# Patient Record
Sex: MALE | Race: WHITE | Employment: OTHER
[De-identification: names, ages, dates, MRNs, and addresses within clinical notes are randomized per-mention and may not be internally consistent; named-entity substitution may affect disease eponyms.]

---

## 2022-03-19 PROBLEM — K59.01 SLOW TRANSIT CONSTIPATION: Status: ACTIVE | Noted: 2019-12-09

## 2022-03-19 PROBLEM — I10 ESSENTIAL HYPERTENSION: Status: ACTIVE | Noted: 2017-05-19

## 2022-06-10 ENCOUNTER — NURSE ONLY (OUTPATIENT)
Dept: FAMILY MEDICINE CLINIC | Facility: CLINIC | Age: 67
End: 2022-06-10

## 2022-06-10 ENCOUNTER — OFFICE VISIT (OUTPATIENT)
Dept: FAMILY MEDICINE CLINIC | Facility: CLINIC | Age: 67
End: 2022-06-10
Payer: COMMERCIAL

## 2022-06-10 VITALS
SYSTOLIC BLOOD PRESSURE: 134 MMHG | RESPIRATION RATE: 17 BRPM | DIASTOLIC BLOOD PRESSURE: 82 MMHG | BODY MASS INDEX: 24.16 KG/M2 | OXYGEN SATURATION: 97 % | HEART RATE: 74 BPM | TEMPERATURE: 97.1 F | HEIGHT: 77 IN | WEIGHT: 204.6 LBS

## 2022-06-10 DIAGNOSIS — I10 ESSENTIAL HYPERTENSION: ICD-10-CM

## 2022-06-10 DIAGNOSIS — M51.26 LUMBAR DISC HERNIATION: ICD-10-CM

## 2022-06-10 DIAGNOSIS — Z12.5 SCREENING PSA (PROSTATE SPECIFIC ANTIGEN): ICD-10-CM

## 2022-06-10 DIAGNOSIS — Z12.11 COLON CANCER SCREENING: ICD-10-CM

## 2022-06-10 DIAGNOSIS — Z13.220 SCREENING FOR CHOLESTEROL LEVEL: ICD-10-CM

## 2022-06-10 DIAGNOSIS — R29.898 WEAKNESS OF BOTH LOWER EXTREMITIES: Primary | ICD-10-CM

## 2022-06-10 DIAGNOSIS — K59.01 SLOW TRANSIT CONSTIPATION: ICD-10-CM

## 2022-06-10 DIAGNOSIS — E78.2 MIXED HYPERLIPIDEMIA: ICD-10-CM

## 2022-06-10 DIAGNOSIS — K58.2 IRRITABLE BOWEL SYNDROME WITH BOTH CONSTIPATION AND DIARRHEA: ICD-10-CM

## 2022-06-10 DIAGNOSIS — L40.9 PSORIASIS: ICD-10-CM

## 2022-06-10 DIAGNOSIS — J30.2 SEASONAL ALLERGIES: ICD-10-CM

## 2022-06-10 LAB
BASOPHILS # BLD: 0 K/UL (ref 0–0.2)
BASOPHILS NFR BLD: 1 % (ref 0–2)
CHOLEST SERPL-MCNC: 221 MG/DL
DIFFERENTIAL METHOD BLD: NORMAL
EOSINOPHIL # BLD: 0.2 K/UL (ref 0–0.8)
EOSINOPHIL NFR BLD: 3 % (ref 0.5–7.8)
ERYTHROCYTE [DISTWIDTH] IN BLOOD BY AUTOMATED COUNT: 12 % (ref 11.9–14.6)
HCT VFR BLD AUTO: 48.2 % (ref 41.1–50.3)
HDLC SERPL-MCNC: 76 MG/DL (ref 40–60)
HDLC SERPL: 2.9 {RATIO}
HGB BLD-MCNC: 15.8 G/DL (ref 13.6–17.2)
IMM GRANULOCYTES # BLD AUTO: 0 K/UL (ref 0–0.5)
IMM GRANULOCYTES NFR BLD AUTO: 0 % (ref 0–5)
LDLC SERPL CALC-MCNC: 122.2 MG/DL
LYMPHOCYTES # BLD: 1.8 K/UL (ref 0.5–4.6)
LYMPHOCYTES NFR BLD: 26 % (ref 13–44)
MCH RBC QN AUTO: 30.6 PG (ref 26.1–32.9)
MCHC RBC AUTO-ENTMCNC: 32.8 G/DL (ref 31.4–35)
MCV RBC AUTO: 93.2 FL (ref 79.6–97.8)
MONOCYTES # BLD: 0.7 K/UL (ref 0.1–1.3)
MONOCYTES NFR BLD: 9 % (ref 4–12)
NEUTS SEG # BLD: 4.4 K/UL (ref 1.7–8.2)
NEUTS SEG NFR BLD: 61 % (ref 43–78)
NRBC # BLD: 0 K/UL (ref 0–0.2)
PLATELET # BLD AUTO: 186 K/UL (ref 150–450)
PMV BLD AUTO: 10.4 FL (ref 9.4–12.3)
PSA SERPL-MCNC: 0.4 NG/ML
RBC # BLD AUTO: 5.17 M/UL (ref 4.23–5.6)
TRIGL SERPL-MCNC: 114 MG/DL (ref 35–150)
VLDLC SERPL CALC-MCNC: 22.8 MG/DL (ref 6–23)
WBC # BLD AUTO: 7.2 K/UL (ref 4.3–11.1)

## 2022-06-10 PROCEDURE — 99214 OFFICE O/P EST MOD 30 MIN: CPT | Performed by: FAMILY MEDICINE

## 2022-06-10 PROCEDURE — 1123F ACP DISCUSS/DSCN MKR DOCD: CPT | Performed by: FAMILY MEDICINE

## 2022-06-10 RX ORDER — DICYCLOMINE HYDROCHLORIDE 10 MG/1
20 CAPSULE ORAL 3 TIMES DAILY PRN
Qty: 60 CAPSULE | Refills: 5 | Status: SHIPPED | OUTPATIENT
Start: 2022-06-10 | End: 2022-10-04 | Stop reason: SDUPTHER

## 2022-06-10 RX ORDER — AMLODIPINE BESYLATE 10 MG/1
10 TABLET ORAL DAILY
Qty: 90 TABLET | Refills: 3 | Status: SHIPPED | OUTPATIENT
Start: 2022-06-10

## 2022-06-10 RX ORDER — FLUTICASONE PROPIONATE 50 MCG
1 SPRAY, SUSPENSION (ML) NASAL DAILY
Qty: 16 G | Refills: 5 | Status: SHIPPED | OUTPATIENT
Start: 2022-06-10

## 2022-06-10 ASSESSMENT — ENCOUNTER SYMPTOMS
BACK PAIN: 1
ABDOMINAL PAIN: 1
COUGH: 0
CONSTIPATION: 1
NAUSEA: 1
SHORTNESS OF BREATH: 0
DIARRHEA: 1

## 2022-06-10 ASSESSMENT — ANXIETY QUESTIONNAIRES
6. BECOMING EASILY ANNOYED OR IRRITABLE: 0
1. FEELING NERVOUS, ANXIOUS, OR ON EDGE: 0
IF YOU CHECKED OFF ANY PROBLEMS ON THIS QUESTIONNAIRE, HOW DIFFICULT HAVE THESE PROBLEMS MADE IT FOR YOU TO DO YOUR WORK, TAKE CARE OF THINGS AT HOME, OR GET ALONG WITH OTHER PEOPLE: NOT DIFFICULT AT ALL
GAD7 TOTAL SCORE: 0
4. TROUBLE RELAXING: 0
7. FEELING AFRAID AS IF SOMETHING AWFUL MIGHT HAPPEN: 0
3. WORRYING TOO MUCH ABOUT DIFFERENT THINGS: 0
2. NOT BEING ABLE TO STOP OR CONTROL WORRYING: 0
5. BEING SO RESTLESS THAT IT IS HARD TO SIT STILL: 0

## 2022-06-10 ASSESSMENT — PATIENT HEALTH QUESTIONNAIRE - PHQ9
SUM OF ALL RESPONSES TO PHQ QUESTIONS 1-9: 0
2. FEELING DOWN, DEPRESSED OR HOPELESS: 0
DEPRESSION UNABLE TO ASSESS: PT REFUSES
SUM OF ALL RESPONSES TO PHQ9 QUESTIONS 1 & 2: 0
SUM OF ALL RESPONSES TO PHQ QUESTIONS 1-9: 0
SUM OF ALL RESPONSES TO PHQ QUESTIONS 1-9: 0
1. LITTLE INTEREST OR PLEASURE IN DOING THINGS: 0
SUM OF ALL RESPONSES TO PHQ QUESTIONS 1-9: 0

## 2022-06-10 NOTE — PROGRESS NOTES
6/10/2022    Francia Dos Santos (:  1955) is a 73 y.o. male, here for a preventive medicine evaluation. Patient Active Problem List   Diagnosis    Essential hypertension    Slow transit constipation     73-year-old male with underlying hypertension, irritable bowel symptoms is here for yearly checkup. Has not been participating in healthcare for the last year due to loss of insurance. He has several concerns today. 1) lower extremity weakness. He reports bilateral weakness in his legs. Often feels like he cannot get them to move. Interestingly says it is worse when he has significant constipation due to IBS. 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 Not sure what the tumor was but he reports it was benign. No history of cervical or lumbar surgery. He was in [place] again in . Evaluated for lower extremity weakness. MRI without contrast as below. \"Mri Lumbar Spine Without Contrast  Result Date: 2021  Multilevel degenerative disc disease which is most significant at L4-L5 with a posterior superior central disc herniation seen effacing the left lateral recess and possibly encroaching on the descending left L5 nerve root. No acute osseous abnormality\"    He apparently did not follow-up after this. At this time he reports unsteady gait requiring him to use a cane. 2)  Hypertension:  Controlled with amlodipine 10 mg. Denies chest pain, shortness of breath, headaches. No history of CAD. 3) IBS:  Alternates between constipation and diarrhea. Often has to take Dulcolax daily. 4) skin  Several areas of patchy, dry skin. Has not seen dermatology recently. Would like referral.          Review of Systems   Constitutional: Negative for activity change, appetite change, chills, diaphoresis, fatigue and fever. Respiratory: Negative for cough and shortness of breath. Cardiovascular: Negative. Negative for chest pain. Gastrointestinal: Positive for abdominal pain, constipation, diarrhea and nausea. Musculoskeletal: Positive for back pain and gait problem. Skin: Positive for rash. Neurological: Positive for weakness. Prior to Visit Medications    Medication Sig Taking? Authorizing Provider   amLODIPine (NORVASC) 10 MG tablet Take 10 mg by mouth daily Yes Ar Automatic Reconciliation   bisacodyl (DULCOLAX) 5 MG EC tablet Take 5 mg by mouth daily Yes Ar Automatic Reconciliation   dicyclomine (BENTYL) 10 MG capsule Take 20 mg by mouth 3 times daily as needed Yes Ar Automatic Reconciliation   fluticasone (FLONASE) 50 MCG/ACT nasal spray USE 2 SPRAYS EACH NOSTRIL A DAY AS NEEDED Yes Ar Automatic Reconciliation   polyethylene glycol (GLYCOLAX) 17 GM/SCOOP powder Take 34 g by mouth 2 times daily Yes Ar Automatic Reconciliation        No Known Allergies    Past Medical History:   Diagnosis Date    Hypertension        Past Surgical History:   Procedure Laterality Date    NEUROLOGICAL SURGERY      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      ORTHOPEDIC SURGERY      left knee    SKIN GRAFT      right hand and lower arm       Social History     Socioeconomic History    Marital status:      Spouse name: Not on file    Number of children: Not on file    Years of education: Not on file    Highest education level: Not on file   Occupational History    Not on file   Tobacco Use    Smoking status: Current Every Day Smoker     Packs/day: 0.50    Smokeless tobacco: Never Used   Substance and Sexual Activity    Alcohol use:  Yes    Drug use: No    Sexual activity: Not on file   Other Topics Concern    Not on file   Social History Narrative    Not on file     Social Determinants of Health     Financial Resource Strain:     Difficulty of Paying Living Expenses: Not on file   Food Insecurity:     Worried About Running Out of Food in the Last Year: Not on file    Fariba of Food in the Last Year: Not on file   Transportation Needs:     Lack of Transportation (Medical): Not on file    Lack of Transportation (Non-Medical): Not on file   Physical Activity:     Days of Exercise per Week: Not on file    Minutes of Exercise per Session: Not on file   Stress:     Feeling of Stress : Not on file   Social Connections:     Frequency of Communication with Friends and Family: Not on file    Frequency of Social Gatherings with Friends and Family: Not on file    Attends [employer] Services: Not on file    Active Member of [address] [place] [address] [employer] or Organizations: Not on file    Attends Club or Organization Meetings: Not on file    Marital Status: Not on file   Intimate Partner Violence:     Fear of Current or Ex-Partner: Not on file    Emotionally Abused: Not on file    Physically Abused: Not on file    Sexually Abused: Not on file   Housing Stability:     Unable to Pay for Housing in the Last Year: Not on file    Number of [place] in the Last Year: Not on file    Unstable Housing in the Last Year: Not on file        Family History   Problem Relation Age of Onset    Diabetes Sister     Stroke Father     Diabetes Brother     Cancer Neg Hx     Diabetes Mother         borderline    Heart Disease Mother         chf    Heart Disease Sister         MI  over 48       ADVANCE DIRECTIVE: N, <no information>    Vitals:    06/10/22 1024   BP: 134/82  Comment: manual   Site: Left Upper Arm   Pulse: 74   Resp: 17   Temp: 97.1 °F (36.2 °C)   TempSrc: Temporal   SpO2: 97%  Comment: room air sitting   Weight: 204 lb 9.6 oz (92.8 kg)   Height: 6' 5\" (1.956 m)     Estimated body mass index is 24.26 kg/m² as calculated from the following:    Height as of this encounter: 6' 5\" (1.956 m). Weight as of this encounter: 204 lb 9.6 oz (92.8 kg). Physical Exam  Vitals and nursing note reviewed. Constitutional:       General: He is not in acute distress. Appearance: He is not ill-appearing or toxic-appearing.    Cardiovascular:      Rate and Rhythm: Normal rate and regular rhythm. Pulmonary:      Effort: Pulmonary effort is normal.      Breath sounds: Normal breath sounds. Musculoskeletal:      Right lower leg: No edema. Left lower leg: No edema. Skin:     Capillary Refill: Capillary refill takes less than 2 seconds. Findings: Erythema and rash present. Neurological:      Mental Status: He is alert and oriented to person, place, and time. Motor: Weakness present. Coordination: Coordination abnormal.      Gait: Gait abnormal.   Psychiatric:         Mood and Affect: Mood normal.         Behavior: Behavior normal.         Thought Content: Thought content normal.         Judgment: Judgment normal.         No flowsheet data found. Immunization History   Administered Date(s) Administered    COVID-19, Pfizer Purple top, DILUTE for use, 12+ yrs, 30mcg/0.3mL dose 03/27/2021, 04/23/2021       Health Maintenance   Topic Date Due    Pneumococcal 65+ years Vaccine (1 - PCV) Never done    HIV screen  Never done    Hepatitis C screen  Never done    DTaP/Tdap/Td vaccine (1 - Tdap) Never done    Colorectal Cancer Screen  Never done    Shingles vaccine (1 of 2) Never done    Prostate Specific Antigen (PSA) Screening or Monitoring  11/20/2020    COVID-19 Vaccine (3 - Booster for Pfizer series) 09/23/2021    AAA screen  Never done    Flu vaccine (Season Ended) 09/01/2022    Depression Screen  04/12/2023    Lipids  07/04/2026    Hepatitis A vaccine  Aged Out    Hepatitis B vaccine  Aged Out    Hib vaccine  Aged Out    Meningococcal (ACWY) vaccine  Aged Out       Assessment & Plan   Mikael Raphael was seen today for annual exam.    Diagnoses and all orders for this visit:    Weakness of both lower extremities  -     [facility] [employer]    Slow transit constipation  -     [facility]    Irritable bowel syndrome with both constipation and diarrhea  -     bisacodyl (DULCOLAX) 5 MG EC tablet;  Take 1 tablet by mouth daily  -     dicyclomine (BENTYL) 10 MG capsule; Take 2 capsules by mouth 3 times daily as needed (stomach cramping)  -     [facility]    Seasonal allergies  -     fluticasone (FLONASE) 50 MCG/ACT nasal spray; 1 spray by Nasal route daily USE 2 SPRAYS EACH NOSTRIL A DAY AS NEEDED    Essential hypertension  -     amLODIPine (NORVASC) 10 MG tablet; Take 1 tablet by mouth daily  -     Comprehensive Metabolic Panel; Future  -     CBC with Auto Differential; Future    Screening PSA (prostate specific antigen)  -     PSA Screening; Future    Mixed hyperlipidemia  -     Lipid Panel; Future    Screening for cholesterol level    Colon cancer screening  -     [facility]    Lumbar disc herniation  -     [facility]    Psoriasis  -     External Referral to Dermatology    Other orders  -     Handicap Placard MIS; by Does not apply route        PLAN:  1)  We will refer to ortho surgeon for low back disc herniation which may be causing the lower extremity weakness. 2)  Refer to gastroenterology for IBS symptoms and colonoscopy. 3)  Continue blood pressure medication. 4)  Referral to dermatology. 5)  Check labs today. We will call with results. 6) Follow up in 3 months for check up.          --Alana Nicholson MD, MD no

## 2022-06-10 NOTE — PATIENT INSTRUCTIONS
PLAN:  1)  We will refer to ortho surgeon for low back disc herniation which may be causing the lower extremity weakness. 2)  Refer to gastroenterology for IBS symptoms and colonoscopy. 3)  Continue blood pressure medication. 4)  Referral to dermatology. 5)  Check labs today. We will call with results. 6) Follow up in 3 months for check up.

## 2022-06-27 ENCOUNTER — OFFICE VISIT (OUTPATIENT)
Dept: ORTHOPEDIC SURGERY | Age: 67
End: 2022-06-27
Payer: COMMERCIAL

## 2022-06-27 VITALS — HEIGHT: 77 IN | WEIGHT: 204 LBS | BODY MASS INDEX: 24.09 KG/M2

## 2022-06-27 DIAGNOSIS — M54.50 LOW BACK PAIN, UNSPECIFIED BACK PAIN LATERALITY, UNSPECIFIED CHRONICITY, UNSPECIFIED WHETHER SCIATICA PRESENT: Primary | ICD-10-CM

## 2022-06-27 DIAGNOSIS — M16.11 PRIMARY LOCALIZED OSTEOARTHROSIS OF RIGHT HIP: ICD-10-CM

## 2022-06-27 DIAGNOSIS — M54.16 LUMBAR RADICULOPATHY: ICD-10-CM

## 2022-06-27 DIAGNOSIS — M16.12 PRIMARY LOCALIZED OSTEOARTHROSIS OF LEFT HIP: ICD-10-CM

## 2022-06-27 DIAGNOSIS — M51.36 DDD (DEGENERATIVE DISC DISEASE), LUMBAR: ICD-10-CM

## 2022-06-27 DIAGNOSIS — M47.816 FACET ARTHROPATHY, LUMBAR: ICD-10-CM

## 2022-06-27 PROCEDURE — 1123F ACP DISCUSS/DSCN MKR DOCD: CPT | Performed by: PHYSICIAN ASSISTANT

## 2022-06-27 PROCEDURE — 99203 OFFICE O/P NEW LOW 30 MIN: CPT | Performed by: PHYSICIAN ASSISTANT

## 2022-06-27 NOTE — LETTER
Gordo NICHOLS  1955  MRN [id number]                                              ROOM NUMBER______      Radiographic Studies:    Cervical MRI      Thoracic MRI         Lumbar MRI          Pelvis MRI        CONTRAST    CT Myelogram: _______________   NCS/EMG ________________ ( UE  /  LE )     MRI of ___________________          Other: ____________________      Injections:    KNEE    HIP  Depomedrol _____ mg Euflexxa _____    _______________ TFESI/SNRB  _______________ SI Joint  _______________ RADHA    _______________ Facet  _______________Piriformis/ Sciatica      Medications:    Oral Steroids _______________  NSAIDS _______________    Muscle Relaxers _______________  Neurontin/Lyrica _______________    Pain Medicine _______________  Other _______________                       Physical Therapy:    Lumbar     Thoracic      Cervical     Hip       Knee       Shoulder               Traction          Ultrasound          Dry Needling      Referral:    Pain referral:  [facility]   [facility]   Other: ______________________________    Follow-up/ Refer__________________________________________________    Authorization to hold blood thinners:___________________________________

## 2022-06-27 NOTE — PATIENT INSTRUCTIONS
Patient Education        Learning About Degenerative Disc Disease  What is degenerative disc disease? Degenerative disc disease isn't really a disease. It's a term used to describe the normal changes in your spinal discs as you age. Spinal discs are small, spongy discs that separate the bones (vertebrae) that make up the spine. The discs act as shock absorbers for the spine. They let your spine flex, bend, andtwist.  Degenerative disc disease can take place in one or more places along the spine. It most often occurs in the discs in the lower back and the neck. The changes in the discs can cause back and neck pain. They can also lead toosteoarthritis, a herniated disc, or spinal stenosis. What causes it? As we age, our spinal discs break down, or degenerate. This breakdown causesthe symptoms of degenerative disc disease in some people. When the discs break down, they can lose fluid and dry out, and their outer layers can have tiny cracks or tears. This leads to less padding and less space between the bones in the spine. The body reacts to this by making bony growths on the spine called bone spurs. These spurs can press on the spinal nerve rootsor spinal cord. This can cause pain and can affect how well the nerves work. These changes in the discs are more likely to occur if you smoke, do heavy physical work (such as repeated heavy lifting), or are very overweight. Asudden injury may also cause changes to occur. What are the symptoms? Many people with degenerative disc disease have no pain. But others have severe pain or other symptoms that limit their activities. Some of the most commonsymptoms are:   Pain in the back or neck. Where the pain occurs depends on which discs are affected.  Pain that gets worse when you move, such as when you bend over, reach up, or twist.   Pain that may occur in the rear end (buttocks), arm, or leg if a nerve is pinched.  Numbness or tingling in your arm or leg.   The pain may start after a major injury (such as from a car accident), a minor injury (such as a fall from a low height), or a normal motion (such as bending over to pick something up). It may also start gradually for no known reason andget worse over time. How is it diagnosed? A doctor can often diagnose degenerative disc disease while doing a physical exam. If your exam shows no signs of a serious condition, imaging tests (suchas an X-ray) aren't likely to help your doctor find the cause of your symptoms. Sometimes degenerative disc disease is found when an X-ray is taken for another reason, such as an injury or other health problem. But even if the doctor findsdegenerative disc disease, that doesn't always mean that you will have symptoms. How is degenerative disc disease treated? Self-care may be all you need to relieve pain caused by disc changes. This may include using ice or heat and taking over-the-counter medicines. Your doctorcan prescribe stronger medicines if needed. If you develop health problems such as osteoarthritis, a herniated disc, or spinal stenosis, you may need other treatments. These include physical therapy and exercises for strengthening and stretching the back. In some cases, surgery may be recommended. It usually involves removing the damaged disc. In some cases, the bone is then permanently joined (fused) to protect the spinal cord. In rare cases, an artificial disc may be used to replace the disc that isremoved. How can you care for yourself? Here are some things you can do to help manage pain from degenerative discdisease.  Use ice or heat (whichever feels better) on the affected area. ? Put ice or a cold pack on the area for 10 to 20 minutes at a time. Put a thin cloth between the ice and your skin. ? Put a warm water bottle, a heating pad set on low, or a warm cloth on your back. Put a thin cloth between the heating pad and your skin.  Do not go to sleep with a heating pad on your skin.   Ask your doctor if you can take an over-the-counter pain medicine. These include acetaminophen (such as Tylenol) and nonsteroidal anti-inflammatory drugs, such as ibuprofen or naproxen. Your doctor can prescribe stronger medicines if needed. Be safe with medicines. Read and followall instructions on the label.  Get some exercise every day. Exercise is one of the best ways to help your back feel better and stay better. It's best to start each exercise slowly. You may notice a little soreness, and that's okay. But if an exercise makes your pain worse, stop doing it. Here arethings you can try:  ? Walking. It's the simplest and maybe the best activity for your back. It gets your blood moving and helps your muscles stay strong. ? Exercises that gently stretch and strengthen your stomach, back, and leg muscles. The stronger those muscles are, the better they're able to protect your back. Follow-up care is a key part of your treatment and safety. Be sure to make and go to all appointments, and call your doctor if you are having problems. It's also a good idea to know your test results and keep alist of the medicines you take. Where can you learn more? Go to https://[employer]tern[employer].[email or web address]. org and sign in to your damntheradio account. Enter L254 in the [facility] box to learn more about \"Learning About Degenerative Disc Disease. \"     If you do not have an account, please click on the \"Sign Up Now\" link. Current as of: March 9, 2022               Content Version: 13.3  © 2006-2022 Healthwise, Incorporated. Care instructions adapted under license by [facility] ([facility]). If you have questions about a medical condition or this instruction, always ask your healthcare professional. [address] any warranty or liability for your use of this information.

## 2022-06-27 NOTE — PROGRESS NOTES
Name: La Nena Richard  YOB: 1955  Gender: male  MRN: [id number]    CC: Back Pain (Low back pain down into right leg)       HPI: This is a 73y.o. year old male who over 1 year history of pain in his lower back. Pain is progressively worsening it is across his lower back it is worse with standing and walking. He also gets pain that radiates down the posterior aspects of both legs. He reports if he has not had a bowel movement he has feels pressure in his back and feels like he has nerve pinching and the pain is worse. It somewhat alleviates after he has a bowel movement. He also has to use a cane or walker to help with ambulation. He has fallen 3 times because his right leg will give away with him. Pain level can get up to 8 out of 10. Both legs feel weak. History was obtained by patient    This patient  has not had lumbar surgery in the past.        AMB PAIN ASSESSMENT 6/27/2022   Location of Pain Back   Location Modifiers Right   Severity of Pain 8   Quality of Pain Aching; Norma Harada; Other (Comment)   Duration of Pain Persistent   Frequency of Pain Constant   Date Pain First Started 5/19/2021   Aggravating Factors Standing;Walking; Other (Comment)   Limiting Behavior Yes   Relieving Factors Rest   Result of Injury No   Work-Related Injury No   Are there other pain locations you wish to document? No            ROS/Meds/PSH/PMH/FH/SH: I personally reviewed the patient's collected intake data.   Below are the pertinents:    No Known Allergies      Current Outpatient Medications:     amLODIPine (NORVASC) 10 MG tablet, Take 1 tablet by mouth daily, Disp: 90 tablet, Rfl: 3    fluticasone (FLONASE) 50 MCG/ACT nasal spray, 1 spray by Nasal route daily USE 2 SPRAYS EACH NOSTRIL A DAY AS NEEDED, Disp: 16 g, Rfl: 5    bisacodyl (DULCOLAX) 5 MG EC tablet, Take 1 tablet by mouth daily, Disp: 30 tablet, Rfl: 5    dicyclomine (BENTYL) 10 MG capsule, Take 2 capsules by mouth 3 times daily as needed (stomach cramping), Disp: 60 capsule, Rfl: 5    Handicap Placard MISC, by Does not apply route, Disp: 1 each, Rfl: 0    polyethylene glycol (GLYCOLAX) 17 GM/SCOOP powder, Take 34 g by mouth 2 times daily, Disp: , Rfl:     Past Surgical History:   Procedure Laterality Date    NEUROLOGICAL SURGERY      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 He has a 2.50 pack-year smoking history. He has never used smokeless tobacco.  Alcohol:   Social History     Substance and Sexual Activity   Alcohol Use Yes    Alcohol/week: 7.0 standard drinks    Types: 7 Shots of liquor per week        Physical Exam:   @VS1@   GENERAL:  Adult in no acute distress, thin Patient is appropriately conversant  MSK:  Examination of the lumbar spine reveals hypolordosis   There is moderate tenderness to palpation along the spinous processes and paraspinal musculature. The patient ambulates with a forward flexed and assisted with a cane gait. ROM of bilateral hip(s) reveals no irritability. NEURO:  Cranial nerves grossly intact. No motor deficits. Straight leg testing is positive bilateral  Sensory testing reveals intact sensation to light touch and in the distribution of the L3-S1 dermatomes bilaterally  Ankle jerk is negative for clonus    Reflexes   Right Left   Quadriceps (L4) 2 2   Achilles (S1) 2 2     Strength testing in the lower extremity reveals the following based on the 5 point grading scale:     HF (L2) H Ab (L5) KE (L3/4) ADF (L4) EHL (L5) A Ev (S1) APF (S1)   Right 5 5 5 5 5 5 5   Left 5 5 5 5 5 5 5     PSYCH:  Alert and oriented X 3. Appropriate affect. Intact judgment and insight.          Radiographic Studies:     AP, lateral and spot views of the lumbar spine:  6/27/22    AP of the lumbar spine shows hips but I feel that his symptoms at this time are coming from the lumbar spine. He has neurogenic claudication symptoms. Recommendation is MRI scan of the lumbar spine for further evaluation of anatomy and severity of disease.    - A MRI was ordered to delineate anatomy, confirm the diagnosis and assess the severity. No orders of the defined types were placed in this encounter. Orders Placed This Encounter   Procedures    XR LUMBAR SPINE (2-3 VIEWS)        4 This is a chronic illness/condition with exacerbation and progression      Return for MRI results [facility]. Germain Shipley PA-C  06/27/22      Elements of this note were created using speech recognition software. As such, errors of speech recognition may be present.

## 2022-07-12 LAB
ALBUMIN SERPL-MCNC: 3.8 G/DL (ref 3.5–5)
ALBUMIN/GLOB SERPL: 1 {RATIO} (ref 1.1–2.2)
ALP SERPL-CCNC: 57 U/L (ref 50–136)
ALT SERPL-CCNC: 24 U/L (ref 30–65)
ANION GAP SERPL CALC-SCNC: 9 MMOL/L (ref 5–15)
AST SERPL-CCNC: 19 U/L (ref 15–37)
BILIRUB SERPL-MCNC: 0.7 MG/DL
BUN SERPL-MCNC: 20 MG/DL (ref 6–20)
CALCIUM SERPL-MCNC: 9.7 MG/DL (ref 8.5–10.1)
CHLORIDE SERPL-SCNC: 105 MMOL/L (ref 97–108)
CO2 SERPL-SCNC: 26 MMOL/L (ref 21–32)
CREAT SERPL-MCNC: 1.2 MG/DL (ref 0.7–1.3)
GLOBULIN SER CALC-MCNC: 3.8 G/DL (ref 2–4)
GLUCOSE SERPL-MCNC: 101 MG/DL (ref 50–100)
POTASSIUM SERPL-SCNC: 3.9 MMOL/L (ref 3.5–5.1)
PROT SERPL-MCNC: 7.6 G/DL (ref 6.4–8.2)
SODIUM SERPL-SCNC: 140 MMOL/L (ref 136–145)

## 2022-07-14 ENCOUNTER — CLINICAL DOCUMENTATION (OUTPATIENT)
Dept: ORTHOPEDIC SURGERY | Age: 67
End: 2022-07-14

## 2022-07-14 ENCOUNTER — TELEPHONE (OUTPATIENT)
Dept: ORTHOPEDIC SURGERY | Age: 67
End: 2022-07-14
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
## 2022-07-18 ENCOUNTER — TELEPHONE (OUTPATIENT)
Dept: FAMILY MEDICINE CLINIC | Facility: CLINIC | Age: 67
End: 2022-07-18

## 2022-07-18 NOTE — TELEPHONE ENCOUNTER
----- Message from Lonnie sent at 7/15/2022 12:39 PM EDT -----  Subject: Message to Provider    QUESTIONS  Information for Provider? Patient states he was referred for a colonoscopy   by PCP. He states he did receive a call from colon doctor to schedule an   appointment but he missed the call and was not given a call back number. He is requesting PCP call back to let him know who the specialist is and a   phone number.   ---------------------------------------------------------------------------  --------------  Ingrid López YVXT  [phone]; OK to leave message on voicemail  ---------------------------------------------------------------------------  --------------  SCRIPT ANSWERS  undefined

## 2022-08-01 ENCOUNTER — OFFICE VISIT (OUTPATIENT)
Dept: ORTHOPEDIC SURGERY | Age: 67
End: 2022-08-01
Payer: COMMERCIAL

## 2022-08-01 DIAGNOSIS — M48.062 LUMBAR STENOSIS WITH NEUROGENIC CLAUDICATION: ICD-10-CM

## 2022-08-01 DIAGNOSIS — M48.061 FORAMINAL STENOSIS OF LUMBAR REGION: Primary | ICD-10-CM

## 2022-08-01 PROCEDURE — 1123F ACP DISCUSS/DSCN MKR DOCD: CPT | Performed by: PHYSICIAN ASSISTANT

## 2022-08-01 PROCEDURE — 99214 OFFICE O/P EST MOD 30 MIN: CPT | Performed by: PHYSICIAN ASSISTANT

## 2022-08-01 RX ORDER — TRAMADOL HYDROCHLORIDE 50 MG/1
50 TABLET ORAL EVERY 6 HOURS PRN
Qty: 28 TABLET | Refills: 0 | Status: SHIPPED | OUTPATIENT
Start: 2022-08-01 | End: 2022-08-08

## 2022-08-01 NOTE — PROGRESS NOTES
Name: Jace Bueno  YOB: 1955  Gender: male  MRN: [id number]    CC: Back Pain (MRI results)       HPI: This is a 73y.o. year old male who reports over 1 year history of pain in his lower back. Pain is progressively worsening it is across his lower back it is worse with standing and walking. He also gets pain that radiates down the posterior aspects of both legs. He reports if he has not had a bowel movement he has feels pressure in his back and feels like he has nerve pinching and the pain is worse. It somewhat alleviates after he has a bowel movement. He also has to use a cane or walker to help with ambulation. He has fallen 3 times because his right leg will give away with him. Pain level can get up to 8 out of 10. Both legs feel weak. History was obtained by patient    This patient  has not had lumbar surgery in the past.  We were concerned for neurogenic compression. We ordered MRI scan of the lumbar spine to further evaluate. AMB PAIN ASSESSMENT 8/1/2022   Location of Pain -   Location Modifiers -   Severity of Pain 7   Quality of Pain -   Duration of Pain -   Frequency of Pain -   Date Pain First Started -   Aggravating Factors -   Limiting Behavior -   Relieving Factors -   Result of Injury -   Work-Related Injury -   Are there other pain locations you wish to document? -            ROS/Meds/PSH/PMH/FH/SH: I personally reviewed the patient's collected intake data. Below are the pertinents:    No Known Allergies      Current Outpatient Medications:     traMADol (ULTRAM) 50 MG tablet, Take 1 tablet by mouth every 6 hours as needed for Pain for up to 7 days. Intended supply: 7 days.  Take lowest dose possible to manage pain, Disp: 28 tablet, Rfl: 0    amLODIPine (NORVASC) 10 MG tablet, Take 1 tablet by mouth daily, Disp: 90 tablet, Rfl: 3    bisacodyl (DULCOLAX) 5 MG EC tablet, Take 1 tablet by mouth daily, Disp: 30 tablet, Rfl: 5    dicyclomine (BENTYL) 10 MG capsule, Take 2 capsules by mouth 3 times daily as needed (stomach cramping), Disp: 60 capsule, Rfl: 5    fluticasone (FLONASE) 50 MCG/ACT nasal spray, 1 spray by Nasal route daily USE 2 SPRAYS EACH NOSTRIL A DAY AS NEEDED, Disp: 16 g, Rfl: 5    Handicap Placard MISC, by Does not apply route, Disp: 1 each, Rfl: 0    polyethylene glycol (GLYCOLAX) 17 GM/SCOOP powder, Take 34 g by mouth 2 times daily, Disp: , Rfl:     Past Surgical History:   Procedure Laterality Date    NEUROLOGICAL SURGERY      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 He has a 2.50 pack-year smoking history. He has never used smokeless tobacco.  Alcohol:   Social History     Substance and Sexual Activity   Alcohol Use Yes    Alcohol/week: 7.0 standard drinks    Types: 7 Shots of liquor per week        Physical Exam:   @VS1@   GENERAL:  Adult in no acute distress, thin Patient is appropriately conversant  MSK:  Examination of the lumbar spine reveals hypolordosis   There is moderate tenderness to palpation along the spinous processes and paraspinal musculature. The patient ambulates with a forward flexed and assisted with a cane gait. ROM of bilateral hip(s) reveals no irritability. NEURO:  Cranial nerves grossly intact. No motor deficits. Straight leg testing is positive bilateral  Sensory testing reveals intact sensation to light touch and in the distribution of the L3-S1 dermatomes bilaterally  Ankle jerk is negative for clonus    Reflexes   Right Left   Quadriceps (L4) 2 2   Achilles (S1) 2 2     Strength testing in the lower extremity reveals the following based on the 5 point grading scale:     HF (L2) H Ab (L5) KE (L3/4) ADF (L4) EHL (L5) A Ev (S1) APF (S1)   Right 5 5 5 4 5 5 5   Left 5 5 5 4 5 5 5     PSYCH:  Alert and oriented X 3. Appropriate affect. Intact judgment and insight. Radiographic Studies:     AP, lateral and spot views of the lumbar spine:  6/27/22    AP of the lumbar spine shows multilevel degenerative changes especially at L4-5 and L5-S1. Hips also shows DJD which is moderate. There is some mild rotation to the left. Sagittal view the lumbar spine shows advanced degenerative change L4-5 and L5-S1 with facet arthropathy. I do not appreciate an anterior listhesis but discs are certainly degenerated L4-5 and L5-S1. X-ray impression: Advanced degenerative changes lower lumbar spine with bilateral hip arthritis    MRI Result (most recent): MRI LUMBAR SPINE WO CONTRAST 07/22/2022    Narrative  History: Low back pain, unspecified; several months duration, Radiculopathy,  lumbar region; Other intervertebral disc degeneration, lumbar region;  Spondylosis without myelopathy or radiculopathy, lumbar region    Exam: MRI lumbar spine without contrast    Technique: Axial and sagittal T1 and T2-weighted sequences are available for  review. A sagittal STIR sequence is also available for review. No comparison. Findings:    Degenerative disc signal present L2-3, L4-5, and L5-S1. Mixed subarticular  reactive endplate change present at L4-5 and L5-S1. The conus is normal in  configuration and signal density throughout. Axial imaging demonstrates no  abnormal retroperitoneal lesions. L2-3: There is a disc bulge with bilateral facet arthropathy. There is a right  posterior lateral annular fissure. There is mild right neural foraminal  narrowing. L3-L4: There is bilateral facet arthropathy without central or neural foraminal  stenosis. L4-L5: There is a disc bulge with bilateral facet arthropathy and redundancy of  ligamentum flavum. There is moderate central stenosis and severe bilateral  neural foraminal narrowing. L5-S1: There is a large disc bulge with bilateral facet arthropathy.  There is  severe right and moderate left neural foraminal narrowing. There is mild central  stenosis. Impression  Impression: Advanced multilevel lumbar spondylosis, most significant at L4-5 and  L5-S1. I have independently reviewed the MRI of the lumbar spine. I have also reviewed this with Dr. Chhaya Mabry. He has severe degenerative disc most prominent at L4-5 and L5-S1 with loss of disc base height that creates narrowing of bilateral foramen at these levels. There is disc bulging and facet arthropathy, redundant ligamentum that creates moderate stenosis at L4-5 and severe bilateral foraminal narrowing, even more prominent on the right. L5-S1 severe right and moderate left foraminal narrowing. Mild central stenosis. Assessment/Plan:         Diagnosis Orders   1. Foraminal stenosis of lumbar region  traMADol (ULTRAM) 50 MG tablet      2. Lumbar stenosis with neurogenic claudication  traMADol (ULTRAM) 50 MG tablet              This patient's clinical history and physical exam is consistent with neurogenic claudication which is due to lumbar stenosis and foraminal stenosis at L4-5 and L5-S1. It is more prominent on the right and his right leg is the leg that seems to be giving away with him. He also experiences a feeling of dragging his feet. I discussed the natural history of lumbar stenosis in that it is a degenerative condition that is usually slowly progressive resulting in gradual loss of mobility. I reassured the patient that this is not a condition that typically predisposes him to an acute paraplegia; however, the more neurologic deficits acquired and the longer they go untreated, the less likely he is to have neurological improvements after an operation. He understands that conservative treatments typically include physical therapy, oral and/or epidural steroids, pain management, and simple observation.  And, that these treatments do not address the anatomical pinching of the spinal nerves, but rather help patients cope with the resulting symptoms. I also discussed potential surgical intervention if the symptoms fail to improve or there is a progressive neurologic deficit or conservative management has been exhausted. Have discussed with Dr. Clarence Max the severity of his stenosis and surgical intervention that would be required to address this. The surgery indicated would be an L4-S1 lumbar TLIF. Reviewed this with the patient and he would like to take some time to think about his options of trying a epidural steroid injection or proceeding with surgical intervention. He will give us a call back. If he does desire epidural steroid injection, I would recommend L4-5 epidural.      Discussed that he does have DJD of bilateral hips but I feel that his symptoms at this time are coming from the lumbar spine. He has neurogenic claudication symptoms.      - Patient will call for lumbar steroid injection and we will schedule a L4-5 epidural steroid injection  -Future surgery: If the patient fails the conservative options listed above, I believe they may be a candidate for a  L4-S1 TLIF           Orders Placed This Encounter   Medications    traMADol (ULTRAM) 50 MG tablet     Sig: Take 1 tablet by mouth every 6 hours as needed for Pain for up to 7 days. Intended supply: 7 days. Take lowest dose possible to manage pain     Dispense:  28 tablet     Refill:  0     Supervising physician: Dr. Rocky Kilgore          No orders of the defined types were placed in this encounter. 4 This is a chronic illness/condition with exacerbation and progression      Return for call for injection or referral to surgeon. Sourav Montes De Oca PA-C  08/01/22      Elements of this note were created using speech recognition software. As such, errors of speech recognition may be present.

## 2022-08-01 NOTE — LETTER
Steve Mari                                                     JAY NICHOLS  1955  MRN [id number]                                              ROOM NUMBER______      Radiographic Studies:    Cervical MRI      Thoracic MRI         Lumbar MRI          Pelvis MRI        CONTRAST    CT Myelogram: _______________   NCS/EMG ________________ ( UE  /  LE )     MRI of ___________________          Other: ____________________      Injections:    KNEE    HIP  Depomedrol _____ mg Euflexxa _____    _______________ TFESI/SNRB  _______________ SI Joint  _______________ RADHA    _______________ Facet  _______________Piriformis/ Sciatica      Medications:    Oral Steroids _______________  NSAIDS _______________    Muscle Relaxers _______________  Neurontin/Lyrica _______________    Pain Medicine _______________  Other _______________                       Physical Therapy:    Lumbar     Thoracic      Cervical     Hip       Knee       Shoulder               Traction          Ultrasound          Dry Needling      Referral:    Pain referral:  [facility]   [facility]   Other: ______________________________    Follow-up/ Refer__________________________________________________    Authorization to hold blood thinners:___________________________________

## 2022-08-01 NOTE — PATIENT INSTRUCTIONS
Lumbar Stenosis    What is lumbar stenosis? Stenosis is defined as the narrowing of the spinal canal. The term lumbar simply refers to the lowest 5 vertebrae in the spine. Externally this corresponds to the small of the back just above your buttocks. Each lumbar vertebra has 3 tunnels which can be affected by stenosis. The large tunnel in the middle of the vertebra is where the spinal cord and all of the spinal nerves are contained. Also, a much smaller tunnel is on each side of the vertebra. This is where the individual nerves exit the spine. Narrowing of any of these tunnels can result in pressure on the spinal cord or spinal nerves. Spinal stenosis may involve multiple levels of the spine or may be localized to a single level. What causes spinal stenosis? Typically the tunnels in vertebrae are quite spacious and much larger than the spinal cord and nerves that pass through them. However, some patients are genetically programmed to have smaller size tunnels in the spine, predisposing them to develop symptoms from spinal stenosis. There are several other potential causes of spinal stenosis. A single event, such as a disc herniation or a fracture can cause symptoms of stenosis. But more often, it is caused by arthritic changes, such as bone spurs, thickened ligaments, joint laxity, and disc bulges. This results in pinched spinal nerves which gives symptoms of buttock and leg pain and weakness. What are the symptoms of spinal stenosis? Patients will typically have low back pain along with pain in the buttocks and legs. This usually affects patients more when they are standing or walking, and their pain is often relieved with sitting or lying. Many patients report that the decrease in their ability to walk is the most bothersome part of the condition.   And, some have found that leaning forward (such as using a cart while shopping) has enabled them to go further with less pain. Are there other conditions that can cause similar symptoms? The condition which most closely mimics spinal stenosis is poor blood circulation to the legs (vascular claudication). Other conditions such as diabetic neuropathy and hip or knee arthritis also have very similar symptoms to spinal stenosis. What is the prognosis? The natural history of degenerative spinal stenosis is usually a slow progression, gradually reducing the ability to stand or walk for extended periods. What treatments are available? The use of anti-inflammatory medications may give partial relief of symptoms. Physical therapy is often prescribed initially, which may improve lumbar range of motion and strength. Chiropractic care may help ease early symptoms in some patients. A series of steroid injections into the spine may calm the inflammation of the nerves and give temporary relief of the buttock and leg symptoms. Though, these treatments may help the patient cope with the symptoms for several years, they have little effect on the natural history of the disease which is slow progression. When should I consider consulting a spine surgeon? When you are no longer able to tolerate the symptoms or it is interfering with your everyday activities despite the use of conservative treatments, you may be a good candidate for a decompression type of spine surgery. The procedure typically performed is called a laminectomy. Some patients may require a fusion in addition to the laminectomy if there is too much joint laxity from the arthritic changes in the spine. A decompression operation for spinal stenosis is about 80% effective in reducing your buttock and leg symptoms, including your ability to stand and walk. Though there may be some reduction in low back pain, a laminectomy is much less predictable for the treatment of isolated back pain without symptoms in the buttocks or legs.       [facility]    MD Kushal Mercado MD Amy Evaristo Calk, PA-C Tonnie Falco, NP    Main Office  [address] [facility],[address], [address] [facility], [facility] [address]        For Appointments at either location, please call [phone]     Lumbar Interbody Fusion    Why is this procedure done? Like other methods of lumbar fusion, the interbody fusion technique is intended to stabilize two or more segments of the spine that have too much motion. This excessive motion can cause pinching of the spinal nerves, which causes the patient to have a combination of back pain and leg pain or weakness. By stiffening the loose spinal segments, the surgery can prevent further pinching of the spinal nerves and inflammation of the arthritic joints. The interbody fusion can also keep the spinal canals that the nerves exit open and not be narrowed causing pinching of the nerves. The interbody technique is usually done in very unstable spines to supplement a lateral fusion that uses screws and rods to provide the most stable fusion possible. How is this procedure done? A lumbar interbody fusion is done through an incision in the midline of the low back. Once the back muscles are lifted away from the spine, the roof (laminae) covering the spinal cord is removed and the pressure on the spinal cord and nerves is relieved. This is called a laminectomy. Next the cushion (disc) between the vertebrae is removed and bone surfaces are roughened to provide a surface for the bone graft to heal into. Then either a titanium cage or bone graft from a cadaver is inserted into the space where the disc was removed. Often further stability is added in the form of screws and rods that are anchored in the vertebrae. And, additional bone graft is placed on the sides of the vertebrae. What should I expect after the surgery?   Immediately after the operation, your pain will fluid (1-3%). Fortunately, this does not typically change the overall outcome of the surgery, but you may be asked to remain on bed rest for 1 days after the operation to allow the leak to seal. Other less common risks include infection, bleeding, nerve injury, reaction to anesthesia, persistent back pain or hip pain, failure of the rods or screws, and very rarely, bowel or bladder incontinence or paralysis. Other complications such as heart attack, blood clots, stroke, and even death can occur with any surgery including lumbar fusion. What restrictions will I have? For the first 3 weeks (or until you are seen back in the office) you should:  Avoid heavy lifting (>15 lbs)   Avoid excessive bending or twisting at the waist. Bend at your knees. Avoid prolonged periods of sitting  (There are no limitations for sitting in a recliner)  Avoid driving    Will I need therapy? After the operation you will be seen by a physical therapist daily to help you learn to get in and out of bed, walk and climb stairs safely. You will begin physical therapy the same day or day after surgery. Fusion patients are encouraged to walk as much as possible for exercise. Some patients may not require a formal physical therapy or rehabilitation program after discharge form the hospital.      Starting a daily aerobic exercise routine (i.e. walking, swimming, or cycling) today can help minimize complications and lead to a quicker recovery and return of function after the operation. How do I care for my wound? After the operation you will have a dressing over your low back and usually a drain tube. 1-2 days after the operation the drain and dressings will be removed. Underneath, there will be small strips of tape or glue directly on the skin over your incision that should not be removed. They will either fall off in the shower or may be removed at two weeks.   Showering is allowed three days after the operation if the wound has been dry for the previous 24 hours. Avoid submersion of the wound in water for at least 3-4 weeks. Between showers keep the wound covered with clean light dressing. You surgeon may recommend you wear a brace at all times for the first 3-4 weeks except for sleeping and bathing. Make sure you wear a T-shirt under the brace to help avoid skin irritation. Call our office:  With any signs of infection, including redness and/or swelling at the incision site, or excessive drainage. [facility]    MD Alaina Patel MD Amy Shira Jes, PA-C Brain Rung, NP    Main Office  [address] [facility],[address], [address] [facility], [address]       For Appointments at either location, please call [phone]     Epidural Steroid Injection / Selective Nerve Root Block  What is it? An epidural steroid injection (RADHA) is an injection of an anti-inflammatory medication directly on the sac that covers the spinal cord and nerves. A Selective Nerve Root Block (SNRB) is an injection that is directed around a specific nerve. Your physician usually orders an injection  when you have symptoms of an irritated spinal nerve. These symptoms can include back, buttock or leg pain, weakness, or numbness. Irritated spinal nerves are often caused by disc herniations or a tight spinal canal (stenosis). The goal of the steroid injection is to reduce the inflammation around the spinal cord and nerves, which should reduce the amount of back and leg pain you are experiencing. Epidural injections are often done in series. It would not be unusual to have two or three injections in a row 10 to 14 days apart. The reason for multiple injections is that the relief from the injection may wear off over time. And sometimes it takes multiple doses of steroid injection to obtain the most anti-inflammatory effect around the nerves.      How is it performed? You will be asked to lie on your side or stomach on the x-ray table. This will allow the physician to position you in the best way possible to access your back. Next, the skin will be cleaned and numbed with a local anesthetic. An X-ray machine will then be used to guide a small gauge needle into the space over your spinal sac. A small amount of dye will then be injected to insure the needle is in the proper position. Finally, a mixture of numbing medicine and anti-inflammatory (steroid/cortisone) will be injected. How long does it take? All together it should take about 1 hour. The back and legs may feel weak or numb for a couple of hours after the injection. Plan the have someone drive you home. Are there any restrictions after the RADHA? Try to spend the remainder of the evening resting as much as possible. You may return to your normal activities the day after the procedure, including returning to work. What are the risks? The most common risk is a spinal headache. This happens when the needle passes through the spinal sac and can result in leakage of spinal fluid. This is usually treated with lying in a flat position and high fluid intake. Rarely, spinal headaches lasting more than a few days can be treated with a small procedure called a blood patch. Another risk, though very small, is the injection of the medication into one of the tiny blood vessels in the spinal canal.  This can result in serious complications such as seizures, cardiac arrest and even death. Fortunately, these complications are quite rare. Other rare complications include infection, bleeding into the spinal canal (epidural hematoma), loss of bladder control, and injury to a nerve with the needle. Who should not have an RADHA? The injection of a steroid anywhere in the body can cause a significant increase in the blood sugar level.   Diabetics are very sensitive to steroids and should have them administered with caution. Diabetic patients can have injections but need to watch their blood sugar after the injection and discuss with their Primary Care Physician a plan to manage elevations in blood sugar. Steroids also decrease the body's ability to fight infection. Thus, any person with an active infection should not take a steroid medication until the infection has been cleared completely. If you are taking an antibiotic for an active infection, please notify our office. Additionally, some patients may have anatomic abnormalities or have had previous back surgeries which may not allow the needle to pass into the spinal canal.     Medications that result in thinning of the blood such as coumadin, aspirin, Plavix, Eliquis, Xarelto and many anti-inflammatory medications need to be discontinued 5-7 days prior to the injection. Check with your doctor regarding specific drugs you are taking. Med    [facility]    MD Jocelyne Dixon Si, MD Marcelle Sotelo, CLAY Duenas NP    Main Office  [address] [facility],[address], [facility]       For Appointments at either location, please call [phone] that result in thinning of blood such as Coumadin, Aspirin, Plavix, and many anti-inflammatories, need to Pre procedure teaching sheet: Epidural spinal injection, selective nerve root block injection, sacroiliac injection and facet block injections. You have been scheduled for spinal injection. This injection is to help decrease her back and or leg pain. A local anesthetic will be used to numb the area. Then, a spinal needle will be placed in the appropriate place according to the type of injection ordered by your physician. A steroid with or without local anesthetic will be injected. A small amount of contrast dye will be used to better visualize the injection site.   You will be lying on your stomach. A series of 3 injections may be performed as these are ordered 2 to 3 weeks apart. Be aware, steroids can take 2 to 3 days to begin working, but can take 7 to 10 days for full effectiveness. Therefore, you may not have relief immediately. Please be patient and give the injection time to work. You should not resume any type of strenuous workout routine for at least 3 days following the procedure. Walking is fine. Prior to your procedure    - Please call your primary care physician if you are on blood thinners such as Coumadin, warfarin, heparin, Plavix, Lovenox, Effient, Eliquis, Xarelto, Brilinta, or aspirin or other blood thinner as these will need to be stopped for 3 to 7 days before the injections. We will need verbal approval to stop the thinners and proceed with the injection from the physician treating you with the blood thinners prior to the appointment date. If your physician tells you it is not safe to stop the blood thinner, you must call our office and discuss this with us. We may need to cancel the procedure. - Please do not take any nonsteroidal anti-inflammatory medications (NSAIDs) such as Advil, ibuprofen, Celebrex, meloxicam or Aleve for 3 days before your injection.    - We cannot give you an injection if you are presently taking an antibiotic. - Please continue your other medications as prescribed. -You must bring someone to drive you home. - You may eat prior to your procedure, but we asked that you do not eat a heavy meal within 2 to 4 hours of your procedure. You may drink liquids up to 1 to 2 hours before your appointment time. - If you are diabetic, please adjust your medications as appropriate. If steroid is used be aware that they may increase your blood sugar. Closely monitor your blood sugars after the procedure.   - If you are sick, running a fever, have a virus or are put on antibiotics during the week before your procedure, please call to acid   Robaxisal  Aleve     Ecotrin    Meloxicam    Rufen  [place]    Empirin   Menadol    Saleto  Anacin     Equagesic   Methocarbamol   Salsalate  Anaprox    Etodolac   Midol     Sine-aid  Ansaid     Excedrin   Mobic     Sine-off  Arthritis Pain formula   Feldene   Motrin     Sominex  Ascriptin    Fenoprofen   Nabumetone    [place]  Aspergum    Feverfew   Nalfon     Sulindac  Aspirin     Florinal   Naprosyn    Talwin Compound  Syed     Flurbiprofen   Naproxen    Ticlid  BC Powders    Genpril    Norgesic    iclopidine  Bufferin    Goody's   Nuprin     Tolectin  Cataflam    Haltrin    Nyquil     Tolmetin  Clinoril     IBU    Orudis     Toradol  Clopidogrel    Ibuprofen   Oruvail     Trental  Coricidin    Indocin    Oxaprozin    Triclosan  Coumadin    Indomethacin   Pamprin    Vanquish  Darvon Compound   Ketoprofen   Pepto Bismol    Vitamin - E  Daypro     Ketorolac   Percodan    Voltaren  Diflunisal Kaopectate   Lovenox   Piroxicam    Warfarin    Diclofenac Lodine       Phenaphen    Xarelto  Eliquis       Enoxaparin

## 2022-08-08 ENCOUNTER — TELEPHONE (OUTPATIENT)
Dept: ORTHOPEDIC SURGERY | Age: 67
End: 2022-08-08

## 2022-08-08 DIAGNOSIS — M48.061 FORAMINAL STENOSIS OF LUMBAR REGION: Primary | ICD-10-CM

## 2022-08-08 DIAGNOSIS — M51.36 DDD (DEGENERATIVE DISC DISEASE), LUMBAR: ICD-10-CM

## 2022-08-08 DIAGNOSIS — M47.816 FACET ARTHROPATHY, LUMBAR: ICD-10-CM

## 2022-08-08 DIAGNOSIS — M54.16 LUMBAR RADICULOPATHY: ICD-10-CM

## 2022-08-08 DIAGNOSIS — M48.062 LUMBAR STENOSIS WITH NEUROGENIC CLAUDICATION: ICD-10-CM

## 2022-08-16 ENCOUNTER — OFFICE VISIT (OUTPATIENT)
Dept: ORTHOPEDIC SURGERY | Age: 67
End: 2022-08-16
Payer: COMMERCIAL

## 2022-08-16 DIAGNOSIS — M48.062 SPINAL STENOSIS OF LUMBAR REGION WITH NEUROGENIC CLAUDICATION: Primary | ICD-10-CM

## 2022-08-16 DIAGNOSIS — M43.16 SPONDYLOLISTHESIS OF LUMBAR REGION: ICD-10-CM

## 2022-08-16 PROCEDURE — 1123F ACP DISCUSS/DSCN MKR DOCD: CPT | Performed by: ORTHOPAEDIC SURGERY

## 2022-08-16 PROCEDURE — 99214 OFFICE O/P EST MOD 30 MIN: CPT | Performed by: ORTHOPAEDIC SURGERY

## 2022-08-16 NOTE — PROGRESS NOTES
Name: Alexia Almaguer  YOB: 1955  Gender: male  MRN: [id number]    DATE: 8/16/2022    CC: Referral - General (Discuss surgery)       HPI this is a recheck on patient Alexia Almaguer has been followed by our physician assistant. He complains of low back and right greater than left leg pain which is brought on by standing walking is improved by sitting and he has a positive grocery cart sign. He has fairly classic claudication symptoms. He has had pain for over a year but is notably worse over the last 3 months and he said his leg is feeling weak and is difficult to lift it up. He is also getting more numbness in his right leg and having to use a cane. He has fallen several times. Of note is the fact that he is nondiabetic and has no heart or lung issues. He is not on blood thinners and has no known drug allergies. RADIOGRAPHS: Lumbar MRI scan from 7/22/2022 shows severe degenerative disc changes at L4-5 and L5-S1 with severe foraminal stenosis worse on the right than the left. His foraminal stenosis is worse than his central stenosis. All the remaining levels above look normal.    AP lateral lumbar x-rays from 1/27/2022 show severe degenerative disc changes L4-S1 with a grade 1 spondylolisthesis L4-5. PE: He is normal in appearance and thin and in good physical shape. He has normal motor strength except some mild weakness with knee extension on the right. Straight leg raising test and hip range of motion is normal bilaterally. His pupils are equal round reactive to light and his neck is without adenopathy or bruit. His lungs are to auscultation bilaterally and his heart is regular rate and rhythm without murmur. His abdomen soft nontender.     CURRENT MEDS:     Current Outpatient Medications:     amLODIPine (NORVASC) 10 MG tablet, Take 1 tablet by mouth daily, Disp: 90 tablet, Rfl: 3    bisacodyl (DULCOLAX) 5 MG EC tablet, Take 1 tablet by mouth daily, Disp: 30 tablet, Rfl: 5 dicyclomine (BENTYL) 10 MG capsule, Take 2 capsules by mouth 3 times daily as needed (stomach cramping), Disp: 60 capsule, Rfl: 5    fluticasone (FLONASE) 50 MCG/ACT nasal spray, 1 spray by Nasal route daily USE 2 SPRAYS EACH NOSTRIL A DAY AS NEEDED, Disp: 16 g, Rfl: 5    Handicap Placard MISC, by Does not apply route, Disp: 1 each, Rfl: 0    polyethylene glycol (GLYCOLAX) 17 GM/SCOOP powder, Take 34 g by mouth 2 times daily, Disp: , Rfl:    No Known Allergies    ASSESSMENT/PLAN: The patient wants to be fixed and we will reviewed his scans and I showed him that he is going to need a big decompression to open up the foramen. Certainly concerned about instability with the amount of resection that will be required and he has spondylolisthesis at L4-5. Normally we would expect to get some assistance by placing an interbody cage but his disc are so degenerated I cannot guarantee that we could place the interbody cages and he understands. Talked about the hospital stay and the brace that we have him wear for 6 weeks in the physical therapy in the hospital and outpatient therapy. Went through all the risk of surgery which which include death, nerve injury, bleeding, infection, dural tear, heart attack or stroke, clot leg or lung, development of new back problems and the possibility that he may not get satisfactory pain relief. He understands he wants to proceed. We will schedule him for an L4-S1 laminectomy and fusion with possible TLIF. Wu Gamez was seen today for referral - general.    Diagnoses and all orders for this visit:    Spinal stenosis of lumbar region with neurogenic claudication    Spondylolisthesis of lumbar region             No follow-up provider specified. Electronically signed by Nalini Weston MD        Elements of this note were created using speech recognition software. As such, errors of speech recognition may be present.

## 2022-08-16 NOTE — LETTER
Dilcia Hernández  1955  73 y.o.  [id number]    Diagnosis Code:                              RT                  LT                  BILAT    DDDZ   LBP   L/S NEURITIS   L SPRAIN   SPONDY   L STENOSIS   L DISC   POST PAGE   CRONIC PAIN    SCOLI   LIMB PAIN   TH PAIN   SI JOINT   C DDDZ   C STENOSIS   C DISC   BRACH NEUR   NECK PAIN    CTS   COCCYX   OSTEO   COMP FX   HIP BURS   POST FUS   POST NON   SH PAIN   ARM PAIN    OTHER____________________________________________________________      Radiographic Studies:    CERV/ THORACIC/ LUMBAR MRI       WITH/  W/O  Contrast              _____________________Discogram    CT /Myelogram of _______________                  NCS/EMG  Upper / Lower Extremity________________    MRI of _______________________                     Other______________________    Injections:     ________________________ESI/ SNRB/ FACET                     _______________________Rhizotomies     Authorization to stop blood thinners: _____________________________________________________      Medications:    __________________Sterpred DS                                    ___________________Gabapentin QD/ BID/ TID    __________________Norco/Tramadol   QD / BID / TID    ____________________NSAIDS  QD / BID / TID    __________________Flexeril QD/ BID/ TID                           ____________________Other      Visit Charges:    Recheck 2           Recheck 3               Recheck 4              Recheck 5      INJ ______________________    New PT 2            New PT 3                New PT 4               New PT 5          Pre-op / Post-op      Physical Therapy:    Lumbar  /  Cervical                     ___________________________ (Traction / Ultrasound, Dry Needling)       Referral: Maudry [place] /  [facility]   /OTHER: __________________________________________      Follow-up with SEL______________________________________________________________      Work Note: _____________________________________________________________________

## 2022-08-22 DIAGNOSIS — M48.062 SPINAL STENOSIS OF LUMBAR REGION WITH NEUROGENIC CLAUDICATION: Primary | ICD-10-CM

## 2022-08-22 DIAGNOSIS — M43.16 SPONDYLOLISTHESIS OF LUMBAR REGION: ICD-10-CM

## 2022-08-22 PROBLEM — M43.06 SPONDYLOLYSIS OF LUMBAR REGION: Status: ACTIVE | Noted: 2022-08-22

## 2022-09-12 ENCOUNTER — TELEPHONE (OUTPATIENT)
Dept: ORTHOPEDIC SURGERY | Age: 67
End: 2022-09-12

## 2022-09-12 ENCOUNTER — OFFICE VISIT (OUTPATIENT)
Dept: ORTHOPEDIC SURGERY | Age: 67
End: 2022-09-12
Payer: MEDICARE

## 2022-09-12 DIAGNOSIS — M43.16 SPONDYLOLISTHESIS OF LUMBAR REGION: Primary | ICD-10-CM

## 2022-09-12 DIAGNOSIS — M48.062 SPINAL STENOSIS OF LUMBAR REGION WITH NEUROGENIC CLAUDICATION: ICD-10-CM

## 2022-09-12 PROCEDURE — L0637 LSO SC R ANT/POS PNL PRE CST: HCPCS | Performed by: ORTHOPAEDIC SURGERY

## 2022-09-12 NOTE — PROGRESS NOTES
An EXOS  Back Brace has been prescribed /ordered to reduce pain by restricting mobility of the trunk to facilitate healing following a surgical procedure on the spine or related soft tissue. Patient is currently scheduled for Spinal Fusion Surgery and will require back support immediately following surgery. I instructed and demonstrated to the patient how to use the brace and explained how the brace would facilitate lower lumbar support. The patient was given written instructions and communicated that they understood all information provided. They are to call the office should they have any questions regarding use and proper fit Patient read and signed documenting they understand and agree to [facility] current DME return policy.

## 2022-09-13 ENCOUNTER — OFFICE VISIT (OUTPATIENT)
Dept: FAMILY MEDICINE CLINIC | Facility: CLINIC | Age: 67
End: 2022-09-13
Payer: COMMERCIAL

## 2022-09-13 VITALS
BODY MASS INDEX: 22.69 KG/M2 | RESPIRATION RATE: 16 BRPM | HEIGHT: 77 IN | OXYGEN SATURATION: 99 % | SYSTOLIC BLOOD PRESSURE: 148 MMHG | TEMPERATURE: 98 F | DIASTOLIC BLOOD PRESSURE: 86 MMHG | HEART RATE: 76 BPM | WEIGHT: 192.2 LBS

## 2022-09-13 DIAGNOSIS — E78.2 MIXED HYPERLIPIDEMIA: ICD-10-CM

## 2022-09-13 DIAGNOSIS — Z12.11 COLON CANCER SCREENING: ICD-10-CM

## 2022-09-13 DIAGNOSIS — J32.1 CHRONIC FRONTAL SINUSITIS: ICD-10-CM

## 2022-09-13 DIAGNOSIS — H61.21 IMPACTED CERUMEN OF RIGHT EAR: ICD-10-CM

## 2022-09-13 DIAGNOSIS — I10 ESSENTIAL HYPERTENSION: Primary | ICD-10-CM

## 2022-09-13 PROCEDURE — 1123F ACP DISCUSS/DSCN MKR DOCD: CPT | Performed by: FAMILY MEDICINE

## 2022-09-13 PROCEDURE — 99214 OFFICE O/P EST MOD 30 MIN: CPT | Performed by: FAMILY MEDICINE

## 2022-09-13 SDOH — ECONOMIC STABILITY: FOOD INSECURITY: WITHIN THE PAST 12 MONTHS, THE FOOD YOU BOUGHT JUST DIDN'T LAST AND YOU DIDN'T HAVE MONEY TO GET MORE.: NEVER TRUE

## 2022-09-13 SDOH — ECONOMIC STABILITY: FOOD INSECURITY: WITHIN THE PAST 12 MONTHS, YOU WORRIED THAT YOUR FOOD WOULD RUN OUT BEFORE YOU GOT MONEY TO BUY MORE.: NEVER TRUE

## 2022-09-13 ASSESSMENT — PATIENT HEALTH QUESTIONNAIRE - PHQ9
SUM OF ALL RESPONSES TO PHQ9 QUESTIONS 1 & 2: 0
1. LITTLE INTEREST OR PLEASURE IN DOING THINGS: 0
SUM OF ALL RESPONSES TO PHQ QUESTIONS 1-9: 0
SUM OF ALL RESPONSES TO PHQ QUESTIONS 1-9: 0
2. FEELING DOWN, DEPRESSED OR HOPELESS: 0
SUM OF ALL RESPONSES TO PHQ QUESTIONS 1-9: 0
SUM OF ALL RESPONSES TO PHQ QUESTIONS 1-9: 0

## 2022-09-13 ASSESSMENT — ENCOUNTER SYMPTOMS
SINUS PRESSURE: 1
GASTROINTESTINAL NEGATIVE: 1
BACK PAIN: 1

## 2022-09-13 ASSESSMENT — SOCIAL DETERMINANTS OF HEALTH (SDOH): HOW HARD IS IT FOR YOU TO PAY FOR THE VERY BASICS LIKE FOOD, HOUSING, MEDICAL CARE, AND HEATING?: NOT HARD AT ALL

## 2022-09-13 NOTE — PROGRESS NOTES
Steve Mari (:  1955) is a 73 y.o. male,Established patient, here for evaluation of the following chief complaint(s):  3 Month Follow-Up (Patient having back surgery Monday )         ASSESSMENT/PLAN:  1. Essential hypertension  -     Comprehensive Metabolic Panel; Future  -     CBC with Auto Differential; Future  2. Colon cancer screening  3. Chronic frontal sinusitis  -     [facility] - Jeison Tian MD, Otolaryngology, Lori  4. Impacted cerumen of right ear  5. Mixed hyperlipidemia  -     Lipid Panel; Future    Cerumen cleared in office. He has back surgery next week. We will still refer to ENT for chronic rhinitis/ sinusitis. Return in about 6 months (around 3/13/2023) for AWV, Labs one week before. Subjective   SUBJECTIVE/OBJECTIVE:  HPI  74 yo male with underlying: HTN, constipation, low back pain,      PRIOR NOTE:   \"1) lower extremity weakness. He reports bilateral weakness in his legs. Often feels like he cannot get them to move. Interestingly says it is worse when he has significant constipation due to IBS. 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 Not sure what the tumor was but he reports it was benign. No history of cervical or lumbar surgery. He was in [place] again in . Evaluated for lower extremity weakness. MRI without contrast as below. \"Mri Lumbar Spine Without Contrast  Result Date: 2021  Multilevel degenerative disc disease which is most significant at L4-L5 with a posterior superior central disc herniation seen effacing the left lateral recess and possibly encroaching on the descending left L5 nerve root. No acute osseous abnormality\"     He apparently did not follow-up after this. At this time he reports unsteady gait requiring him to use a cane. \"     He has surgery scheduled for next week with Dr. Jeremi Ac. 2)  Hypertension:  Controlled with amlodipine 10 mg.   Denies chest pain, shortness of breath, headaches. No history of CAD. 3) IBS:  Alternates between constipation and diarrhea. Often has to take Dulcolax daily. 4) skin  Several areas of patchy, dry skin. Has seen dermatology; waiting on biopsy. 5)  Chronic sinusitis/ muffled hearing in right ear. Found to have a large amount of cerumen in right ear. Review of Systems   Constitutional:  Negative for activity change, appetite change, fatigue and fever. HENT:  Positive for congestion, ear discharge, hearing loss and sinus pressure. Cardiovascular: Negative. Gastrointestinal: Negative. Musculoskeletal:  Positive for back pain. Neurological:  Negative for headaches. Objective   Physical Exam  Vitals and nursing note reviewed. Constitutional:       General: He is not in acute distress. Appearance: He is not ill-appearing or toxic-appearing. HENT:      Right Ear: Tympanic membrane and ear canal normal.      Left Ear: Tympanic membrane and ear canal normal.   Cardiovascular:      Rate and Rhythm: Normal rate and regular rhythm. Pulmonary:      Effort: Pulmonary effort is normal.      Breath sounds: Normal breath sounds. Neurological:      General: No focal deficit present. Mental Status: He is alert and oriented to person, place, and time. Psychiatric:         Mood and Affect: Mood normal.         Behavior: Behavior normal.         Thought Content: Thought content normal.         Judgment: Judgment normal.        Vitals:    09/13/22 1007   BP: (!) 148/86   Pulse: 76   Resp: 16   Temp: 98 °F (36.7 °C)   SpO2: 99%       On this date 9/13/2022 I have spent 30 minutes reviewing previous notes, test results and face to face with the patient discussing the diagnosis and importance of compliance with the treatment plan as well as documenting on the day of the visit. An electronic signature was used to authenticate this note.     --Edgar Ravi MD, MD

## 2022-09-14 ENCOUNTER — TELEPHONE (OUTPATIENT)
Dept: FAMILY MEDICINE CLINIC | Facility: CLINIC | Age: 67
End: 2022-09-14

## 2022-09-15 ENCOUNTER — TELEPHONE (OUTPATIENT)
Dept: ORTHOPEDIC SURGERY | Age: 67
End: 2022-09-15

## 2022-09-16 ENCOUNTER — HOSPITAL ENCOUNTER (OUTPATIENT)
Dept: PREADMISSION TESTING | Age: 67
Discharge: HOME | End: 2022-09-19
Payer: MEDICARE

## 2022-09-16 VITALS
HEIGHT: 77 IN | WEIGHT: 198.9 LBS | TEMPERATURE: 98 F | OXYGEN SATURATION: 98 % | HEART RATE: 62 BPM | DIASTOLIC BLOOD PRESSURE: 83 MMHG | BODY MASS INDEX: 23.49 KG/M2 | SYSTOLIC BLOOD PRESSURE: 140 MMHG

## 2022-09-16 LAB
ANION GAP SERPL CALC-SCNC: 6 MMOL/L (ref 4–13)
APPEARANCE UR: CLEAR
BACTERIA SPEC CULT: NORMAL
BACTERIA URNS QL MICRO: NEGATIVE /HPF
BASOPHILS # BLD: 0.1 K/UL (ref 0–0.2)
BASOPHILS NFR BLD: 1 % (ref 0–2)
BILIRUB UR QL: NEGATIVE
BUN SERPL-MCNC: 17 MG/DL (ref 8–23)
CALCIUM SERPL-MCNC: 9.1 MG/DL (ref 8.3–10.4)
CASTS URNS QL MICRO: ABNORMAL /LPF
CHLORIDE SERPL-SCNC: 105 MMOL/L (ref 101–110)
CO2 SERPL-SCNC: 27 MMOL/L (ref 21–32)
COLOR UR: ABNORMAL
CREAT SERPL-MCNC: 0.89 MG/DL (ref 0.8–1.5)
DIFFERENTIAL METHOD BLD: ABNORMAL
EKG ATRIAL RATE: 62 BPM
EKG DIAGNOSIS: NORMAL
EKG P AXIS: 53 DEGREES
EKG P-R INTERVAL: 166 MS
EKG Q-T INTERVAL: 418 MS
EKG QRS DURATION: 110 MS
EKG QTC CALCULATION (BAZETT): 424 MS
EKG R AXIS: -64 DEGREES
EKG T AXIS: 64 DEGREES
EKG VENTRICULAR RATE: 62 BPM
EOSINOPHIL # BLD: 0.2 K/UL (ref 0–0.8)
EOSINOPHIL NFR BLD: 4 % (ref 0.5–7.8)
EPI CELLS #/AREA URNS HPF: ABNORMAL /HPF
ERYTHROCYTE [DISTWIDTH] IN BLOOD BY AUTOMATED COUNT: 12.5 % (ref 11.9–14.6)
GLUCOSE SERPL-MCNC: 109 MG/DL (ref 65–100)
GLUCOSE UR STRIP.AUTO-MCNC: NEGATIVE MG/DL
HCT VFR BLD AUTO: 42 % (ref 41.1–50.3)
HGB BLD-MCNC: 14.5 G/DL (ref 13.6–17.2)
HGB UR QL STRIP: NEGATIVE
IMM GRANULOCYTES # BLD AUTO: 0 K/UL (ref 0–0.5)
IMM GRANULOCYTES NFR BLD AUTO: 0 % (ref 0–5)
KETONES UR QL STRIP.AUTO: 15 MG/DL
LEUKOCYTE ESTERASE UR QL STRIP.AUTO: NEGATIVE
LYMPHOCYTES # BLD: 1.5 K/UL (ref 0.5–4.6)
LYMPHOCYTES NFR BLD: 27 % (ref 13–44)
MCH RBC QN AUTO: 31.1 PG (ref 26.1–32.9)
MCHC RBC AUTO-ENTMCNC: 34.5 G/DL (ref 31.4–35)
MCV RBC AUTO: 90.1 FL (ref 79.6–97.8)
MONOCYTES # BLD: 0.7 K/UL (ref 0.1–1.3)
MONOCYTES NFR BLD: 13 % (ref 4–12)
NEUTS SEG # BLD: 3 K/UL (ref 1.7–8.2)
NEUTS SEG NFR BLD: 55 % (ref 43–78)
NITRITE UR QL STRIP.AUTO: NEGATIVE
NRBC # BLD: 0 K/UL (ref 0–0.2)
PH UR STRIP: 7.5 [PH] (ref 5–9)
PLATELET # BLD AUTO: 165 K/UL (ref 150–450)
PMV BLD AUTO: 9.5 FL (ref 9.4–12.3)
POTASSIUM SERPL-SCNC: 3.6 MMOL/L (ref 3.5–5.1)
PROT UR STRIP-MCNC: 100 MG/DL
RBC # BLD AUTO: 4.66 M/UL (ref 4.23–5.6)
RBC #/AREA URNS HPF: ABNORMAL /HPF
SERVICE CMNT-IMP: NORMAL
SODIUM SERPL-SCNC: 138 MMOL/L (ref 136–145)
SP GR UR REFRACTOMETRY: 1.02 (ref 1–1.02)
UROBILINOGEN UR QL STRIP.AUTO: 1 EU/DL (ref 0.2–1)
WBC # BLD AUTO: 5.5 K/UL (ref 4.3–11.1)
WBC URNS QL MICRO: ABNORMAL /HPF

## 2022-09-16 PROCEDURE — 80048 BASIC METABOLIC PNL TOTAL CA: CPT

## 2022-09-16 PROCEDURE — 85025 COMPLETE CBC W/AUTO DIFF WBC: CPT

## 2022-09-16 PROCEDURE — 81001 URINALYSIS AUTO W/SCOPE: CPT

## 2022-09-16 PROCEDURE — 80307 DRUG TEST PRSMV CHEM ANLYZR: CPT

## 2022-09-16 PROCEDURE — 87641 MR-STAPH DNA AMP PROBE: CPT

## 2022-09-16 ASSESSMENT — PAIN DESCRIPTION - LOCATION: LOCATION: BACK

## 2022-09-16 ASSESSMENT — PAIN SCALES - GENERAL: PAINLEVEL_OUTOF10: 8

## 2022-09-16 NOTE — PERIOP NOTE
Patient verified name, , and surgery as listed in [facility]. Patient provided medical/health information and PTA medications to the best of their ability. TYPE  CASE: III  Orders per surgeon: were received  Labs per surgeon: cbc, bmp, ua, mrsa urine nicotine  Labs per anesthesia protocol: T&S dos  EKG:  done today and acceptable per protocol     MRSA/MSSA swab collected; pharmacy to review and dose antibiotic as appropriate. Patient provided with and instructed on education handouts including Guide to Surgery, blood transfusions, pain management, and hand hygiene for the family and community, and [facility] brochure. Road to Recovery Spine surgery patient guide given. Instructed on incentive spirometry with return demonstration. Patient viewed spine prehab video. Hibiclens and instructions given per hospital policy. Original medication prescription bottles were visualized during patient appointment. Patient teach back successful and patient demonstrates knowledge of instruction.

## 2022-09-16 NOTE — PERIOP NOTE
Directly informed patient and or family member of pre op arrival time 36 on 9/19. All questions answered. Pre op instructions reviewed. Left contact information for any additional questions or needs.

## 2022-09-16 NOTE — PERIOP NOTE
PLEASE CONTINUE TAKING ALL PRESCRIPTION MEDICATIONS UP TO THE DAY OF SURGERY UNLESS OTHERWISE DIRECTED BELOW. DISCONTINUE all vitamins and supplements 7 days prior to surgery. DISCONTINUE Non-Steriodal Anti-Inflammatory (NSAIDS) such as Advil and Aleve 5 days prior to surgery. Home Medications to take  the day of surgery    Amlodipine (Norvasc)     Fluticasone propionate (Flonase)          Home Medications   to Hold           Comments       On the day before surgery please take Acetaminophen 1000mg in the morning and then again before bed. You may substitute for Tylenol 650 mg. Please do not bring home medications with you on the day of surgery unless otherwise directed by your nurse. If you are instructed to bring home medications, please give them to your nurse as they will be administered by the nursing staff. If you have any questions, please call [address] [place] [address] [phone] or [address] [place] [address] [phone]. A copy of this note was provided to the patient for reference. How to Use Your Incentive Spirometer       About Your Incentive Spirometer  An incentive spirometer is a device that will expand your lungs by helping you to breathe more deeply and fully. The parts of your incentive spirometer are labeled in Figure 1. Using your incentive spirometer  When you're using your incentive spirometer, make sure to breathe through your mouth. If you breathe through your nose, the incentive spirometer won't work properly. You can hold your nose if you have trouble. DO NOT BLOW INTO THE DEVICE. If you feel dizzy at any time, stop and rest. Try again at a later time. Sit upright in a chair or in bed. Hold the incentive spirometer at eye level. Put the mouthpiece in your mouth and close your lips tightly around it. Slowly breathe out (exhale) completely. Breathe in (inhale) slowly through your mouth as deeply as you can.  As you take the breath, you will see the piston rise inside the large column. While the piston rises, the indicator on the right should move upwards. It should stay in between the 2 arrows (see Figure 1). Try to get the piston as high as you can, while keeping the indicator between the arrows. If the indicator doesn't stay between the arrows, you're breathing either too fast or too slow. When you get it as high as you can, hold your breath for 10 seconds, or as long as possible. While you're holding your breath, the piston will slowly fall to the base of the spirometer. Once the piston reaches the bottom of the spirometer, breathe out slowly through your mouth. Rest for a few seconds. Repeat 10 times. Try to get the piston to the same level with each breath. After each set of 10 breaths, try to cough as coughing will help loosen or clear any mucus in your lungs. Put the marker at the level the piston reached on your incentive spirometer. This will be your goal next time. Repeat these steps every hour that you're awake.   Cover the mouthpiece of the incentive spirometer when you aren't using it

## 2022-09-17 LAB
COMMENT:: ABNORMAL
COTININE UR QL SCN: POSITIVE NG/ML

## 2022-09-18 ENCOUNTER — ANESTHESIA EVENT (OUTPATIENT)
Dept: SURGERY | Age: 67
DRG: 460 | End: 2022-09-18
Payer: MEDICARE

## 2022-09-19 ENCOUNTER — APPOINTMENT (OUTPATIENT)
Dept: GENERAL RADIOLOGY | Age: 67
DRG: 460 | End: 2022-09-19
Attending: ORTHOPAEDIC SURGERY
Payer: MEDICARE

## 2022-09-19 ENCOUNTER — ANESTHESIA (OUTPATIENT)
Dept: SURGERY | Age: 67
DRG: 460 | End: 2022-09-19
Payer: MEDICARE

## 2022-09-19 ENCOUNTER — HOSPITAL ENCOUNTER (INPATIENT)
Age: 67
LOS: 6 days | Discharge: HOME | DRG: 460 | End: 2022-09-27
Attending: ORTHOPAEDIC SURGERY | Admitting: ORTHOPAEDIC SURGERY
Payer: MEDICARE

## 2022-09-19 DIAGNOSIS — M43.06 SPONDYLOLYSIS OF LUMBAR REGION: ICD-10-CM

## 2022-09-19 DIAGNOSIS — Z98.1 S/P LAMINECTOMY WITH SPINAL FUSION: ICD-10-CM

## 2022-09-19 DIAGNOSIS — Z98.1 S/P LUMBAR FUSION: Primary | ICD-10-CM

## 2022-09-19 DIAGNOSIS — M48.062 SPINAL STENOSIS OF LUMBAR REGION WITH NEUROGENIC CLAUDICATION: ICD-10-CM

## 2022-09-19 LAB
ABO + RH BLD: NORMAL
BLOOD GROUP ANTIBODIES SERPL: NORMAL
SPECIMEN EXP DATE BLD: NORMAL

## 2022-09-19 PROCEDURE — 2500000003 HC RX 250 WO HCPCS: Performed by: ORTHOPAEDIC SURGERY

## 2022-09-19 PROCEDURE — 3700000000 HC ANESTHESIA ATTENDED CARE: Performed by: ORTHOPAEDIC SURGERY

## 2022-09-19 PROCEDURE — 2720000010 HC SURG SUPPLY STERILE: Performed by: ORTHOPAEDIC SURGERY

## 2022-09-19 PROCEDURE — 2580000003 HC RX 258

## 2022-09-19 PROCEDURE — 6370000000 HC RX 637 (ALT 250 FOR IP): Performed by: ANESTHESIOLOGY

## 2022-09-19 PROCEDURE — 6360000002 HC RX W HCPCS: Performed by: ANESTHESIOLOGY

## 2022-09-19 PROCEDURE — 7100000001 HC PACU RECOVERY - ADDTL 15 MIN: Performed by: ORTHOPAEDIC SURGERY

## 2022-09-19 PROCEDURE — 03HY32Z INSERTION OF MONITORING DEVICE INTO UPPER ARTERY, PERCUTANEOUS APPROACH: ICD-10-PCS | Performed by: ORTHOPAEDIC SURGERY

## 2022-09-19 PROCEDURE — 63047 LAM FACETEC & FORAMOT LUMBAR: CPT | Performed by: ORTHOPAEDIC SURGERY

## 2022-09-19 PROCEDURE — 22612 ARTHRD PST TQ 1NTRSPC LUMBAR: CPT | Performed by: ORTHOPAEDIC SURGERY

## 2022-09-19 PROCEDURE — A4217 STERILE WATER/SALINE, 500 ML: HCPCS | Performed by: ORTHOPAEDIC SURGERY

## 2022-09-19 PROCEDURE — 0SG0071 FUSION OF LUMBAR VERTEBRAL JOINT WITH AUTOLOGOUS TISSUE SUBSTITUTE, POSTERIOR APPROACH, POSTERIOR COLUMN, OPEN APPROACH: ICD-10-PCS | Performed by: ORTHOPAEDIC SURGERY

## 2022-09-19 PROCEDURE — 2500000003 HC RX 250 WO HCPCS

## 2022-09-19 PROCEDURE — 6360000002 HC RX W HCPCS: Performed by: ORTHOPAEDIC SURGERY

## 2022-09-19 PROCEDURE — G0378 HOSPITAL OBSERVATION PER HR: HCPCS

## 2022-09-19 PROCEDURE — 2709999900 HC NON-CHARGEABLE SUPPLY: Performed by: ORTHOPAEDIC SURGERY

## 2022-09-19 PROCEDURE — 22842 INSERT SPINE FIXATION DEVICE: CPT | Performed by: ORTHOPAEDIC SURGERY

## 2022-09-19 PROCEDURE — 3600000004 HC SURGERY LEVEL 4 BASE: Performed by: ORTHOPAEDIC SURGERY

## 2022-09-19 PROCEDURE — 6370000000 HC RX 637 (ALT 250 FOR IP): Performed by: ORTHOPAEDIC SURGERY

## 2022-09-19 PROCEDURE — 63048 LAM FACETEC &FORAMOT EA ADDL: CPT | Performed by: ORTHOPAEDIC SURGERY

## 2022-09-19 PROCEDURE — 01NR0ZZ RELEASE SACRAL NERVE, OPEN APPROACH: ICD-10-PCS | Performed by: ORTHOPAEDIC SURGERY

## 2022-09-19 PROCEDURE — 86901 BLOOD TYPING SEROLOGIC RH(D): CPT

## 2022-09-19 PROCEDURE — 72100 X-RAY EXAM L-S SPINE 2/3 VWS: CPT

## 2022-09-19 PROCEDURE — 3600000014 HC SURGERY LEVEL 4 ADDTL 15MIN: Performed by: ORTHOPAEDIC SURGERY

## 2022-09-19 PROCEDURE — 7100000000 HC PACU RECOVERY - FIRST 15 MIN: Performed by: ORTHOPAEDIC SURGERY

## 2022-09-19 PROCEDURE — 22614 ARTHRD PST TQ 1NTRSPC EA ADD: CPT | Performed by: ORTHOPAEDIC SURGERY

## 2022-09-19 PROCEDURE — 0SG3071 FUSION OF LUMBOSACRAL JOINT WITH AUTOLOGOUS TISSUE SUBSTITUTE, POSTERIOR APPROACH, POSTERIOR COLUMN, OPEN APPROACH: ICD-10-PCS | Performed by: ORTHOPAEDIC SURGERY

## 2022-09-19 PROCEDURE — 01NB0ZZ RELEASE LUMBAR NERVE, OPEN APPROACH: ICD-10-PCS | Performed by: ORTHOPAEDIC SURGERY

## 2022-09-19 PROCEDURE — 2580000003 HC RX 258: Performed by: ORTHOPAEDIC SURGERY

## 2022-09-19 PROCEDURE — 2580000003 HC RX 258: Performed by: ANESTHESIOLOGY

## 2022-09-19 PROCEDURE — 6360000002 HC RX W HCPCS

## 2022-09-19 PROCEDURE — 3700000001 HC ADD 15 MINUTES (ANESTHESIA): Performed by: ORTHOPAEDIC SURGERY

## 2022-09-19 PROCEDURE — C1713 ANCHOR/SCREW BN/BN,TIS/BN: HCPCS | Performed by: ORTHOPAEDIC SURGERY

## 2022-09-19 DEVICE — BLOCKER
Type: IMPLANTABLE DEVICE | Site: BACK | Status: FUNCTIONAL
Brand: XIA 4.5 SYSTEM -  XIA CT

## 2022-09-19 DEVICE — GRAFT BNE LG: Type: IMPLANTABLE DEVICE | Site: VERTEBRAE | Status: FUNCTIONAL

## 2022-09-19 DEVICE — GRAFT BNE FIBER 15 CC OSTEOAMP SEL: Type: IMPLANTABLE DEVICE | Site: VERTEBRAE | Status: FUNCTIONAL

## 2022-09-19 DEVICE — BIO DBM PLUS PUTTY (WITH CANCELLOUS)
Type: IMPLANTABLE DEVICE | Site: VERTEBRAE | Status: FUNCTIONAL
Brand: BIO DBM

## 2022-09-19 DEVICE — VITALLIUM PREBENT AND PRECUT ROD WITH HEX
Type: IMPLANTABLE DEVICE | Site: BACK | Status: FUNCTIONAL
Brand: XIA 4 5

## 2022-09-19 DEVICE — POLYAXIAL CORTICAL SCREW
Type: IMPLANTABLE DEVICE | Site: BACK | Status: FUNCTIONAL
Brand: XIA 4.5 SYSTEM -  XIA CT

## 2022-09-19 RX ORDER — AMLODIPINE BESYLATE 5 MG/1
10 TABLET ORAL DAILY
Status: DISCONTINUED | OUTPATIENT
Start: 2022-09-20 | End: 2022-09-27 | Stop reason: HOSPADM

## 2022-09-19 RX ORDER — HYDROCODONE BITARTRATE AND ACETAMINOPHEN 10; 325 MG/1; MG/1
1 TABLET ORAL EVERY 4 HOURS PRN
Status: DISCONTINUED | OUTPATIENT
Start: 2022-09-19 | End: 2022-09-23

## 2022-09-19 RX ORDER — SODIUM CHLORIDE, SODIUM LACTATE, POTASSIUM CHLORIDE, CALCIUM CHLORIDE 600; 310; 30; 20 MG/100ML; MG/100ML; MG/100ML; MG/100ML
INJECTION, SOLUTION INTRAVENOUS CONTINUOUS
Status: DISCONTINUED | OUTPATIENT
Start: 2022-09-19 | End: 2022-09-19 | Stop reason: HOSPADM

## 2022-09-19 RX ORDER — LIDOCAINE HYDROCHLORIDE 20 MG/ML
INJECTION, SOLUTION EPIDURAL; INFILTRATION; INTRACAUDAL; PERINEURAL PRN
Status: DISCONTINUED | OUTPATIENT
Start: 2022-09-19 | End: 2022-09-19 | Stop reason: SDUPTHER

## 2022-09-19 RX ORDER — ACETAMINOPHEN 325 MG/1
650 TABLET ORAL EVERY 6 HOURS
Status: DISCONTINUED | OUTPATIENT
Start: 2022-09-19 | End: 2022-09-27 | Stop reason: HOSPADM

## 2022-09-19 RX ORDER — MIDAZOLAM HYDROCHLORIDE 1 MG/ML
INJECTION INTRAMUSCULAR; INTRAVENOUS PRN
Status: DISCONTINUED | OUTPATIENT
Start: 2022-09-19 | End: 2022-09-19 | Stop reason: SDUPTHER

## 2022-09-19 RX ORDER — SODIUM CHLORIDE 9 MG/ML
INJECTION, SOLUTION INTRAVENOUS PRN
Status: DISCONTINUED | OUTPATIENT
Start: 2022-09-19 | End: 2022-09-19 | Stop reason: HOSPADM

## 2022-09-19 RX ORDER — DIPHENHYDRAMINE HYDROCHLORIDE 50 MG/ML
12.5 INJECTION INTRAMUSCULAR; INTRAVENOUS
Status: DISCONTINUED | OUTPATIENT
Start: 2022-09-19 | End: 2022-09-19 | Stop reason: HOSPADM

## 2022-09-19 RX ORDER — OXYCODONE HYDROCHLORIDE 5 MG/1
5 TABLET ORAL PRN
Status: DISCONTINUED | OUTPATIENT
Start: 2022-09-19 | End: 2022-09-19 | Stop reason: HOSPADM

## 2022-09-19 RX ORDER — DIPHENHYDRAMINE HYDROCHLORIDE 50 MG/ML
25 INJECTION INTRAMUSCULAR; INTRAVENOUS EVERY 6 HOURS PRN
Status: DISCONTINUED | OUTPATIENT
Start: 2022-09-19 | End: 2022-09-27 | Stop reason: HOSPADM

## 2022-09-19 RX ORDER — SODIUM CHLORIDE 0.9 % (FLUSH) 0.9 %
5-40 SYRINGE (ML) INJECTION EVERY 12 HOURS SCHEDULED
Status: CANCELLED | OUTPATIENT
Start: 2022-09-19

## 2022-09-19 RX ORDER — ONDANSETRON 2 MG/ML
4 INJECTION INTRAMUSCULAR; INTRAVENOUS
Status: DISCONTINUED | OUTPATIENT
Start: 2022-09-19 | End: 2022-09-19

## 2022-09-19 RX ORDER — PHENYLEPHRINE HYDROCHLORIDE 10 MG/ML
INJECTION INTRAVENOUS PRN
Status: DISCONTINUED | OUTPATIENT
Start: 2022-09-19 | End: 2022-09-19 | Stop reason: SDUPTHER

## 2022-09-19 RX ORDER — SODIUM CHLORIDE, SODIUM LACTATE, POTASSIUM CHLORIDE, CALCIUM CHLORIDE 600; 310; 30; 20 MG/100ML; MG/100ML; MG/100ML; MG/100ML
INJECTION, SOLUTION INTRAVENOUS CONTINUOUS PRN
Status: DISCONTINUED | OUTPATIENT
Start: 2022-09-19 | End: 2022-09-19

## 2022-09-19 RX ORDER — ROCURONIUM BROMIDE 10 MG/ML
INJECTION, SOLUTION INTRAVENOUS PRN
Status: DISCONTINUED | OUTPATIENT
Start: 2022-09-19 | End: 2022-09-19 | Stop reason: SDUPTHER

## 2022-09-19 RX ORDER — HYDROMORPHONE HYDROCHLORIDE 2 MG/ML
0.5 INJECTION, SOLUTION INTRAMUSCULAR; INTRAVENOUS; SUBCUTANEOUS
Status: DISCONTINUED | OUTPATIENT
Start: 2022-09-19 | End: 2022-09-23

## 2022-09-19 RX ORDER — FENTANYL CITRATE 50 UG/ML
INJECTION, SOLUTION INTRAMUSCULAR; INTRAVENOUS PRN
Status: DISCONTINUED | OUTPATIENT
Start: 2022-09-19 | End: 2022-09-19 | Stop reason: SDUPTHER

## 2022-09-19 RX ORDER — SODIUM CHLORIDE, SODIUM LACTATE, POTASSIUM CHLORIDE, CALCIUM CHLORIDE 600; 310; 30; 20 MG/100ML; MG/100ML; MG/100ML; MG/100ML
INJECTION, SOLUTION INTRAVENOUS CONTINUOUS
Status: DISCONTINUED | OUTPATIENT
Start: 2022-09-19 | End: 2022-09-19

## 2022-09-19 RX ORDER — PROPOFOL 10 MG/ML
INJECTION, EMULSION INTRAVENOUS PRN
Status: DISCONTINUED | OUTPATIENT
Start: 2022-09-19 | End: 2022-09-19 | Stop reason: SDUPTHER

## 2022-09-19 RX ORDER — LIDOCAINE HYDROCHLORIDE ANHYDROUS AND DEXTROSE MONOHYDRATE .4; 5 G/100ML; G/100ML
INJECTION, SOLUTION INTRAVENOUS CONTINUOUS PRN
Status: DISCONTINUED | OUTPATIENT
Start: 2022-09-19 | End: 2022-09-19

## 2022-09-19 RX ORDER — KETOROLAC TROMETHAMINE 30 MG/ML
15 INJECTION, SOLUTION INTRAMUSCULAR; INTRAVENOUS EVERY 6 HOURS
Status: DISCONTINUED | OUTPATIENT
Start: 2022-09-19 | End: 2022-09-22

## 2022-09-19 RX ORDER — HYDROMORPHONE HYDROCHLORIDE 2 MG/ML
0.5 INJECTION, SOLUTION INTRAMUSCULAR; INTRAVENOUS; SUBCUTANEOUS EVERY 5 MIN PRN
Status: DISCONTINUED | OUTPATIENT
Start: 2022-09-19 | End: 2022-09-19

## 2022-09-19 RX ORDER — ONDANSETRON 4 MG/1
4 TABLET, ORALLY DISINTEGRATING ORAL EVERY 8 HOURS PRN
Status: DISCONTINUED | OUTPATIENT
Start: 2022-09-19 | End: 2022-09-27 | Stop reason: HOSPADM

## 2022-09-19 RX ORDER — POLYETHYLENE GLYCOL 3350 17 G/17G
34 POWDER, FOR SOLUTION ORAL 2 TIMES DAILY
Status: DISCONTINUED | OUTPATIENT
Start: 2022-09-19 | End: 2022-09-27 | Stop reason: HOSPADM

## 2022-09-19 RX ORDER — KETAMINE HYDROCHLORIDE 100 MG/ML
INJECTION, SOLUTION INTRAMUSCULAR; INTRAVENOUS PRN
Status: DISCONTINUED | OUTPATIENT
Start: 2022-09-19 | End: 2022-09-19 | Stop reason: SDUPTHER

## 2022-09-19 RX ORDER — FLUTICASONE PROPIONATE 50 MCG
1 SPRAY, SUSPENSION (ML) NASAL DAILY
Status: DISCONTINUED | OUTPATIENT
Start: 2022-09-20 | End: 2022-09-27 | Stop reason: HOSPADM

## 2022-09-19 RX ORDER — CYCLOBENZAPRINE HCL 10 MG
10 TABLET ORAL 3 TIMES DAILY PRN
Status: DISCONTINUED | OUTPATIENT
Start: 2022-09-19 | End: 2022-09-27 | Stop reason: HOSPADM

## 2022-09-19 RX ORDER — SODIUM CHLORIDE 0.9 % (FLUSH) 0.9 %
5-40 SYRINGE (ML) INJECTION PRN
Status: DISCONTINUED | OUTPATIENT
Start: 2022-09-19 | End: 2022-09-19 | Stop reason: HOSPADM

## 2022-09-19 RX ORDER — DIPHENHYDRAMINE HCL 25 MG
25 CAPSULE ORAL EVERY 6 HOURS PRN
Status: DISCONTINUED | OUTPATIENT
Start: 2022-09-19 | End: 2022-09-27 | Stop reason: HOSPADM

## 2022-09-19 RX ORDER — SODIUM CHLORIDE, SODIUM LACTATE, POTASSIUM CHLORIDE, CALCIUM CHLORIDE 600; 310; 30; 20 MG/100ML; MG/100ML; MG/100ML; MG/100ML
INJECTION, SOLUTION INTRAVENOUS CONTINUOUS PRN
Status: DISCONTINUED | OUTPATIENT
Start: 2022-09-19 | End: 2022-09-19 | Stop reason: SDUPTHER

## 2022-09-19 RX ORDER — FAMOTIDINE 20 MG/1
20 TABLET, FILM COATED ORAL 2 TIMES DAILY
Status: DISCONTINUED | OUTPATIENT
Start: 2022-09-19 | End: 2022-09-27 | Stop reason: HOSPADM

## 2022-09-19 RX ORDER — VANCOMYCIN HYDROCHLORIDE 1 G/20ML
INJECTION, POWDER, LYOPHILIZED, FOR SOLUTION INTRAVENOUS PRN
Status: DISCONTINUED | OUTPATIENT
Start: 2022-09-19 | End: 2022-09-19 | Stop reason: ALTCHOICE

## 2022-09-19 RX ORDER — DEXTROSE AND SODIUM CHLORIDE 5; .45 G/100ML; G/100ML
INJECTION, SOLUTION INTRAVENOUS CONTINUOUS
Status: DISCONTINUED | OUTPATIENT
Start: 2022-09-19 | End: 2022-09-22

## 2022-09-19 RX ORDER — SODIUM CHLORIDE 0.9 % (FLUSH) 0.9 %
5-40 SYRINGE (ML) INJECTION PRN
Status: CANCELLED | OUTPATIENT
Start: 2022-09-19

## 2022-09-19 RX ORDER — SODIUM CHLORIDE 0.9 % (FLUSH) 0.9 %
5-40 SYRINGE (ML) INJECTION EVERY 12 HOURS SCHEDULED
Status: DISCONTINUED | OUTPATIENT
Start: 2022-09-19 | End: 2022-09-19 | Stop reason: HOSPADM

## 2022-09-19 RX ORDER — ACETAMINOPHEN 500 MG
1000 TABLET ORAL ONCE
Status: COMPLETED | OUTPATIENT
Start: 2022-09-19 | End: 2022-09-19

## 2022-09-19 RX ORDER — SODIUM CHLORIDE 9 MG/ML
INJECTION, SOLUTION INTRAVENOUS PRN
Status: CANCELLED | OUTPATIENT
Start: 2022-09-19

## 2022-09-19 RX ORDER — MIDAZOLAM HYDROCHLORIDE 2 MG/2ML
2 INJECTION, SOLUTION INTRAMUSCULAR; INTRAVENOUS
Status: DISCONTINUED | OUTPATIENT
Start: 2022-09-19 | End: 2022-09-19 | Stop reason: HOSPADM

## 2022-09-19 RX ORDER — ONDANSETRON 2 MG/ML
INJECTION INTRAMUSCULAR; INTRAVENOUS PRN
Status: DISCONTINUED | OUTPATIENT
Start: 2022-09-19 | End: 2022-09-19 | Stop reason: SDUPTHER

## 2022-09-19 RX ORDER — OXYCODONE HYDROCHLORIDE 5 MG/1
5 TABLET ORAL PRN
Status: DISCONTINUED | OUTPATIENT
Start: 2022-09-19 | End: 2022-09-19

## 2022-09-19 RX ORDER — DEXAMETHASONE SODIUM PHOSPHATE 10 MG/ML
INJECTION INTRAMUSCULAR; INTRAVENOUS PRN
Status: DISCONTINUED | OUTPATIENT
Start: 2022-09-19 | End: 2022-09-19 | Stop reason: SDUPTHER

## 2022-09-19 RX ORDER — LIDOCAINE HYDROCHLORIDE ANHYDROUS AND DEXTROSE MONOHYDRATE .4; 5 G/100ML; G/100ML
INJECTION, SOLUTION INTRAVENOUS CONTINUOUS PRN
Status: DISCONTINUED | OUTPATIENT
Start: 2022-09-19 | End: 2022-09-19 | Stop reason: SDUPTHER

## 2022-09-19 RX ORDER — PROCHLORPERAZINE EDISYLATE 5 MG/ML
5 INJECTION INTRAMUSCULAR; INTRAVENOUS
Status: DISCONTINUED | OUTPATIENT
Start: 2022-09-19 | End: 2022-09-19

## 2022-09-19 RX ORDER — DICYCLOMINE HYDROCHLORIDE 10 MG/1
20 CAPSULE ORAL 3 TIMES DAILY PRN
Status: DISCONTINUED | OUTPATIENT
Start: 2022-09-19 | End: 2022-09-27 | Stop reason: HOSPADM

## 2022-09-19 RX ORDER — LIDOCAINE HYDROCHLORIDE 10 MG/ML
1 INJECTION, SOLUTION INFILTRATION; PERINEURAL
Status: DISCONTINUED | OUTPATIENT
Start: 2022-09-19 | End: 2022-09-19 | Stop reason: HOSPADM

## 2022-09-19 RX ORDER — ONDANSETRON 2 MG/ML
4 INJECTION INTRAMUSCULAR; INTRAVENOUS EVERY 6 HOURS PRN
Status: DISCONTINUED | OUTPATIENT
Start: 2022-09-19 | End: 2022-09-27 | Stop reason: HOSPADM

## 2022-09-19 RX ORDER — EPHEDRINE SULFATE/0.9% NACL/PF 50 MG/5 ML
SYRINGE (ML) INTRAVENOUS PRN
Status: DISCONTINUED | OUTPATIENT
Start: 2022-09-19 | End: 2022-09-19 | Stop reason: SDUPTHER

## 2022-09-19 RX ADMIN — ROCURONIUM BROMIDE 50 MG: 50 INJECTION, SOLUTION INTRAVENOUS at 08:03

## 2022-09-19 RX ADMIN — SODIUM CHLORIDE, SODIUM LACTATE, POTASSIUM CHLORIDE, AND CALCIUM CHLORIDE: 600; 310; 30; 20 INJECTION, SOLUTION INTRAVENOUS at 10:00

## 2022-09-19 RX ADMIN — KETAMINE HYDROCHLORIDE 40 MG: 100 INJECTION INTRAMUSCULAR; INTRAVENOUS at 08:03

## 2022-09-19 RX ADMIN — Medication 3 AMPULE: at 06:55

## 2022-09-19 RX ADMIN — PHENYLEPHRINE HYDROCHLORIDE 100 MCG: 10 INJECTION INTRAVENOUS at 08:35

## 2022-09-19 RX ADMIN — PROPOFOL 200 MG: 10 INJECTION, EMULSION INTRAVENOUS at 08:03

## 2022-09-19 RX ADMIN — KETOROLAC TROMETHAMINE 15 MG: 30 INJECTION, SOLUTION INTRAMUSCULAR at 20:20

## 2022-09-19 RX ADMIN — PHENYLEPHRINE HYDROCHLORIDE 100 MCG: 10 INJECTION INTRAVENOUS at 11:30

## 2022-09-19 RX ADMIN — DEXAMETHASONE SODIUM PHOSPHATE 10 MG: 10 INJECTION INTRAMUSCULAR; INTRAVENOUS at 09:20

## 2022-09-19 RX ADMIN — HYDROMORPHONE HYDROCHLORIDE 0.5 MG: 2 INJECTION, SOLUTION INTRAMUSCULAR; INTRAVENOUS; SUBCUTANEOUS at 12:28

## 2022-09-19 RX ADMIN — HYDROMORPHONE HYDROCHLORIDE 0.5 MG: 2 INJECTION, SOLUTION INTRAMUSCULAR; INTRAVENOUS; SUBCUTANEOUS at 12:45

## 2022-09-19 RX ADMIN — FAMOTIDINE 20 MG: 20 TABLET, FILM COATED ORAL at 20:20

## 2022-09-19 RX ADMIN — LIDOCAINE HYDROCHLORIDE 100 MG: 20 INJECTION, SOLUTION EPIDURAL; INFILTRATION; INTRACAUDAL; PERINEURAL at 08:03

## 2022-09-19 RX ADMIN — FENTANYL CITRATE 100 MCG: 50 INJECTION, SOLUTION INTRAMUSCULAR; INTRAVENOUS at 08:03

## 2022-09-19 RX ADMIN — Medication 10 MG: at 09:52

## 2022-09-19 RX ADMIN — ACETAMINOPHEN 1000 MG: 500 TABLET, FILM COATED ORAL at 06:55

## 2022-09-19 RX ADMIN — ACETAMINOPHEN 650 MG: 325 TABLET ORAL at 20:20

## 2022-09-19 RX ADMIN — PHENYLEPHRINE HYDROCHLORIDE 100 MCG: 10 INJECTION INTRAVENOUS at 09:53

## 2022-09-19 RX ADMIN — MIDAZOLAM 2 MG: 1 INJECTION INTRAMUSCULAR; INTRAVENOUS at 07:56

## 2022-09-19 RX ADMIN — POLYETHYLENE GLYCOL 3350 34 G: 17 POWDER, FOR SOLUTION ORAL at 20:21

## 2022-09-19 RX ADMIN — Medication 2000 MG: at 08:51

## 2022-09-19 RX ADMIN — PROPOFOL 50 MG: 10 INJECTION, EMULSION INTRAVENOUS at 08:45

## 2022-09-19 RX ADMIN — SODIUM CHLORIDE, SODIUM LACTATE, POTASSIUM CHLORIDE, AND CALCIUM CHLORIDE: 600; 310; 30; 20 INJECTION, SOLUTION INTRAVENOUS at 07:56

## 2022-09-19 RX ADMIN — Medication 10 MG: at 08:30

## 2022-09-19 RX ADMIN — Medication 10 MG: at 08:39

## 2022-09-19 RX ADMIN — HYDROMORPHONE HYDROCHLORIDE 0.5 MG: 2 INJECTION, SOLUTION INTRAMUSCULAR; INTRAVENOUS; SUBCUTANEOUS at 13:06

## 2022-09-19 RX ADMIN — DEXTROSE AND SODIUM CHLORIDE: 5; 450 INJECTION, SOLUTION INTRAVENOUS at 20:23

## 2022-09-19 RX ADMIN — ONDANSETRON 4 MG: 2 INJECTION INTRAMUSCULAR; INTRAVENOUS at 09:20

## 2022-09-19 RX ADMIN — SODIUM CHLORIDE, SODIUM LACTATE, POTASSIUM CHLORIDE, AND CALCIUM CHLORIDE: 600; 310; 30; 20 INJECTION, SOLUTION INTRAVENOUS at 08:21

## 2022-09-19 RX ADMIN — CEFAZOLIN 2000 MG: 10 INJECTION, POWDER, FOR SOLUTION INTRAVENOUS at 20:37

## 2022-09-19 RX ADMIN — PROPOFOL 30 MG: 10 INJECTION, EMULSION INTRAVENOUS at 11:56

## 2022-09-19 RX ADMIN — LIDOCAINE HYDROCHLORIDE 1 MG/KG/HR: 4 INJECTION, SOLUTION INTRAVENOUS at 08:45

## 2022-09-19 RX ADMIN — Medication 5 MG: at 09:21

## 2022-09-19 RX ADMIN — PHENYLEPHRINE HYDROCHLORIDE 100 MCG: 10 INJECTION INTRAVENOUS at 08:27

## 2022-09-19 RX ADMIN — KETAMINE HYDROCHLORIDE 20 MG: 100 INJECTION INTRAMUSCULAR; INTRAVENOUS at 10:58

## 2022-09-19 RX ADMIN — KETAMINE HYDROCHLORIDE 20 MG: 100 INJECTION INTRAMUSCULAR; INTRAVENOUS at 09:50

## 2022-09-19 ASSESSMENT — PAIN DESCRIPTION - DESCRIPTORS
DESCRIPTORS: DISCOMFORT;SORE
DESCRIPTORS: DISCOMFORT;SORE

## 2022-09-19 ASSESSMENT — PAIN SCALES - GENERAL
PAINLEVEL_OUTOF10: 7
PAINLEVEL_OUTOF10: 8
PAINLEVEL_OUTOF10: 7

## 2022-09-19 ASSESSMENT — PAIN DESCRIPTION - LOCATION
LOCATION: LEG
LOCATION: BACK
LOCATION: BACK

## 2022-09-19 ASSESSMENT — PAIN - FUNCTIONAL ASSESSMENT
PAIN_FUNCTIONAL_ASSESSMENT: 0-10
PAIN_FUNCTIONAL_ASSESSMENT: 0-10

## 2022-09-19 ASSESSMENT — PAIN DESCRIPTION - ORIENTATION: ORIENTATION: RIGHT;LEFT

## 2022-09-19 ASSESSMENT — LIFESTYLE VARIABLES: SMOKING_STATUS: 1

## 2022-09-19 NOTE — BRIEF OP NOTE
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 Item Serial No.  Lot No. LRB No. Used Action   GRAFT BNE LG - [id number]  GRAFT BNE LG [id number] [employer]OLOG[employer]  N/A 1 Implanted   SERVICE FEE 10ML BIO DBM PTTY W CHIP - [id number]  SERVICE FEE 10ML BIO DBM PTTY W CHIP  [employer] [facility] [id number] N/A 1 Implanted   GRAFT BNE FIBER 15 CC OSTEOAMP MANOJ - [id number]  GRAFT BNE FIBER 15 CC OSTEOAMP MANOJ [id number] [employer] [facility]  N/A 1 Implanted   BLOCKER SPNL CT CLAUDIA 45 - [id number]  BLOCKER SPNL CT CLAUDIA 45  LAURA SPINE HOW- [id number] N/A 6 Implanted   SCREW SPNL L20MM OD55MM POLYAX JEAN CT CLAUDIA - [id number]  SCREW SPNL L20MM OD55MM POLYAX JEAN CT [facility] SPINE HOW- [id number] N/A 6 Implanted   JESSEE SPINE PREBENT W HEX VITALIUM [id number] Diannah Both [id number]  JESSEE SPINE PREBENT W HEX VITALIUM Lenton Donte SPINE HOW- [id number] N/A 2 Implanted         Drains:   Closed/Suction Drain Midline Back Bulb (Active)       Urinary Catheter 09/19/22 2 Way; Bean (Active)       Findings: foraminal stenosis    Electronically signed by Saturnino De Luna MD on 9/19/2022 at 11:51 AM

## 2022-09-19 NOTE — ANESTHESIA PRE PROCEDURE
Department of Anesthesiology  Preprocedure Note       Name:  Blair Matta   Age:  73 y.o.  :  1955                                          MRN:  [id number]         Date:  2022      Surgeon: Abby Choe):  Home Lyons MD    Procedure: Procedure(s):  LUMBAR 4- LUMBAR 5 LAMINECTOMY FUSION WITH  TRANSFORAMINAL LUMBAR INTERBODY FUSION POSTERIOR ONE LEVEL    Medications prior to admission:   Prior to Admission medications    Medication Sig Start Date End Date Taking?  Authorizing Provider   amLODIPine (NORVASC) 10 MG tablet Take 1 tablet by mouth daily 6/10/22   Felton Gonzalez MD   bisacodyl (DULCOLAX) 5 MG EC tablet Take 1 tablet by mouth daily 6/10/22   Felton Gonzalez MD   dicyclomine (BENTYL) 10 MG capsule Take 2 capsules by mouth 3 times daily as needed (stomach cramping)  Patient not taking: No sig reported 6/10/22   Felton Gonzalez MD   fluticasone (FLONASE) 50 MCG/ACT nasal spray 1 spray by Nasal route daily USE 2 SPRAYS EACH NOSTRIL A DAY AS NEEDED 6/10/22   Felton Gonzalez MD   Handicap Placard MISC by Does not apply route 6/10/22   Felton Gonzalez MD   polyethylene glycol [facility]) 17 GM/SCOOP powder Take 34 g by mouth 2 times daily 22   Ar Automatic Reconciliation       Current medications:    Current Facility-Administered Medications   Medication Dose Route Frequency Provider Last Rate Last Admin    lidocaine 1 % injection 1 mL  1 mL IntraDERmal Once PRN Yen Vazquez MD        acetaminophen (TYLENOL) tablet 1,000 mg  1,000 mg Oral Once Yen Vazquez MD        lactated ringers infusion   IntraVENous Continuous Yen Vazquez MD        sodium chloride flush 0.9 % injection 5-40 mL  5-40 mL IntraVENous 2 times per day Yen Vazquez MD        sodium chloride flush 0.9 % injection 5-40 mL  5-40 mL IntraVENous PRN Yen Vaqzuez MD        0.9 % sodium chloride infusion   IntraVENous PRN Yen Vazquez MD        midazolam PF (VERSED) injection 2 mg  2 mg IntraVENous Once PRN Huey Mejia MD        ceFAZolin (ANCEF) 2000 mg in sterile water 20 mL IV syringe  2,000 mg IntraVENous On Call to [address] [place] [address]. Vidal Martin MD        alcohol 62% (NOZIN) nasal  3 ampule  3 ampule Topical Once S. Magui Burrell III, MD           Allergies:  No Known Allergies    Problem List:    Patient Active Problem List   Diagnosis Code    Essential hypertension I10    Slow transit constipation K59.01    Spinal stenosis of lumbar region with neurogenic claudication M48.062    Spondylolysis of lumbar region M43.06    Impacted cerumen of right ear H61.21    Chronic frontal sinusitis J32.1    Colon cancer screening Z12.11       Past Medical History:        Diagnosis Date    Cerebral artery occlusion with cerebral infarction (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      ORTHOPEDIC SURGERY      left knee    SKIN GRAFT      right hand and lower arm       Social History:    Social History     Tobacco Use    Smoking status: Every Day     Packs/day: 0.25     Years: 10.00     Pack years: 2.50     Types: Cigarettes    Smokeless tobacco: Never   Substance Use Topics    Alcohol use:  Yes     Alcohol/week: 7.0 standard drinks     Types: 7 Shots of liquor per week                                Ready to quit: Not Answered  Counseling given: Not Answered      Vital Signs (Current):   Vitals:    09/19/22 0622   BP: (!) 177/86   Pulse: 71   Resp: 16   Temp: 97.5 °F (36.4 °C)   TempSrc: Oral   SpO2: 98%   Weight: 203 lb 3.2 oz (92.2 kg)   Height: 6' 5\" (1.956 m)                                              BP Readings from Last 3 Encounters:   09/19/22 (!) 177/86   09/16/22 (!) 140/83   09/13/22 (!) 148/86       NPO Status: BMI:   Wt Readings from Last 3 Encounters:   09/19/22 203 lb 3.2 oz (92.2 kg)   09/16/22 198 lb 14.4 oz (90.2 kg)   09/13/22 192 lb 3.2 oz (87.2 kg)     Body mass index is 24.1 kg/m². CBC:   Lab Results   Component Value Date/Time    WBC 5.5 09/16/2022 10:46 AM    RBC 4.66 09/16/2022 10:46 AM    HGB 14.5 09/16/2022 10:46 AM    HCT 42.0 09/16/2022 10:46 AM    MCV 90.1 09/16/2022 10:46 AM    RDW 12.5 09/16/2022 10:46 AM     09/16/2022 10:46 AM       CMP:   Lab Results   Component Value Date/Time     09/16/2022 10:46 AM    K 3.6 09/16/2022 10:46 AM     09/16/2022 10:46 AM    CO2 27 09/16/2022 10:46 AM    BUN 17 09/16/2022 10:46 AM    CREATININE 0.89 09/16/2022 10:46 AM    GFRAA >60 09/16/2022 10:46 AM    AGRATIO 1.7 11/20/2019 09:07 AM    LABGLOM >60 09/16/2022 10:46 AM    GLUCOSE 109 09/16/2022 10:46 AM    PROT 7.6 06/10/2022 11:19 AM    CALCIUM 9.1 09/16/2022 10:46 AM    BILITOT 0.7 06/10/2022 11:19 AM    ALKPHOS 57 06/10/2022 11:19 AM    ALKPHOS 51 11/20/2019 09:07 AM    AST 19 06/10/2022 11:19 AM    ALT 24 06/10/2022 11:19 AM       POC Tests: No results for input(s): POCGLU, POCNA, POCK, POCCL, POCBUN, POCHEMO, POCHCT in the last 72 hours.     Coags: No results found for: PROTIME, INR, APTT    HCG (If Applicable): No results found for: PREGTESTUR, PREGSERUM, HCG, HCGQUANT     ABGs: No results found for: PHART, PO2ART, MKN5LFH, TLO2OWF, BEART, H1GDGALV     Type & Screen (If Applicable):  No results found for: LABABO, LABRH    Drug/Infectious Status (If Applicable):  No results found for: HIV, HEPCAB    COVID-19 Screening (If Applicable): No results found for: COVID19        Anesthesia Evaluation  Patient summary reviewed and Nursing notes reviewed no history of anesthetic complications:   Airway: Mallampati: II  TM distance: >3 FB   Neck ROM: full  Mouth opening: > = 3 FB   Dental: normal exam         Pulmonary:normal exam    (+) current smoker          Patient did not smoke on day of surgery. Cardiovascular:  Exercise tolerance: good (>4 METS),   (+) hypertension: moderate,         Rhythm: regular  Rate: normal                    Neuro/Psych:      (-) CVA (Patient denies CVA)           GI/Hepatic/Renal: Neg GI/Hepatic/Renal ROS            Endo/Other: Negative Endo/Other ROS                    Abdominal:             Vascular: negative vascular ROS. Other Findings:           Anesthesia Plan      general     ASA 2       Induction: intravenous. arterial line    Anesthetic plan and risks discussed with patient. Use of blood products discussed with patient whom consented to blood products.                      Mei Kang MD   9/19/2022

## 2022-09-19 NOTE — ANESTHESIA PROCEDURE NOTES
Airway  Date/Time: 9/19/2022 8:08 AM  Urgency: elective    Airway not difficult    General Information and Staff    Patient location during procedure: OR  Resident/CRNA: Peri Boast, APRN - CRNA  Performed: resident/CRNA     Indications and Patient Condition  Indications for airway management: anesthesia  Spontaneous Ventilation: absent  Sedation level: deep  Preoxygenated: yes  Patient position: sniffing  MILS maintained throughout  Mask difficulty assessment: vent by bag mask    Final Airway Details  Final airway type: endotracheal airway      Successful airway: ETT     Successful intubation technique: video laryngoscopy  Endotracheal tube insertion site: oral  Blade size: #4  ETT size (mm): 7.0  Cormack-Lehane Classification: grade III - view of epiglottis only (Easyy Glidescope only view of epiglottis via DLL)  Placement verified by: chest auscultation and capnometry   Measured from: lips  ETT to lips (cm): 23  Number of attempts at approach: 2  Ventilation between attempts: bag mask  Number of other approaches attempted: 1    Other Attempts  Unsuccessful attempted endotracheal techniques: direct laryngoscopy      Non-anticipated difficult airway: yes

## 2022-09-19 NOTE — PROGRESS NOTES
Hi Villavicencio MD   Physician   Specialty:  Orthopedic Surgery   Progress Notes       Signed   Encounter Date:  9/19/2022                 Signed                                                                                                                                                                          Name: Diamond Castellano  YOB: 1955  Gender: male  MRN: [id number]     DATE:09/19/2022     CC: Referral - General (Discuss surgery)        HPI this is a recheck on patient Diamond Castellano has been followed by our physician assistant. He complains of low back and right greater than left leg pain which is brought on by standing walking is improved by sitting and he has a positive grocery cart sign. He has fairly classic claudication symptoms. He has had pain for over a year but is notably worse over the last 3 months and he said his leg is feeling weak and is difficult to lift it up. He is also getting more numbness in his right leg and having to use a cane. He has fallen several times. Of note is the fact that he is nondiabetic and has no heart or lung issues. He is not on blood thinners and has no known drug allergies. RADIOGRAPHS: Lumbar MRI scan from 7/22/2022 shows severe degenerative disc changes at L4-5 and L5-S1 with severe foraminal stenosis worse on the right than the left. His foraminal stenosis is worse than his central stenosis. All the remaining levels above look normal.     AP lateral lumbar x-rays from 1/27/2022 show severe degenerative disc changes L4-S1 with a grade 1 spondylolisthesis L4-5. PE: He is normal in appearance and thin and in good physical shape. He has normal motor strength except some mild weakness with knee extension on the right. Straight leg raising test and hip range of motion is normal bilaterally. His pupils are equal round reactive to light and his neck is without adenopathy or bruit.   His lungs are to auscultation bilaterally and his heart is regular rate and rhythm without murmur. His abdomen soft nontender. CURRENT MEDS:      Current Outpatient Medications:     amLODIPine (NORVASC) 10 MG tablet, Take 1 tablet by mouth daily, Disp: 90 tablet, Rfl: 3    bisacodyl (DULCOLAX) 5 MG EC tablet, Take 1 tablet by mouth daily, Disp: 30 tablet, Rfl: 5    dicyclomine (BENTYL) 10 MG capsule, Take 2 capsules by mouth 3 times daily as needed (stomach cramping), Disp: 60 capsule, Rfl: 5    fluticasone (FLONASE) 50 MCG/ACT nasal spray, 1 spray by Nasal route daily USE 2 SPRAYS EACH NOSTRIL A DAY AS NEEDED, Disp: 16 g, Rfl: 5    Handicap Placard MISC, by Does not apply route, Disp: 1 each, Rfl: 0    polyethylene glycol (GLYCOLAX) 17 GM/SCOOP powder, Take 34 g by mouth 2 times daily, Disp: , Rfl:    No Known Allergies     ASSESSMENT/PLAN: The patient wants to be fixed and we will reviewed his scans and I showed him that he is going to need a big decompression to open up the foramen. Certainly concerned about instability with the amount of resection that will be required and he has spondylolisthesis at L4-5. Normally we would expect to get some assistance by placing an interbody cage but his disc are so degenerated I cannot guarantee that we could place the interbody cages and he understands. Talked about the hospital stay and the brace that we have him wear for 6 weeks in the physical therapy in the hospital and outpatient therapy. Went through all the risk of surgery which which include death, nerve injury, bleeding, infection, dural tear, heart attack or stroke, clot leg or lung, development of new back problems and the possibility that he may not get satisfactory pain relief. He understands he wants to proceed. We will schedule him for an L4-S1 laminectomy and fusion with possible TLIF.      Daniela Guillen was seen today for referral - general.     Diagnoses and all orders for this visit:     Spinal stenosis of lumbar region with neurogenic claudication     Spondylolisthesis of lumbar region                 No follow-up provider specified. Electronically signed by Christophe Hargrove MD           Elements of this note were created using speech recognition software. As such, errors of speech recognition may be present.                  Office Visit on 8/16/2022    Office Visit on 8/16/2022      Detailed Report    Note shared with patient  Additional Documentation    Encounter Info:  Billing Info,  History,  Allergies,  Detailed Report       Progress Notes Info    Author Note Status Last Update User Last Update Date/Time   GILLIAN Jones MD Signed Lorna Capone III, MD 9/19/2022  @ 2836

## 2022-09-19 NOTE — PERIOP NOTE
TRANSFER - OUT REPORT:    Verbal report given to [facility] RN on Alejandra Morgan  being transferred to [facility] [id number] for routine post-op       Report consisted of patients Situation, Background, Assessment and   Recommendations(SBAR). Information from the following report(s) Nurse Handoff Report, Adult Overview, Surgery Report, Intake/Output, and MAR was reviewed with the receiving nurse. Lines:   Peripheral IV 09/19/22 Left Antecubital (Active)   Site Assessment Clean, dry & intact 09/19/22 1300   Line Status Infusing 09/19/22 1300   Line Care Connections checked and tightened 09/19/22 1300   Phlebitis Assessment No symptoms 09/19/22 1300   Infiltration Assessment 0 09/19/22 1300   Alcohol Cap Used No 09/19/22 0651   Dressing Status Clean, dry & intact 09/19/22 1300   Dressing Type Transparent 09/19/22 1300        Opportunity for questions and clarification was provided. Patient transported with:   Tech    VTE prophylaxis orders have been written for Alejandra Morgan.    Patient and family given floor number and nurses name. Family updated re: pt status after security code verified.

## 2022-09-19 NOTE — OP NOTE
[facility]  OPERATIVE REPORT    Name:  Darshana Sims  MR#:  [id number]  :  1955  ACCOUNT #:  [de-identified]  DATE OF SERVICE:  2022    PREOPERATIVE DIAGNOSES:  L4-L5 and L5-S1 with spondylolisthesis and spinal stenosis and severe degenerative disk disease. POSTOPERATIVE DIAGNOSES:  L4-L5 and L5-S1 with spondylolisthesis and spinal stenosis and severe degenerative disk disease with notable foraminal stenosis bilaterally from L4 through S1.    PROCEDURE PERFORMED:  1.  Bilateral L4, L5, and S1 laminectomy with partial facetectomy and extensive foraminotomy bilaterally from L4 through S1, CPT code 98383, 93597 x2.  2.  Posterolateral spinal fusion of L4-L5 and L5-S1, CPT code [address] [address]. 3.  Placement of segmental spinal instrumentation from L4 to S1 using the Union cortical screw and edith system, CPT code 47306. SURGEON:  Vivia Paget, MD    ASSISTANT:  Staff. ANESTHESIA:  GETA. COMPLICATIONS:  None. SPECIMENS REMOVED:  None. IMPLANTS:  All Union. ESTIMATED BLOOD LOSS:  400 mL. FLUIDS:  1500 mL crystalloid. DRAINS:  One Hemovac. INDICATIONS:  The patient is a 29-year-old gentleman who has been developing progressively worsening low back and leg pain with numbness and tingling and limited ability to walk. He reports weakness in the right leg. He was found to have significant degeneration from the L4 through S1 level with a rather severe foraminal stenosis as well. He failed to get long-term improvement with epidural steroid injections, medications, and physical therapy, so he is here for surgical treatment. PROCEDURE:  The patient was brought to the operating room and after administration of anesthesia, IV antibiotics and placement of monitoring lines, he was positioned prone on the Chadwick frame. His back was then prepped with Betadine and sterilely draped.   Surgeon then called a time-out and confirmed the procedure to be performed, his allergy history and the fact that he had received preoperative IV antibiotics. C-arm was brought in and we got some AP view pictures and marked the L4-L5 and L5-S1 level on the skin. I made a midline incision over this with a scalpel and dissected down through the subcutaneous tissue with electrocautery to the deep fascia. Deep fascia was incised on either side of the spinous processes. We dissected down along the lamina out the facet joint. We placed an angled curette in interlaminar space and we ended up taking several pictures. Because of the severe degeneration, we really could not get the curette truly into the epidural space and eventually we placed a curette on top of the L5 transverse process and pedicle area and confirmed our location. We then retracted the soft tissues and exposed the lateral gutter from the L4 transverse process down to L5 as well as the sacral area as per the sacral ala and cleared the soft tissue off this bilaterally. We ended up burring down the facet joint and L5-S1 and L4-L5 in this process as well. We did use the bone graft suction device to suck up any burred down bone that we shaved and reused in our fusion. We then removed the spinous processes of L4, L5, and S1 with a rongeur and saved this for local bone graft. We then used a high-speed bur to bur down the lamina of L4, L5, and S1 bilaterally until it was very thinned out and then we used a Kerrison to complete the laminectomies. We undercut facet joints and performed extensive foraminotomies. There was a pars lysis at L5-S1. We ended up taking off the lysed part of the facet joint bilaterally. The foramen were very tight and we had been careful with the Kerrison to remove all the ligamentum flavum and soft tissue and performed extensive foraminotomies and in the process we identified the nerve roots and made sure they were free.   After we completed this, the thecal sac was free and all of the L4, L5, and S1 nerve roots bilaterally were free. Testing with the extra large dental instrument. We elected not to do any interbody device placements as we felt the disc were extremely collapsed and this would be difficult. However, by the end of the case and after the decompression of the spine it settled into a near normal appearance with much more obvious disc spaces; although, they were still very degenerated and narrow. In addition, mild scoliosis he had corrected as well. We placed segmental cortical screw instrumentation from L4 to S1 using the [place] cortical implants. We identified the pedicle on palpation and made a starting hole and then we drilled the hole and tested with nerve stimulation and there was no stimulation below 15 milliamps. Once we drilled it, we used a 5.5 diameter tap down to 35 mm in depth at all six locations and then retested and no stimulation below 12 milliamps. We smoothed off the bony surfaces with a high-speed bur and inserted the 5.5 diameter 35 mm length cortical screws from L4 through S1 bilaterally and tested the screws and there was no stimulation below 15 milliamps. We then irrigated the wound and suctioned it dry and we took AP and lateral C-arm pictures that showed excellent placement of the cortical screws at each level bilaterally and essentially gravity had pulled the spine back into its normal alignment and we could visualize small but present disc space at every level. We had also notably improved his lordosis. Once that was done, we decorticated the lateral gutters with a high-speed bur and placed our bone graft out into the lateral gutters from L4 transverse process down the sacral ala bilaterally. We then placed rods into the  screw heads and placed the locking nuts and then we used a torque wrench to torque tight the locking nuts.   At this point, we had essentially completed the surgery so we suctioned out the canal.  We placed FloSeal over that for hemostasis and also to prevent any bone graft from falling into the canal.  We placed some demineralized bone matrix putty on top of the bone graft bilaterally to keep it in place. We placed a Hemovac drain deep in the wound and brought out through a separate proximal stab incision. We then closed the deep fascia with interrupted figure-of-eight stitches of #1 Vicryl. We placed some vancomycin powder on top of the deep fascia and closed the subcutaneous tissue over it with interrupted stitches of 2-0 Vicryl. Skin was closed with a running subcuticular stitch of 3-0 Vicryl. Dermabond Prineo was applied to the incision followed by dry sterile dressing. The patient was then rolled supine onto the recovery room bed having tolerated the procedure well.       Katherine De La Rosa MD      [id number]  D:  09/19/2022 12:24  T:  09/19/2022 14:26  JOB #:  [id number]

## 2022-09-19 NOTE — ANESTHESIA PROCEDURE NOTES
Arterial Line:    An arterial line was placed using surface landmarks, in the OR for the following indication(s): continuous blood pressure monitoring and blood sampling needed. A 20 gauge (size) (length), Arrow (type) catheter was placed, Seldinger technique used, into the right radial artery, secured by Tegaderm and tape. Anesthesia type: General    Events:  patient tolerated procedure well with no complications. Additional notes:  Placed on first stick, zeroed and blood return noted. Site clean, dry and intact. Secured well.  9/19/2022 8:24 AM9/19/2022 8:26 AM  Resident/CRNA: SHIRLEY Thurman - CRNA  Performed: Resident/CRNA   Preanesthetic Checklist  Completed: patient identified, IV checked, site marked, risks and benefits discussed, surgical/procedural consents, equipment checked, pre-op evaluation, timeout performed, anesthesia consent given, oxygen available and monitors applied/VS acknowledged

## 2022-09-19 NOTE — ANESTHESIA POSTPROCEDURE EVALUATION
Department of Anesthesiology  Postprocedure Note    Patient: An Schwarz  MRN: [id number]  YOB: 1955  Date of evaluation: 9/19/2022      Procedure Summary     Date: 09/19/22 Room / Location: [facility] MAIN OR  / [facility] MAIN OR    Anesthesia Start: 6219 Anesthesia Stop: 2492    Procedure: LUMBAR 4- SACRAL 1  LAMINECTOMY FUSION WITH  TRANSFORAMINAL LUMBAR INTERBODY FUSION POSTERIOR ONE LEVEL (Back) Diagnosis:       Spinal stenosis of lumbar region with neurogenic claudication      Spondylolysis of lumbar region      (Spinal stenosis of lumbar region with neurogenic claudication [M48.062])      (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     Vijay Phase I: Vijay Score: 9    Vijay Phase II:        Anesthesia Post Evaluation    Patient location during evaluation: PACU  Patient participation: complete - patient participated  Level of consciousness: awake and alert  Airway patency: patent  Nausea & Vomiting: no nausea and no vomiting  Complications: no  Cardiovascular status: hemodynamically stable  Respiratory status: acceptable, nonlabored ventilation and spontaneous ventilation  Hydration status: euvolemic  Comments: BP (!) 155/87   Pulse (!) 105   Temp 98 °F (36.7 °C) (Temporal)   Resp 16   Ht 6' 5\" (1.956 m)   Wt 203 lb 3.2 oz (92.2 kg)   SpO2 97%   BMI 24.10 kg/m²     Multimodal analgesia pain management approach

## 2022-09-20 LAB
HCT VFR BLD AUTO: 37.1 % (ref 41.1–50.3)
HGB BLD-MCNC: 12.4 G/DL (ref 13.6–17.2)

## 2022-09-20 PROCEDURE — G0378 HOSPITAL OBSERVATION PER HR: HCPCS

## 2022-09-20 PROCEDURE — A4216 STERILE WATER/SALINE, 10 ML: HCPCS | Performed by: ORTHOPAEDIC SURGERY

## 2022-09-20 PROCEDURE — 6360000002 HC RX W HCPCS: Performed by: ORTHOPAEDIC SURGERY

## 2022-09-20 PROCEDURE — 2580000003 HC RX 258: Performed by: ORTHOPAEDIC SURGERY

## 2022-09-20 PROCEDURE — 85014 HEMATOCRIT: CPT

## 2022-09-20 PROCEDURE — 2500000003 HC RX 250 WO HCPCS: Performed by: ORTHOPAEDIC SURGERY

## 2022-09-20 PROCEDURE — 36415 COLL VENOUS BLD VENIPUNCTURE: CPT

## 2022-09-20 PROCEDURE — 97530 THERAPEUTIC ACTIVITIES: CPT

## 2022-09-20 PROCEDURE — 6370000000 HC RX 637 (ALT 250 FOR IP): Performed by: ORTHOPAEDIC SURGERY

## 2022-09-20 PROCEDURE — 97162 PT EVAL MOD COMPLEX 30 MIN: CPT

## 2022-09-20 PROCEDURE — 96374 THER/PROPH/DIAG INJ IV PUSH: CPT

## 2022-09-20 RX ADMIN — KETOROLAC TROMETHAMINE 15 MG: 30 INJECTION, SOLUTION INTRAMUSCULAR at 03:16

## 2022-09-20 RX ADMIN — KETOROLAC TROMETHAMINE 15 MG: 30 INJECTION, SOLUTION INTRAMUSCULAR at 19:45

## 2022-09-20 RX ADMIN — ACETAMINOPHEN 650 MG: 325 TABLET ORAL at 08:00

## 2022-09-20 RX ADMIN — CEFAZOLIN 2000 MG: 10 INJECTION, POWDER, FOR SOLUTION INTRAVENOUS at 03:17

## 2022-09-20 RX ADMIN — HYDROCODONE BITARTRATE AND ACETAMINOPHEN 1 TABLET: 10; 325 TABLET ORAL at 09:11

## 2022-09-20 RX ADMIN — ACETAMINOPHEN 650 MG: 325 TABLET ORAL at 14:16

## 2022-09-20 RX ADMIN — BISACODYL 5 MG: 5 TABLET, COATED ORAL at 08:02

## 2022-09-20 RX ADMIN — KETOROLAC TROMETHAMINE 15 MG: 30 INJECTION, SOLUTION INTRAMUSCULAR at 08:01

## 2022-09-20 RX ADMIN — DEXTROSE AND SODIUM CHLORIDE: 5; 450 INJECTION, SOLUTION INTRAVENOUS at 22:34

## 2022-09-20 RX ADMIN — POLYETHYLENE GLYCOL 3350 34 G: 17 POWDER, FOR SOLUTION ORAL at 20:42

## 2022-09-20 RX ADMIN — ACETAMINOPHEN 650 MG: 325 TABLET ORAL at 03:16

## 2022-09-20 RX ADMIN — ACETAMINOPHEN 650 MG: 325 TABLET ORAL at 19:45

## 2022-09-20 RX ADMIN — FAMOTIDINE 20 MG: 20 TABLET, FILM COATED ORAL at 20:42

## 2022-09-20 RX ADMIN — FLUTICASONE PROPIONATE 1 SPRAY: 50 SPRAY, METERED NASAL at 09:11

## 2022-09-20 RX ADMIN — AMLODIPINE BESYLATE 10 MG: 5 TABLET ORAL at 08:03

## 2022-09-20 RX ADMIN — DEXTROSE AND SODIUM CHLORIDE: 5; 450 INJECTION, SOLUTION INTRAVENOUS at 11:34

## 2022-09-20 RX ADMIN — KETOROLAC TROMETHAMINE 15 MG: 30 INJECTION, SOLUTION INTRAMUSCULAR at 14:16

## 2022-09-20 RX ADMIN — HYDROMORPHONE HYDROCHLORIDE 0.5 MG: 2 INJECTION, SOLUTION INTRAMUSCULAR; INTRAVENOUS; SUBCUTANEOUS at 05:08

## 2022-09-20 RX ADMIN — POLYETHYLENE GLYCOL 3350 34 G: 17 POWDER, FOR SOLUTION ORAL at 08:03

## 2022-09-20 RX ADMIN — HYDROCODONE BITARTRATE AND ACETAMINOPHEN 1 TABLET: 10; 325 TABLET ORAL at 19:45

## 2022-09-20 RX ADMIN — HYDROMORPHONE HYDROCHLORIDE 0.5 MG: 2 INJECTION, SOLUTION INTRAMUSCULAR; INTRAVENOUS; SUBCUTANEOUS at 00:58

## 2022-09-20 RX ADMIN — FAMOTIDINE 20 MG: 10 INJECTION, SOLUTION INTRAVENOUS at 08:03

## 2022-09-20 ASSESSMENT — PAIN DESCRIPTION - ORIENTATION
ORIENTATION: RIGHT
ORIENTATION: RIGHT
ORIENTATION: LOWER;RIGHT
ORIENTATION: LOWER;MID
ORIENTATION: POSTERIOR;LOWER
ORIENTATION: RIGHT

## 2022-09-20 ASSESSMENT — PAIN DESCRIPTION - DESCRIPTORS
DESCRIPTORS: SORE;STABBING;THROBBING
DESCRIPTORS: ACHING
DESCRIPTORS: SORE;THROBBING

## 2022-09-20 ASSESSMENT — PAIN - FUNCTIONAL ASSESSMENT
PAIN_FUNCTIONAL_ASSESSMENT: PREVENTS OR INTERFERES SOME ACTIVE ACTIVITIES AND ADLS
PAIN_FUNCTIONAL_ASSESSMENT: PREVENTS OR INTERFERES SOME ACTIVE ACTIVITIES AND ADLS

## 2022-09-20 ASSESSMENT — PAIN SCALES - GENERAL
PAINLEVEL_OUTOF10: 6
PAINLEVEL_OUTOF10: 7
PAINLEVEL_OUTOF10: 8

## 2022-09-20 ASSESSMENT — PAIN DESCRIPTION - LOCATION
LOCATION: BACK;LEG
LOCATION: LEG
LOCATION: LEG
LOCATION: BACK
LOCATION: LEG
LOCATION: BACK

## 2022-09-20 NOTE — PROGRESS NOTES
ORTHO PROGRESS NOTE    2022    Admit Date: 2022  Post Op day: 1 Day Post-Op      Subjective:     Miguelangel Pham is a patient who is now 1 Day Post-Op  and has complaints of numbness in the right leg . Objective:     PT/OT:    Progressing    Vital Signs:    Patient Vitals for the past 8 hrs:   BP Temp Temp src Pulse Resp SpO2   22 1514 139/85 98.4 °F (36.9 °C) Oral 79 20 97 %   22 1215 (!) 142/83 98.2 °F (36.8 °C) Oral 83 20 97 %   22 0941 -- -- -- -- 20 --     Temp (24hrs), Av.2 °F (36.8 °C), Min:97.5 °F (36.4 °C), Max:98.6 °F (37 °C)      LAB:    Recent Labs     22  0511   HGB 12.4*       Physical Exam:    Awake and in no acute distress. Mood and affect appropriate. Respirations unlabored and no evidence cyanosis. Calves nontender. Abdomen soft and nontender. Dressing clean/dry  No new neurologic deficit. Assessment:      1 Day Post-Op STATUS POST Procedure(s):  LUMBAR 4- SACRAL 1  LAMINECTOMY FUSION WITH  TRANSFORAMINAL LUMBAR INTERBODY FUSION POSTERIOR ONE LEVEL      Plan:     Continue course.   Anticipate discharge to: HOME Thursday      Signed By: Ailyn Perez MD

## 2022-09-20 NOTE — PROGRESS NOTES
END OF SHIFT SUMMARY:    Significant vitals this shift:  vss  Significant labs this shift:  none  Tests performed this shift:  none  Orders to be followed up on:  none  Blood products given this shift:  none  Additional events this shift:   Vss pt complain of pain X's 1  gave medication as ordered in [facility] pt up in chair during shift back in bed voiding well no BM during shift back in bed call light in reach     I/Os:  +/- this shift: yes  09/20 0701 - 09/20 1900  In: 2051 [P.O.:760;  I.V.:1291]  Out: 200 [Urine:1000; Drains:40]  Occurrences this Shift:  Urine yes, BM no, Emesis no    Roosevelt Paget, RN

## 2022-09-20 NOTE — PROGRESS NOTES
PHYSICAL THERAPY Daily Note and PM  (Link to Caseload Tracking: PT Visit Days : 1  Acknowledge Orders  Time In/Out  PT Charge Capture  Rehab Caseload Tracker    Carissa Espinal is a 73 y.o. 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 Jennifer Desai is 6'5\", he was using a cane with several falls PTA. This morning he is complaining of a lot of pain in his right leg and numbness in his right leg. With weakness as well. This PT perfect served the MD just to make them aware. He is difficult to instruct as he moves without waiting on instruction. He had to use a walker, he was able to don his brace and he walked to the bathroom and back. Quite unsteady needing min assist and dragging his right leg. Mr. Jennifer Desai is functioning below baseline and is therefore appropriate for skilled PT to maximize his rehab potential.  Mr. Jennifer Desai will require a rolling walker at discharge along with HHPT. PM-Mr. Jennifer Desai is feeling a lot better this afternoon. He is more steady on his feet, still needs the walker though. Able to increase his distance to 200 ft. Educated him again on precautions. Using brace, and log roll.        [address] Mobility Inpatient Short Form  [facility] Mobility Inpatient   How much difficulty turning over in bed?: A Little  How much difficulty sitting down on / standing up from a chair with arms?: A Little  How much difficulty moving from lying on back to sitting on side of bed?: A Little  How much help from another person moving to and from a bed to a chair?: A Little  How much help from another person needed to walk in hospital room?: A Little  How much help from another person for climbing 3-5 steps with a railing?: A Little  [facility] Inpatient Mobility Raw Score : 18  AM-PAC Inpatient T-Scale Score : 43.63  Mobility Inpatient CMS 0-100% Score: 46.58  Mobility Inpatient CMS G-Code Modifier : CK    SUBJECTIVE:   Mr. Bonnie Fishman states, \"My leg feels a lot better this afternoon. \"          Social/Functional Lives With: Spouse  Type of Home: House  Home Layout: One level  Bathroom Toilet: Standard  Bathroom Accessibility: Accessible  Receives Help From: Family  ADL Assistance: Independent  Homemaking Assistance: Independent  Ambulation Assistance: Independent  Transfer Assistance: Independent  Active : Yes  Mode of Transportation: Car  Occupation: Retired    OBJECTIVE:     PAIN: VITALS / O2: PRECAUTION / [place] [address]:   Pre Treatment:    2      Post Treatment: 2 Vitals        Oxygen      None    RESTRICTIONS/PRECAUTIONS:                    GROSS EVALUATION: Intact Impaired (Comments):   AROM []  Weak right leg   PROM []    Strength []  Weak right leg   Balance []     Posture [] N/A   Sensation []  Numbness right leg   Coordination []      Tone []     Edema []    Activity Tolerance []      []      COGNITION/  PERCEPTION: Intact Impaired (Comments):   Orientation [x]     Vision [x]     Hearing [x]     Cognition  [x]       MOBILITY: I Mod I S SBA CGA Min Mod Max Total  NT x2 Comments:   Bed Mobility    Rolling [] [] [] [] [] [] [] [] [] [x] []    Supine to Sit [] [] [] [] [] [] [] [] [] [x] []    Scooting [] [] [] [x] [] [] [] [] [] [] [] Sit to Supine [] [] [] [] [x] [] [] [] [] [] []    Transfers    Sit to Stand [] [] [] [x] [] [] [] [] [] [] []    Bed to Chair [] [] [] [x] [] [] [] [] [] [] []    Stand to Sit [] [] [] [x] [] [] [] [] [] [] []     [] [] [] [] [] [] [] [] [] [] []    I=Independent, Mod I=Modified Independent, S=Supervision, SBA=Standby Assistance, CGA=Contact Guard Assistance,   Min=Minimal Assistance, Mod=Moderate Assistance, Max=Maximal Assistance, Total=Total Assistance, NT=Not Tested    GAIT: I Mod I S SBA CGA Min Mod Max Total  NT x2 Comments:   Level of Assistance [] [] [] [] [] [x] [] [] [] [] []    Distance 200 feet x 2    DME Rolling Walker    Gait Quality Decreased darwin , Decreased step clearance, Decreased step length, and Narrow base of support    Weightbearing Status      Stairs      I=Independent, Mod I=Modified Independent, S=Supervision, SBA=Standby Assistance, CGA=Contact Guard Assistance,   Min=Minimal Assistance, Mod=Moderate Assistance, Max=Maximal Assistance, Total=Total Assistance, NT=Not Tested    PLAN:   ACUTE PHYSICAL THERAPY GOALS:   (Developed with and agreed upon by patient and/or caregiver.)   Mr. Silvana Garcia will perform supine to sit and sit to supine independently in 7 days. Mr. Silvana Garcia will perform sit to stand and bed to chair with rolling walker independently in 7 days. Mr. Silvana Garcia will perform gait 500 ft independently with rolling walker in 7 days.      FREQUENCY AND DURATION: BID for duration of hospital stay or until stated goals are met, whichever comes first.    THERAPY PROGNOSIS: Good    PROBLEM LIST:   (Skilled intervention is medically necessary to address:)  Decreased Activity Tolerance  Decreased AROM/PROM  Decreased Gait Ability  Decreased Safety Awareness  Decreased Transfer Abilities INTERVENTIONS PLANNED:   (Benefits and precautions of physical therapy have been discussed with the patient.)  Therapeutic Activity  Therapeutic Exercise/HEP  Neuromuscular

## 2022-09-20 NOTE — PROGRESS NOTES
PHYSICAL THERAPY Initial Assessment, Daily Note, and AM  (Link to Caseload Tracking: PT Visit Days : 1  Acknowledge Orders  Time In/Out  PT Charge Capture  Rehab Caseload Tracker    Alejandra Morgan is a 73 y.o. 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 Rosario Lira is 6'5\", he was using a cane with several falls PTA. This morning he is complaining of a lot of pain in his right leg and numbness in his right leg. With weakness as well. This PT perfect served the MD just to make them aware. He is difficult to instruct as he moves without waiting on instruction. He had to use a walker, he was able to don his brace and he walked to the bathroom and back. Quite unsteady needing min assist and dragging his right leg. Mr. Rosario Lira is functioning below baseline and is therefore appropriate for skilled PT to maximize his rehab potential.  Mr. Rosario Lira will require a rolling walker at discharge along with HHPT.      [facility] AM-PAC 6 Clicks Basic Mobility Inpatient Short Form  AM-PAC Mobility Inpatient   How much difficulty turning over in bed?: A Little  How much difficulty sitting down on / standing up from a chair with arms?: A Little  How much difficulty moving from lying on back to sitting on side of bed?: A Little  How much help from another person moving to and from a bed to a chair?: A Little  How much help from another person needed to walk in hospital room?: A Little  How much help from another person for climbing 3-5 steps with a railing?: A Little  AM-PAC Inpatient Mobility Raw Score : 18  AM-PAC Inpatient T-Scale Score : 43.63  Mobility Inpatient CMS 0-100% Score: 46.58  Mobility Inpatient CMS G-Code Modifier : CK    SUBJECTIVE:   Mr. Gordo Ulrich states, \"its a lot worse than it was before surgery\"   referring to his right leg    Social/Functional Lives With: Spouse  Type of Home: House  Home Layout: One level  Bathroom Toilet: Standard  Bathroom Accessibility: Accessible  Receives Help From: Family  ADL Assistance: Independent  Homemaking Assistance: Independent  Ambulation Assistance: Independent  Transfer Assistance: Independent  Active : Yes  Mode of Transportation: Car  Occupation: Retired    OBJECTIVE:     PAIN: VITALS / O2: [place] / [place] / [place]:   Pre Treatment:    7      Post Treatment: 7 Vitals        Oxygen      None    RESTRICTIONS/PRECAUTIONS:                    GROSS EVALUATION: Intact Impaired (Comments):   AROM []  Weak right leg   PROM []    Strength []  Weak right leg   Balance []     Posture [] N/A   Sensation []  Numbness right leg   Coordination []      Tone []     Edema []    Activity Tolerance []      []      COGNITION/  PERCEPTION: Intact Impaired (Comments):   Orientation [x]     Vision [x]     Hearing [x]     Cognition  [x]       MOBILITY: I Mod I S SBA CGA Min Mod Max Total  NT x2 Comments:   Bed Mobility    Rolling [] [] [] [] [] [] [] [] [] [x] []    Supine to Sit [] [] [] [] [] [] [] [] [] [x] []    Scooting [] [] [] [] [] [] [] [] [] [x] []    Sit to Supine [] [] [] [] [] [] [] [] [] [x] []    Transfers    Sit to Stand [] [] [] [] [] [x] [] [] [] [] []    Bed to Chair [] [] [] [] [] [x] [] [] [] [] []    Stand to Sit [] [] [] [] [] [x] [] [] [] [] []     [] [] [] [] [] [] [] [] [] [] []    I=Independent, Mod I=Modified Independent, S=Supervision, SBA=Standby Assistance, CGA=Contact Guard Assistance,   Min=Minimal Assistance, Mod=Moderate Assistance, Max=Maximal Assistance, Total=Total Assistance, NT=Not Tested    GAIT: I Mod I S SBA CGA Min Mod Max Total  NT x2 Comments:   Level of Assistance [] [] [] [] [] [x] [] [] [] [] []    Distance 10 feet x 2    DME Rolling Walker    Gait Quality Decreased darwin , Decreased step clearance, Decreased step length, and Narrow base of support    Weightbearing Status      Stairs      I=Independent, Mod I=Modified Independent, S=Supervision, SBA=Standby Assistance, CGA=Contact Guard Assistance,   Min=Minimal Assistance, Mod=Moderate Assistance, Max=Maximal Assistance, Total=Total Assistance, NT=Not Tested    PLAN:   ACUTE PHYSICAL THERAPY GOALS:   (Developed with and agreed upon by patient and/or caregiver.)   Mr. James Noland will perform supine to sit and sit to supine independently in 7 days. Mr. James Noland will perform sit to stand and bed to chair with rolling walker independently in 7 days. Mr. James Noland will perform gait 500 ft independently with rolling walker in 7 days. FREQUENCY AND DURATION: BID for duration of hospital stay or until stated goals are met, whichever comes first.    THERAPY PROGNOSIS: Good    PROBLEM LIST:   (Skilled intervention is medically necessary to address:)  Decreased Activity Tolerance  Decreased AROM/PROM  Decreased Gait Ability  Decreased Safety Awareness  Decreased Transfer Abilities INTERVENTIONS PLANNED:   (Benefits and precautions of physical therapy have been discussed with the patient.)  Therapeutic Activity  Therapeutic Exercise/HEP  Neuromuscular Re-education  Gait Training  Education       TREATMENT:   EVALUATION: MODERATE COMPLEXITY: (Untimed Charge)    TREATMENT:   Therapeutic Activity (20 Minutes):  Therapeutic activity included Transfer Training, Ambulation on level ground, Sitting balance , and Standing balance to improve functional Balance, Mobility, and Strength.     TREATMENT GRID:  N/A    AFTER TREATMENT PRECAUTIONS: Call light within reach, Chair, Needs within reach, RN notified, and Visitors at bedside    INTERDISCIPLINARY COLLABORATION:  RN/ PCT and PT/ PTA    EDUCATION: Education Given To: Patient  Education Provided: Role of Therapy  Education Method: Verbal  Barriers to Learning: None  Education Outcome: Verbalized understanding    TIME IN/OUT:  Time In: 1135  Time Out: [address]  Minutes: [address], [address] [facility]

## 2022-09-20 NOTE — CARE COORDINATION
Chart screened by CM for d/c planning. 74 y/o male pt who resides with his spouse Magali Mckeon [phone]. Pt has insurance with pharmacy benefits and a PCP. Pt admitted under OBS status for the following procedure: \"1. Bilateral L4, L5, and S1 laminectomy with partial facetectomy and extensive foraminotomy bilaterally from L4 through S1, 2. Posterolateral spinal fusion of L4-L5 and L5-S1, 3. Placement of segmental spinal instrumentation from L4 to S1 using the Jimy cortical screw and edith system. \"  Pre-Op Dx: L4-L5 and L5-S1 with spondylolisthesis and spinal stenosis and severe degenerative disk disease. Post-Op Dx:  L4-L5 and L5-S1 with spondylolisthesis and spinal stenosis and severe degenerative disk disease with notable foraminal stenosis bilaterally from L4 through S1.  CM is awaiting PT recommendations. Anticipated d/c plan is for pt to return home when medically stable. CM will continue to follow and remain available if any needs arise. 09/20/22 1008   Service Assessment   Patient Orientation Alert and Oriented   Cognition Alert   History Provided By Medical Record   Primary Caregiver Self   Support Systems Spouse/Significant Other;Family Members   Patient's Healthcare Decision Maker is: Legal Next of [address] 70   PCP Verified by CM Yes  Vidhi Tao MD)   Last Visit to PCP Within last 3 months   Prior Functional Level Independent in ADLs/IADLs   Current Functional Level Independent in ADLs/IADLs   Can patient return to prior living arrangement Yes   Ability to make needs known: Good   Family able to assist with home care needs: Yes   Would you like for me to discuss the discharge plan with any other family members/significant others, and if so, who?  No   Financial Resources Other (Comment)  ([employer] Medicare)   Social/Functional History   Lives With Spouse   Type of [address] One level   Bathroom Toilet Standard   Bathroom Accessibility Accessible   Receives Help From Family ADL Assistance Independent   Homemaking Assistance Independent   Ambulation Assistance Independent   Transfer Assistance Independent   Active  Yes   Mode of Transportation Car   Occupation Retired   Discharge Planning   Type of [address] Prior To Admission None   Potential Assistance Needed N/A   DME Ordered? No   Potential Assistance Purchasing Medications No   Type of Home Care Services None   Patient expects to be discharged to: House   One/Two Story Residence One story   History of falls? 0   Services At/After Discharge   Transition of Care Consult (CM Consult) Discharge [facility] Discharge None    Resource Information Provided? No   Mode of Transport at Discharge Other (see comment)  (Family)   Confirm Follow Up Transport Family   Condition of Participation: Discharge Planning   The Plan for Transition of Care is related to the following treatment goals: Pt to obtain care to become medically stable and to return with a safe transition. The Patient and/or Patient Representative was provided with a Choice of Provider? Patient   Freedom of Choice list was provided with basic dialogue that supports the patient's individualized plan of care/goals, treatment preferences, and shares the quality data associated with the providers?   Yes

## 2022-09-21 PROBLEM — Z98.1 S/P LUMBAR FUSION: Status: ACTIVE | Noted: 2022-09-21

## 2022-09-21 PROCEDURE — 96376 TX/PRO/DX INJ SAME DRUG ADON: CPT

## 2022-09-21 PROCEDURE — 6360000002 HC RX W HCPCS: Performed by: ORTHOPAEDIC SURGERY

## 2022-09-21 PROCEDURE — G0378 HOSPITAL OBSERVATION PER HR: HCPCS

## 2022-09-21 PROCEDURE — 6370000000 HC RX 637 (ALT 250 FOR IP): Performed by: ORTHOPAEDIC SURGERY

## 2022-09-21 PROCEDURE — 2580000003 HC RX 258: Performed by: ORTHOPAEDIC SURGERY

## 2022-09-21 PROCEDURE — 1100000000 HC RM PRIVATE

## 2022-09-21 PROCEDURE — 97530 THERAPEUTIC ACTIVITIES: CPT

## 2022-09-21 PROCEDURE — 2500000003 HC RX 250 WO HCPCS: Performed by: ORTHOPAEDIC SURGERY

## 2022-09-21 RX ORDER — GABAPENTIN 100 MG/1
100 CAPSULE ORAL 3 TIMES DAILY
Status: DISCONTINUED | OUTPATIENT
Start: 2022-09-21 | End: 2022-09-23

## 2022-09-21 RX ADMIN — TUBERCULIN PURIFIED PROTEIN DERIVATIVE 5 UNITS: 5 INJECTION, SOLUTION INTRADERMAL at 12:30

## 2022-09-21 RX ADMIN — FAMOTIDINE 20 MG: 20 TABLET, FILM COATED ORAL at 08:50

## 2022-09-21 RX ADMIN — FLUTICASONE PROPIONATE 1 SPRAY: 50 SPRAY, METERED NASAL at 08:56

## 2022-09-21 RX ADMIN — BISACODYL 5 MG: 5 TABLET, COATED ORAL at 08:51

## 2022-09-21 RX ADMIN — FAMOTIDINE 20 MG: 20 TABLET, FILM COATED ORAL at 20:55

## 2022-09-21 RX ADMIN — GABAPENTIN 100 MG: 100 CAPSULE ORAL at 13:49

## 2022-09-21 RX ADMIN — GABAPENTIN 100 MG: 100 CAPSULE ORAL at 20:55

## 2022-09-21 RX ADMIN — ACETAMINOPHEN 650 MG: 325 TABLET ORAL at 20:53

## 2022-09-21 RX ADMIN — KETOROLAC TROMETHAMINE 15 MG: 30 INJECTION, SOLUTION INTRAMUSCULAR at 13:50

## 2022-09-21 RX ADMIN — HYDROCODONE BITARTRATE AND ACETAMINOPHEN 1 TABLET: 10; 325 TABLET ORAL at 15:46

## 2022-09-21 RX ADMIN — AMLODIPINE BESYLATE 10 MG: 5 TABLET ORAL at 08:50

## 2022-09-21 RX ADMIN — KETOROLAC TROMETHAMINE 15 MG: 30 INJECTION, SOLUTION INTRAMUSCULAR at 20:53

## 2022-09-21 RX ADMIN — KETOROLAC TROMETHAMINE 15 MG: 30 INJECTION, SOLUTION INTRAMUSCULAR at 03:06

## 2022-09-21 RX ADMIN — GABAPENTIN 100 MG: 100 CAPSULE ORAL at 10:34

## 2022-09-21 RX ADMIN — ACETAMINOPHEN 650 MG: 325 TABLET ORAL at 08:49

## 2022-09-21 RX ADMIN — DEXTROSE AND SODIUM CHLORIDE: 5; 450 INJECTION, SOLUTION INTRAVENOUS at 18:01

## 2022-09-21 RX ADMIN — POLYETHYLENE GLYCOL 3350 34 G: 17 POWDER, FOR SOLUTION ORAL at 08:51

## 2022-09-21 RX ADMIN — KETOROLAC TROMETHAMINE 15 MG: 30 INJECTION, SOLUTION INTRAMUSCULAR at 08:49

## 2022-09-21 RX ADMIN — DEXTROSE AND SODIUM CHLORIDE: 5; 450 INJECTION, SOLUTION INTRAVENOUS at 07:29

## 2022-09-21 RX ADMIN — ACETAMINOPHEN 650 MG: 325 TABLET ORAL at 13:49

## 2022-09-21 RX ADMIN — ACETAMINOPHEN 650 MG: 325 TABLET ORAL at 03:05

## 2022-09-21 ASSESSMENT — PAIN DESCRIPTION - ORIENTATION
ORIENTATION: POSTERIOR
ORIENTATION: RIGHT
ORIENTATION: RIGHT;LEFT;LOWER

## 2022-09-21 ASSESSMENT — PAIN DESCRIPTION - PAIN TYPE: TYPE: ACUTE PAIN

## 2022-09-21 ASSESSMENT — PAIN DESCRIPTION - LOCATION
LOCATION: BACK;LEG
LOCATION: BACK
LOCATION: HIP;KNEE
LOCATION: LEG;BACK

## 2022-09-21 ASSESSMENT — PAIN - FUNCTIONAL ASSESSMENT: PAIN_FUNCTIONAL_ASSESSMENT: PREVENTS OR INTERFERES SOME ACTIVE ACTIVITIES AND ADLS

## 2022-09-21 ASSESSMENT — PAIN DESCRIPTION - DESCRIPTORS: DESCRIPTORS: ACHING;STABBING

## 2022-09-21 ASSESSMENT — PAIN DESCRIPTION - ONSET: ONSET: GRADUAL

## 2022-09-21 ASSESSMENT — PAIN SCALES - GENERAL
PAINLEVEL_OUTOF10: 6
PAINLEVEL_OUTOF10: 6
PAINLEVEL_OUTOF10: 5

## 2022-09-21 ASSESSMENT — PAIN DESCRIPTION - FREQUENCY: FREQUENCY: INTERMITTENT

## 2022-09-21 NOTE — PROGRESS NOTES
END OF SHIFT SUMMARY:    Significant vitals this shift:  vss  Significant labs this shift:  none  Tests performed this shift:  none  Orders to be followed up on:  none  Blood products given this shift:  none  Additional events this shift:   Vss pt A&O X's 4 complain of pain X's 2 gave medication as ordered in mar no BM during shift voiding well resting in bed lowest position call light in reach     I/Os:  +/- this shift: yes  09/21 0701 - 09/21 1900  In: 600 [P.O.:600]  Out: 6194 [Urine:2600; Drains:20]  Occurrences this Shift:  Urine yes, BM no, Emesis 0    Rox Kaplan RN

## 2022-09-21 NOTE — PROGRESS NOTES
ACUTE PHYSICAL THERAPY GOALS:   (Developed with and agreed upon by patient and/or caregiver.)   Mr. Leonor Irvin will perform supine to sit and sit to supine independently in 7 days. Mr. Leonor Irvin will perform sit to stand and bed to chair with rolling walker independently in 7 days. Mr. Leonor Irvin will perform gait 500 ft independently with rolling walker in 7 days. PHYSICAL THERAPY: Daily Note PM   (Link to Caseload Tracking: PT Visit Days : 2  Time In/Out PT Charge Capture  Rehab Caseload Tracker  Orders    Alejandra Bowers is a 73 y.o. 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 Leonor Irvin attempted to roll first multiple times to his left then multiple times to the right. He was unable to crying out in pain and lying back flat in bed. Pt then worked on rolling side to side. SUBJECTIVE:   Mr. Leonor Irvin states \"I can't\".      Social/Functional Lives With: Alone  Type of Home: House  Home Layout: One level  Bathroom Toilet: Standard  Bathroom Accessibility: Accessible  Receives Help From: Family, Other (comment) (Per pt he resides alone and family is in [place])  ADL Assistance: Independent  Homemaking Assistance: Independent  Ambulation Assistance: Needs assistance  Transfer Assistance: Needs assistance  Active : Yes  Mode of Transportation: Car  Occupation: Retired  OBJECTIVE:     PAIN: VITALS / O2: PRECAUTION / Terrilyn Banker / [employer] Hidden:   Pre Treatment:    0      Post Treatment: 0 Vitals        Oxygen    None    RESTRICTIONS/PRECAUTIONS:        MOBILITY: I Mod I S SBA CGA Min Mod Max Total  NT x2 Comments:   Bed Mobility    Rolling [] [] [] [] [] [] [] [] [] [] []    Supine to Sit [] [] [] [] [] [] [] [] [] [] []    Scooting [] [] [] [] [] [] [] [] [] [] []    Sit to Supine [] [] [] [] [] [] [] [] [] [] []    Transfers    Sit to Stand [] [] [] [] [] [] [] [] [] [] []    Bed to Chair [] [] [] [] [] [] [] [] [] [] []    Stand to Sit [] [] [] [] [] [] [] [] [] [] []     [] [] [] [] [] [] [] [] [] [] []    I=Independent, Mod I=Modified Independent, S=Supervision, SBA=Standby Assistance, CGA=Contact Guard Assistance,   Min=Minimal Assistance, Mod=Moderate Assistance, Max=Maximal Assistance, Total=Total Assistance, NT=Not Tested    BALANCE: Good Fair+ Fair Fair- Poor NT Comments   Sitting Static [] [] [] [] [] []    Sitting Dynamic [] [] [] [] [] []              Standing Static [] [] [] [] [] []    Standing Dynamic [] [] [] [] [] []      GAIT: I Mod I S SBA CGA Min Mod Max Total  NT x2 Comments:   Level of Assistance [] [] [] [] [] [] [] [] [] [] []    Distance   feet    DME N/A    Gait Quality N/A    Weightbearing Status      Stairs      I=Independent, Mod I=Modified Independent, S=Supervision, SBA=Standby Assistance, CGA=Contact Guard Assistance,   Min=Minimal Assistance, Mod=Moderate Assistance, Max=Maximal Assistance, Total=Total Assistance, NT=Not Tested    PLAN:   FREQUENCY AND DURATION: BID for duration of hospital stay or until stated goals are met, whichever comes first.    TREATMENT:   TREATMENT:   Therapeutic Activity (38 Minutes): Therapeutic activity included Rolling and bed mobility to improve functional Activity tolerance, Mobility, and Strength.     TREATMENT GRID:  .    AFTER TREATMENT PRECAUTIONS: Bed    INTERDISCIPLINARY COLLABORATION:  RN/ PCT and PT/ PTA    EDUCATION:      TIME IN/OUT:  Time In: 1430  Time Out: 1508  Minutes: 6225 Benito Degroot, PTA

## 2022-09-21 NOTE — PROGRESS NOTES
Physical therapy note: Attempted PT this AM x 2. Pt adamantly refuses to get up 2° pain in R LE. On second attempt he stated he was not supposed to get up and adamantly stated he would not.  Will attempt again this PM.    Johanny Alcaraz, PTA    Rehab Caseload Tracker

## 2022-09-21 NOTE — PROGRESS NOTES
ORTHO PROGRESS NOTE    2022    Admit Date: 2022  Post Op day: 2 Days Post-Op      Subjective:     Kenneth Arango is a patient who is now 2 Days Post-Op  and has complaints of right leg pain and numbness . Objective:     PT/OT:    Progressing    Vital Signs:    Patient Vitals for the past 8 hrs:   BP Temp Temp src Pulse Resp SpO2   22 0808 (!) 149/80 99 °F (37.2 °C) -- 64 18 97 %   22 0326 (!) 154/84 98.2 °F (36.8 °C) Oral 70 18 100 %     Temp (24hrs), Av.6 °F (37 °C), Min:98.2 °F (36.8 °C), Max:99 °F (37.2 °C)      LAB:    Recent Labs     22  0511   HGB 12.4*       Physical Exam:    Awake and in no acute distress. Mood and affect appropriate. Respirations unlabored and no evidence cyanosis. Calves nontender. Abdomen soft and nontender. Dressing clean/dry  Right leg numbness in L4-5 dermatomes.     Assessment:      2 Days Post-Op STATUS POST Procedure(s):  LUMBAR 4- SACRAL 1  LAMINECTOMY FUSION WITH  TRANSFORAMINAL LUMBAR INTERBODY FUSION POSTERIOR ONE LEVEL      Plan:     Gabapentin and Toradol  Anticipate discharge to: SNF      Signed By: Rajiv Solano MD

## 2022-09-22 ENCOUNTER — APPOINTMENT (OUTPATIENT)
Dept: MRI IMAGING | Age: 67
DRG: 460 | End: 2022-09-22
Attending: ORTHOPAEDIC SURGERY
Payer: MEDICARE

## 2022-09-22 LAB
MM INDURATION, POC: 0 MM (ref 0–5)
PPD, POC: NEGATIVE

## 2022-09-22 PROCEDURE — 6370000000 HC RX 637 (ALT 250 FOR IP): Performed by: ORTHOPAEDIC SURGERY

## 2022-09-22 PROCEDURE — A9579 GAD-BASE MR CONTRAST NOS,1ML: HCPCS | Performed by: PHYSICIAN ASSISTANT

## 2022-09-22 PROCEDURE — 6360000002 HC RX W HCPCS: Performed by: ORTHOPAEDIC SURGERY

## 2022-09-22 PROCEDURE — 6360000004 HC RX CONTRAST MEDICATION: Performed by: PHYSICIAN ASSISTANT

## 2022-09-22 PROCEDURE — 72158 MRI LUMBAR SPINE W/O & W/DYE: CPT

## 2022-09-22 PROCEDURE — 6360000002 HC RX W HCPCS: Performed by: PHYSICIAN ASSISTANT

## 2022-09-22 PROCEDURE — 97530 THERAPEUTIC ACTIVITIES: CPT

## 2022-09-22 PROCEDURE — 1100000000 HC RM PRIVATE

## 2022-09-22 PROCEDURE — 2580000003 HC RX 258: Performed by: ORTHOPAEDIC SURGERY

## 2022-09-22 RX ORDER — DEXAMETHASONE SODIUM PHOSPHATE 10 MG/ML
8 INJECTION INTRAMUSCULAR; INTRAVENOUS EVERY 8 HOURS
Status: DISPENSED | OUTPATIENT
Start: 2022-09-22 | End: 2022-09-23

## 2022-09-22 RX ADMIN — HYDROMORPHONE HYDROCHLORIDE 0.5 MG: 2 INJECTION, SOLUTION INTRAMUSCULAR; INTRAVENOUS; SUBCUTANEOUS at 12:46

## 2022-09-22 RX ADMIN — HYDROMORPHONE HYDROCHLORIDE 0.5 MG: 2 INJECTION, SOLUTION INTRAMUSCULAR; INTRAVENOUS; SUBCUTANEOUS at 08:30

## 2022-09-22 RX ADMIN — GADOTERIDOL 18 ML: 279.3 INJECTION, SOLUTION INTRAVENOUS at 19:52

## 2022-09-22 RX ADMIN — KETOROLAC TROMETHAMINE 15 MG: 30 INJECTION, SOLUTION INTRAMUSCULAR at 08:34

## 2022-09-22 RX ADMIN — ACETAMINOPHEN 650 MG: 325 TABLET ORAL at 08:34

## 2022-09-22 RX ADMIN — POLYETHYLENE GLYCOL 3350 34 G: 17 POWDER, FOR SOLUTION ORAL at 08:33

## 2022-09-22 RX ADMIN — ACETAMINOPHEN 650 MG: 325 TABLET ORAL at 21:32

## 2022-09-22 RX ADMIN — FLUTICASONE PROPIONATE 1 SPRAY: 50 SPRAY, METERED NASAL at 08:41

## 2022-09-22 RX ADMIN — ACETAMINOPHEN 650 MG: 325 TABLET ORAL at 02:41

## 2022-09-22 RX ADMIN — AMLODIPINE BESYLATE 10 MG: 5 TABLET ORAL at 08:34

## 2022-09-22 RX ADMIN — HYDROCODONE BITARTRATE AND ACETAMINOPHEN 1 TABLET: 10; 325 TABLET ORAL at 11:08

## 2022-09-22 RX ADMIN — FAMOTIDINE 20 MG: 20 TABLET, FILM COATED ORAL at 08:34

## 2022-09-22 RX ADMIN — DEXAMETHASONE SODIUM PHOSPHATE 8 MG: 10 INJECTION, SOLUTION INTRAMUSCULAR; INTRAVENOUS at 11:09

## 2022-09-22 RX ADMIN — KETOROLAC TROMETHAMINE 15 MG: 30 INJECTION, SOLUTION INTRAMUSCULAR at 02:41

## 2022-09-22 RX ADMIN — GABAPENTIN 100 MG: 100 CAPSULE ORAL at 08:34

## 2022-09-22 RX ADMIN — DEXTROSE AND SODIUM CHLORIDE: 5; 450 INJECTION, SOLUTION INTRAVENOUS at 03:56

## 2022-09-22 RX ADMIN — BISACODYL 5 MG: 5 TABLET, COATED ORAL at 08:34

## 2022-09-22 RX ADMIN — GABAPENTIN 100 MG: 100 CAPSULE ORAL at 14:47

## 2022-09-22 RX ADMIN — ACETAMINOPHEN 650 MG: 325 TABLET ORAL at 14:48

## 2022-09-22 ASSESSMENT — PAIN DESCRIPTION - LOCATION
LOCATION: BACK
LOCATION: KNEE;LEG
LOCATION: LEG
LOCATION: KNEE
LOCATION: LEG

## 2022-09-22 ASSESSMENT — PAIN SCALES - GENERAL
PAINLEVEL_OUTOF10: 3
PAINLEVEL_OUTOF10: 3
PAINLEVEL_OUTOF10: 10
PAINLEVEL_OUTOF10: 3
PAINLEVEL_OUTOF10: 6
PAINLEVEL_OUTOF10: 0
PAINLEVEL_OUTOF10: 5
PAINLEVEL_OUTOF10: 7
PAINLEVEL_OUTOF10: 0
PAINLEVEL_OUTOF10: 6

## 2022-09-22 ASSESSMENT — PAIN - FUNCTIONAL ASSESSMENT
PAIN_FUNCTIONAL_ASSESSMENT: PREVENTS OR INTERFERES WITH MANY ACTIVE NOT PASSIVE ACTIVITIES
PAIN_FUNCTIONAL_ASSESSMENT: PREVENTS OR INTERFERES WITH MANY ACTIVE NOT PASSIVE ACTIVITIES
PAIN_FUNCTIONAL_ASSESSMENT: ACTIVITIES ARE NOT PREVENTED
PAIN_FUNCTIONAL_ASSESSMENT: PREVENTS OR INTERFERES WITH MANY ACTIVE NOT PASSIVE ACTIVITIES
PAIN_FUNCTIONAL_ASSESSMENT: PREVENTS OR INTERFERES SOME ACTIVE ACTIVITIES AND ADLS

## 2022-09-22 ASSESSMENT — PAIN DESCRIPTION - ORIENTATION
ORIENTATION: RIGHT
ORIENTATION: LOWER;POSTERIOR
ORIENTATION: RIGHT

## 2022-09-22 ASSESSMENT — PAIN DESCRIPTION - PAIN TYPE
TYPE: ACUTE PAIN
TYPE: SURGICAL PAIN

## 2022-09-22 ASSESSMENT — PAIN DESCRIPTION - ONSET
ONSET: ON-GOING

## 2022-09-22 ASSESSMENT — PAIN DESCRIPTION - DESCRIPTORS
DESCRIPTORS: ACHING
DESCRIPTORS: STABBING;THROBBING;ACHING
DESCRIPTORS: ACHING
DESCRIPTORS: SHARP;STABBING
DESCRIPTORS: ACHING;STABBING;PRESSURE

## 2022-09-22 ASSESSMENT — PAIN DESCRIPTION - FREQUENCY
FREQUENCY: CONTINUOUS
FREQUENCY: INTERMITTENT
FREQUENCY: INTERMITTENT

## 2022-09-22 NOTE — PROGRESS NOTES
ACUTE PHYSICAL THERAPY GOALS:   (Developed with and agreed upon by patient and/or caregiver.)   Mr. Piedad Casas will perform supine to sit and sit to supine independently in 7 days. Mr. Piedad Casas will perform sit to stand and bed to chair with rolling walker independently in 7 days. Mr. Piedad Casas will perform gait 500 ft independently with rolling walker in 7 days. PHYSICAL THERAPY: Daily Note PM   (Link to Caseload Tracking: PT Visit Days : 3  Time In/Out PT Charge Capture  Rehab Caseload Tracker  Orders    Anat Morin is a 73 y.o. 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 Piedad Casas was supine upon arrival and agreeable to therapy. Patient received dilaudid prior to treatment. Log rolling CGA-min a needing light manual assistance to move LE and verbal cues for sequencing/hand placement. Once seated EOB patient declined to attempt sit to stand due to pain in RLE. Encouraged patient to sit EOB for positional change but he was unable to tolerance propping on L elbow. Patient then requested to lie down which he did so with min assist. Patient reported pain exacerbates with with activity/movement, recovers and absent at rest. Ortho aware. Progressing towards goals and will continue PT efforts.      SUBJECTIVE:   Mr. Piedad Casas states \"this pain is the only thing keeping me down\".      Social/Functional Lives With: Alone  Type of Home: House  Home Layout: One level  Bathroom Toilet: Standard  Bathroom Accessibility: Accessible  Receives Help From: Family, Other (comment) (Per pt he resides alone and family is in [place])  ADL Assistance: Independent  Homemaking Assistance: Independent  Ambulation Assistance: Needs assistance  Transfer Assistance: Needs assistance  Active : Yes  Mode of Transportation: Car  Occupation: Retired  OBJECTIVE:     PAIN: VITALS / O2: PRECAUTION / Clifm Jake / Lizzy Brooklyn:   Pre Treatment:   Pain Level: 0      Post Treatment: 0 Vitals        Oxygen    None    RESTRICTIONS/PRECAUTIONS:        MOBILITY: I Mod I S SBA CGA Min Mod Max Total  NT x2 Comments:   Bed Mobility    Rolling [] [] [] [] [x] [x] [] [] [] [] []    Supine to Sit [] [] [] [] [x] [x] [] [] [] [] []    Scooting [] [] [] [] [x] [x] [] [] [] [] []    Sit to Supine [] [] [] [] [x] [x] [] [] [] [] []    Transfers    Sit to Stand [] [] [] [] [] [] [] [] [] [x] []    Bed to Chair [] [] [] [] [] [] [] [] [] [x] []    Stand to Sit [] [] [] [] [] [] [] [] [] [x] []     [] [] [] [] [] [] [] [] [] [] []    I=Independent, Mod I=Modified Independent, S=Supervision, SBA=Standby Assistance, CGA=Contact Guard Assistance,   Min=Minimal Assistance, Mod=Moderate Assistance, Max=Maximal Assistance, Total=Total Assistance, NT=Not Tested    BALANCE: Good Fair+ Fair Fair- Poor NT Comments   Sitting Static [] [x] [] [] [] [] Prop sitting   Sitting Dynamic [] [] [x] [] [] []              Standing Static [] [] [] [] [] [x]    Standing Dynamic [] [] [] [] [] [x]      GAIT: I Mod I S SBA CGA Min Mod Max Total  NT x2 Comments:   Level of Assistance [] [] [] [] [] [] [] [] [] [x] []    Distance   feet    DME N/A    Gait Quality N/A    Weightbearing Status      Stairs      I=Independent, Mod I=Modified Independent, S=Supervision, SBA=Standby Assistance, CGA=Contact Guard Assistance,   Min=Minimal Assistance, Mod=Moderate Assistance, Max=Maximal Assistance, Total=Total Assistance, NT=Not Tested    PLAN:   FREQUENCY AND DURATION: BID for duration of hospital stay or until stated goals are met, whichever comes first.    TREATMENT:   TREATMENT:   Therapeutic Activity (12 Minutes): Therapeutic activity included Rolling, Supine to Sit, and Sitting balance  to improve functional Activity tolerance, Balance, Coordination, Mobility, and Strength. TREATMENT GRID:  .    AFTER TREATMENT PRECAUTIONS: Bed, Bed/Chair Locked, Call light within reach, and RN notified    INTERDISCIPLINARY COLLABORATION:  PT/ PTA and SPTA    EDUCATION:      TIME IN/OUT:  Time In: 6290  Time Out: 0463  Minutes: Jagruti 12.  MARTHA Avalos   [place], [place]

## 2022-09-22 NOTE — PROGRESS NOTES
ACUTE PHYSICAL THERAPY GOALS:   (Developed with and agreed upon by patient and/or caregiver.)   Mr. Rc Rowe will perform supine to sit and sit to supine independently in 7 days. Mr. Rc Rowe will perform sit to stand and bed to chair with rolling walker independently in 7 days. Mr. Rc Rowe will perform gait 500 ft independently with rolling walker in 7 days. PHYSICAL THERAPY: Daily Note PM   (Link to Caseload Tracking: PT Visit Days : 3  Time In/Out PT Charge Capture  Rehab Caseload Tracker  Orders    Ben Zhao is a 73 y.o. 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 Rc Rowe was supine upon arrival and agreeable to therapy. Patient received dilaudid prior to treatment. Log rolling CGA-min a needing light manual assistance to move LE and verbal cues for sequencing/hand placement. EOB, performed static sitting balance before requesting to lie down due to pain in RLE. NP, Marcelle from ortho aware. Patient reported pain exacerbates with with activity/movement, recovers and absent at rest. Progressing towards goals and will continue PT efforts. SUBJECTIVE:   Mr. Rc Rowe states \"It just hurts\".      Social/Functional Lives With: Alone  Type of Home: House  Home Layout: One level  Bathroom Toilet: Standard  Bathroom Accessibility: Accessible  Receives whichever comes first.    TREATMENT:   TREATMENT:   Therapeutic Activity (10 Minutes): Therapeutic activity included Rolling, Supine to Sit, and Sitting balance  to improve functional Activity tolerance, Balance, Coordination, Mobility, and Strength. TREATMENT GRID:  .    AFTER TREATMENT PRECAUTIONS: Bed, Bed/Chair Locked, Call light within reach, and RN notified    INTERDISCIPLINARY COLLABORATION:  PT/ PTA and SPTA    EDUCATION:      TIME IN/OUT:  Time In: 7756  Time Out: 0915  Minutes: 512 [facility] HUGH Avalos PTA   [place], [place]

## 2022-09-22 NOTE — PROGRESS NOTES
END OF SHIFT SUMMARY:      I/Os:  +/- this shift:   09/22 0701 - 09/22 1900  In: 371 [I.V.:875]  Out: 5209 [IHXEO:9375; Drains:30]    Watch pain control. MRI lumbar spine.      Maggie Herron, RN

## 2022-09-22 NOTE — PLAN OF CARE
Problem: Chronic Conditions and Co-morbidities  Goal: Patient's chronic conditions and co-morbidity symptoms are monitored and maintained or improved  Outcome: Progressing     Problem: Pain  Goal: Verbalizes/displays adequate comfort level or baseline comfort level  9/22/2022 1054 by Melody Carbajal RN  Outcome: Progressing  9/21/2022 2348 by Mercy Dill RN  Outcome: Progressing     Problem: Safety - Adult  Goal: Free from fall injury  9/22/2022 1054 by Melody Carbajal RN  Outcome: Progressing  9/21/2022 2348 by Mercy Dill RN  Outcome: Progressing     Problem: ABCDS Injury Assessment  Goal: Absence of physical injury  Outcome: Progressing

## 2022-09-22 NOTE — PROGRESS NOTES
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 Mildly improved . Numbness right lower leg. Pain behind thigh. Objective:     Vital Signs:    Blood pressure (!) 141/80, pulse 58, temperature 98.4 °F (36.9 °C), temperature source Oral, resp. rate 18, height 6' 5\" (1.956 m), weight 203 lb 2.7 oz (92.2 kg), SpO2 97 %. Temp (24hrs), Av.5 °F (36.9 °C), Min:98.2 °F (36.8 °C), Max:98.8 °F (37.1 °C)      No intake/output data recorded.  1901 -  0700  In: 3077 [P.O.:780; I.V.:2297]  Out: 5910 [Urine:5775; Drains:135]    LAB:    Recent Labs     22  0511   HGB 12.4*       Physical Exam    General:   Alert and oriented. No acute distress  Lungs:  Respirations unlabored. Extremities: No evidence of cyanosis. Calves soft, nontender. Moves both upper and lower extremities. Dressing:  Dry. There is fluctuance under wound. Neuro:  Numbness right LE with 3/5 right EHL. Assessment:      Patient Active Problem List   Diagnosis    Essential hypertension    Slow transit constipation    Spinal stenosis of lumbar region with neurogenic claudication    Spondylolysis of lumbar region    Impacted cerumen of right ear    Chronic frontal sinusitis    Colon cancer screening    S/P laminectomy with spinal fusion    S/P lumbar fusion       Plan:       May have hematoma of wound causing nerve compression. Neurologically intact except for right L5 distribution pain. Continue PT/OT  D/C toradol.  Start steroids        Anticipate Discharge To: HOME once able to progress with PT.        Signed By: Colonel Viviana PA-C

## 2022-09-23 LAB
MM INDURATION, POC: 0 MM (ref 0–5)
PPD, POC: NEGATIVE

## 2022-09-23 PROCEDURE — 6370000000 HC RX 637 (ALT 250 FOR IP): Performed by: FAMILY MEDICINE

## 2022-09-23 PROCEDURE — 97110 THERAPEUTIC EXERCISES: CPT

## 2022-09-23 PROCEDURE — 6370000000 HC RX 637 (ALT 250 FOR IP): Performed by: NURSE PRACTITIONER

## 2022-09-23 PROCEDURE — 6370000000 HC RX 637 (ALT 250 FOR IP): Performed by: ORTHOPAEDIC SURGERY

## 2022-09-23 PROCEDURE — 6360000002 HC RX W HCPCS: Performed by: ORTHOPAEDIC SURGERY

## 2022-09-23 PROCEDURE — 97530 THERAPEUTIC ACTIVITIES: CPT

## 2022-09-23 PROCEDURE — 1100000000 HC RM PRIVATE

## 2022-09-23 RX ORDER — GABAPENTIN 300 MG/1
300 CAPSULE ORAL 3 TIMES DAILY
Status: DISCONTINUED | OUTPATIENT
Start: 2022-09-23 | End: 2022-09-26

## 2022-09-23 RX ORDER — OXYCODONE HYDROCHLORIDE 5 MG/1
10 TABLET ORAL EVERY 6 HOURS PRN
Status: DISCONTINUED | OUTPATIENT
Start: 2022-09-23 | End: 2022-09-27 | Stop reason: HOSPADM

## 2022-09-23 RX ORDER — HYDRALAZINE HYDROCHLORIDE 20 MG/ML
10 INJECTION INTRAMUSCULAR; INTRAVENOUS EVERY 6 HOURS PRN
Status: DISCONTINUED | OUTPATIENT
Start: 2022-09-23 | End: 2022-09-26

## 2022-09-23 RX ORDER — HYDRALAZINE HYDROCHLORIDE 25 MG/1
25 TABLET, FILM COATED ORAL EVERY 12 HOURS SCHEDULED
Status: DISCONTINUED | OUTPATIENT
Start: 2022-09-23 | End: 2022-09-25

## 2022-09-23 RX ORDER — MORPHINE SULFATE 2 MG/ML
2 INJECTION, SOLUTION INTRAMUSCULAR; INTRAVENOUS EVERY 4 HOURS PRN
Status: DISCONTINUED | OUTPATIENT
Start: 2022-09-23 | End: 2022-09-27 | Stop reason: HOSPADM

## 2022-09-23 RX ADMIN — FAMOTIDINE 20 MG: 20 TABLET, FILM COATED ORAL at 19:59

## 2022-09-23 RX ADMIN — HYDROCODONE BITARTRATE AND ACETAMINOPHEN 1 TABLET: 10; 325 TABLET ORAL at 04:38

## 2022-09-23 RX ADMIN — GABAPENTIN 100 MG: 100 CAPSULE ORAL at 08:37

## 2022-09-23 RX ADMIN — GABAPENTIN 300 MG: 300 CAPSULE ORAL at 13:47

## 2022-09-23 RX ADMIN — POLYETHYLENE GLYCOL 3350 34 G: 17 POWDER, FOR SOLUTION ORAL at 15:28

## 2022-09-23 RX ADMIN — FAMOTIDINE 20 MG: 20 TABLET, FILM COATED ORAL at 15:29

## 2022-09-23 RX ADMIN — HYDRALAZINE HYDROCHLORIDE 25 MG: 25 TABLET, FILM COATED ORAL at 08:37

## 2022-09-23 RX ADMIN — HYDRALAZINE HYDROCHLORIDE 25 MG: 25 TABLET, FILM COATED ORAL at 19:59

## 2022-09-23 RX ADMIN — ACETAMINOPHEN 650 MG: 325 TABLET ORAL at 13:47

## 2022-09-23 RX ADMIN — FLUTICASONE PROPIONATE 1 SPRAY: 50 SPRAY, METERED NASAL at 08:37

## 2022-09-23 RX ADMIN — OXYCODONE 10 MG: 5 TABLET ORAL at 18:43

## 2022-09-23 RX ADMIN — ACETAMINOPHEN 650 MG: 325 TABLET ORAL at 08:37

## 2022-09-23 RX ADMIN — OXYCODONE 10 MG: 5 TABLET ORAL at 08:37

## 2022-09-23 RX ADMIN — AMLODIPINE BESYLATE 10 MG: 5 TABLET ORAL at 08:37

## 2022-09-23 RX ADMIN — GABAPENTIN 300 MG: 300 CAPSULE ORAL at 19:59

## 2022-09-23 RX ADMIN — ACETAMINOPHEN 650 MG: 325 TABLET ORAL at 19:58

## 2022-09-23 ASSESSMENT — PAIN SCALES - GENERAL
PAINLEVEL_OUTOF10: 6
PAINLEVEL_OUTOF10: 5
PAINLEVEL_OUTOF10: 7

## 2022-09-23 ASSESSMENT — PAIN DESCRIPTION - FREQUENCY
FREQUENCY: CONTINUOUS
FREQUENCY: CONTINUOUS

## 2022-09-23 ASSESSMENT — PAIN DESCRIPTION - LOCATION
LOCATION: LEG

## 2022-09-23 ASSESSMENT — PAIN DESCRIPTION - ORIENTATION
ORIENTATION: RIGHT
ORIENTATION: RIGHT;LOWER
ORIENTATION: RIGHT

## 2022-09-23 ASSESSMENT — PAIN DESCRIPTION - PAIN TYPE
TYPE: ACUTE PAIN
TYPE: SURGICAL PAIN;ACUTE PAIN

## 2022-09-23 ASSESSMENT — PAIN DESCRIPTION - DESCRIPTORS
DESCRIPTORS: ACHING

## 2022-09-23 ASSESSMENT — PAIN DESCRIPTION - ONSET
ONSET: GRADUAL
ONSET: GRADUAL

## 2022-09-23 NOTE — PROGRESS NOTES
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 MRI of the lumbar spine was completed to rule out hematoma. This was reviewed with Dr. Katerin Montalvo and is reassuring however patient continues to have residual foraminal stenosis. Unfortunately it appears that steroids IV caused some degree of hypertension. .       Objective:     Vital Signs:    Blood pressure (!) 158/74, pulse 81, temperature 98.4 °F (36.9 °C), temperature source Oral, resp. rate 20, height 6' 5\" (1.956 m), weight 203 lb 2.7 oz (92.2 kg), SpO2 99 %. Temp (24hrs), Av.7 °F (37.1 °C), Min:98.4 °F (36.9 °C), Max:99.4 °F (37.4 °C)      701 -  190  In: -   Out: 225 [Urine:225]   190 -  0700  In: 2807 [P.O.:660; I.V.:2147]  Out: 4158 [Urine:2650; Drains:80]    LAB:    No results for input(s): HGB, WBC, PLT in the last 72 hours. Physical Exam    General:   Alert and oriented. No acute distress  Lungs:  Respirations unlabored. Extremities: No evidence of cyanosis. Calves soft, nontender. Moves both upper and lower extremities.    Dressing:  clean, dry, and intact  Neuro:  no deficit      Assessment:      Patient Active Problem List   Diagnosis    Essential hypertension    Slow transit constipation    Spinal stenosis of lumbar region with neurogenic claudication    Spondylolysis of lumbar region    Impacted cerumen of right ear    Chronic frontal sinusitis    Colon cancer screening    S/P laminectomy with spinal fusion    S/P lumbar fusion       Plan:     Continue PT/OT  Discontinue:     Consult: none    Anticipate Discharge To: HOME vs rehab. Will try to adjust the medications and get a better avenue of pain control for him. I suspect that his residual pain is foraminal stenosis. His films look better than they did preoperatively in regards to decompression but there is still residual foraminal stenosis. .  Will trial on neuro modulating medications, I will increase his gabapentin to 300 mg 3 times daily. If we do not get any effect with this we may want to trial Lyrica instead of gabapentin. Hopefully we can get his pain controlled and be able to discharge patient home.      Signed By: SHIRLEY Canchola - CNP

## 2022-09-23 NOTE — CONSULTS
[facility] Hospitalist Consult   Admit Date:  2022  5:20 AM   Name:  Ny Mcmanus   Age:  73 y.o. Sex:  male  :  1955   MRN:  [id number]   Room:      Presenting Complaint: No chief complaint on file. Reason(s) for Admission: Spinal stenosis of lumbar region with neurogenic claudication [M48.062]  Spondylolysis of lumbar region [M43.06]  S/P laminectomy with spinal fusion [Z98.1]  S/P lumbar fusion [Z98.1]     Hospitalists consulted by Hi Villavicencio MD for: HTN    History of Presenting Illness:   Ny Mcmanus is a 73 y.o. male with history of hypertension admitted by Ortho spine for L4-L5 and L5-S1 spondylolisthesis and spinal stenosis s/p laminectomy with  facetectomy and foraminotomy, spinal fusion and placement of segmental spinal instrumentation on . Hospitalist consulted for hypertension management. Patient was started on steroids by orthospine and since then has higher blood pressure. At home on amlodipine 10 mg daily. Patient reports also right lateral leg pain radiating to his knee since procedure. MRI of the lumbar spine was completed to rule out hematoma. MRI spine reassuring but had residual foraminal stenosis. Denies chest pain, palpitation, nausea, vomiting or shortness of breath. No other complaint today. Asking for adjustment of his pain medicine as Dilaudid not working. Review of Systems:  10 systems reviewed and negative except as noted in HPI. Assessment & Plan:     Spinal stenosis of lumbar region with neurogenic claudication:  S/P laminectomy with spinal fusion on   Management as per primary  Pain control, switch to morphine and oxy PRN   PT/OT    Hypertension:  Systolic blood pressure in 180s, likely steroids and pain contributing factor  Pain control  Continue amlodipine  Start hydralazine 25 mg p.o. twice daily  IV hydralazine as needed    GERD:  PPI      Discharge Planning: As per primary    Diet:  ADULT DIET;  Regular  DVT PPx: As per primary  Code status: Full Code    Principal Problem:    Spinal stenosis of lumbar region with neurogenic claudication  Active Problems:    S/P laminectomy with spinal fusion    S/P lumbar fusion    Spondylolysis of lumbar region  Resolved Problems:    * No resolved hospital problems. 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      ORTHOPEDIC SURGERY      left knee    SKIN GRAFT      right hand and lower arm        Social History     Tobacco Use    Smoking status: Every Day     Packs/day: 0.25     Years: 10.00     Pack years: 2.50     Types: Cigarettes    Smokeless tobacco: Never   Substance Use Topics    Alcohol use:  Yes     Alcohol/week: 7.0 standard drinks     Types: 7 Shots of liquor per week      Social History     Substance and Sexual Activity   Drug Use No       Family History   Problem Relation Age of Onset    Diabetes Sister     Stroke Father     Diabetes Brother     Cancer Neg Hx     Diabetes Mother         borderline    Heart Disease Mother         chf    Heart Disease Sister         [place]  over 48          Immunization History   Administered Date(s) Administered    COVID-19, PFIZER PURPLE top, DILUTE for use, (age 15 y+), 30mcg/0.3mL 03/27/2021, 04/23/2021    PPD Test 09/21/2022     No Known Allergies   Current Facility-Administered Medications   Medication Dose Route Frequency    hydrALAZINE (APRESOLINE) tablet 25 mg  25 mg Oral 2 times per day    hydrALAZINE (APRESOLINE) injection 10 mg  10 mg IntraVENous Q6H PRN    morphine injection 2 mg  2 mg IntraVENous Q4H PRN    oxyCODONE (ROXICODONE) immediate release tablet 10 mg  10 mg Oral Q6H PRN    gabapentin (NEURONTIN) capsule 300 mg  300 mg Oral TID    dexamethasone (DECADRON) injection 8 mg  8 mg IntraVENous Q8H    amLODIPine (NORVASC) tablet 10 mg  10 mg Oral Daily    bisacodyl (DULCOLAX) EC tablet 5 mg  5 mg Oral Daily    dicyclomine (BENTYL) capsule 20 mg  20 mg Oral TID PRN    fluticasone (FLONASE) 50 MCG/ACT nasal spray 1 spray  1 spray Nasal Daily    polyethylene glycol (GLYCOLAX) packet 34 g  34 g Oral BID    acetaminophen (TYLENOL) tablet 650 mg  650 mg Oral Q6H    ondansetron (ZOFRAN-ODT) disintegrating tablet 4 mg  4 mg Oral Q8H PRN    Or    ondansetron (ZOFRAN) injection 4 mg  4 mg IntraVENous Q6H PRN    cyclobenzaprine (FLEXERIL) tablet 10 mg  10 mg Oral TID PRN    diphenhydrAMINE (BENADRYL) capsule 25 mg  25 mg Oral Q6H PRN    Or    diphenhydrAMINE (BENADRYL) injection 25 mg  25 mg IntraVENous Q6H PRN    famotidine (PEPCID) tablet 20 mg  20 mg Oral BID    Or    famotidine (PEPCID) 20 mg in sodium chloride (PF) 10 mL injection  20 mg IntraVENous BID       Objective:   Patient Vitals for the past 24 hrs:   Temp Pulse Resp BP SpO2   09/23/22 0940 -- 81 -- (!) 158/74 --   09/23/22 0816 98.4 °F (36.9 °C) 70 20 (!) 165/92 99 %   09/23/22 0345 98.4 °F (36.9 °C) 65 18 (!) 180/88 99 %   09/22/22 2252 98.5 °F (36.9 °C) 80 17 129/88 97 %   09/22/22 2115 -- -- -- (!) 150/87 --   09/22/22 2012 99.4 °F (37.4 °C) (!) 105 17 (!) 153/100 96 %   09/22/22 1523 98.6 °F (37 °C) 78 18 (!) 166/94 96 %   09/22/22 1246 -- -- 18 -- --   09/22/22 1126 98.6 °F (37 °C) 53 18 (!) 150/78 99 %   09/22/22 1108 -- -- 18 -- --       Oxygen Therapy  SpO2: 99 %  Pulse via Oximetry: 101 beats per minute  Pulse Oximeter Device Mode: Continuous  Pulse Oximeter Device Location: Finger  O2 Device: None (Room air)  O2 Flow Rate (L/min): 2 L/min    Estimated body mass index is 24.09

## 2022-09-23 NOTE — PLAN OF CARE
Problem: Chronic Conditions and Co-morbidities  Goal: Patient's chronic conditions and co-morbidity symptoms are monitored and maintained or improved  Outcome: Progressing     Problem: Pain  Goal: Verbalizes/displays adequate comfort level or baseline comfort level  Outcome: Progressing     Problem: Safety - Adult  Goal: Free from fall injury  Outcome: Progressing     Problem: ABCDS Injury Assessment  Goal: Absence of physical injury  Outcome: Progressing

## 2022-09-23 NOTE — PROGRESS NOTES
Balance, Coordination, Mobility, and Strength.     TREATMENT GRID:   Date:  9/23 AM Date:   Date:     Activity/Exercise Parameters Parameters Parameters   Heel slides multiple     SAQ's multiple     Ankle pumps multiple                                     AFTER TREATMENT PRECAUTIONS: Bed, Bed/Chair Locked, Call light within reach, and RN notified    INTERDISCIPLINARY COLLABORATION:  PT/ PTA    EDUCATION:      TIME IN/OUT:  Time In: 1041  Time Out: 1049  Minutes: 8    SADI MELCHOR, PTA

## 2022-09-23 NOTE — CARE COORDINATION
Chart screened by CM and CM met with pt at the bedside to discuss d/c planning. PT/OT currently recommending STR at d/c. Pt states that the surgeon is adjusting his medication which is starting to get his pain under control. He states since the medication adjustment he has been able to sit on the side of the bed and stand with therapy. His goal is to become ambulatory once again and d/c home. Hoping to not d/c to STR if possible. Anticipate pt to remain IP until next week. CM will continue to follow.

## 2022-09-23 NOTE — PROGRESS NOTES
Reviewed notes for new spiritual concerns. Good visit with patient    Calm    Very spiritual    prayer              Will follow as needed.

## 2022-09-23 NOTE — PROGRESS NOTES
END OF SHIFT SUMMARY:    -Hydralazine scheduled and PRN ordered for high BP.  BP came down significantly after PO dose  -Pt received pain medication x2  -Pt was able to sit up on side of bed with PT, still having leg pain when standing  -Pt resting in bed comfortably    I/Os:  09/23 0701 - 09/23 1900  In: 840 [P.O.:840]  Out: 375 [Urine:350; Drains:25]  Occurrences this Shift:  Urine N/A, BM 0, Emesis 0    Report given to oncoming nurse Mary Ann Casas RN

## 2022-09-24 PROCEDURE — 6370000000 HC RX 637 (ALT 250 FOR IP): Performed by: ORTHOPAEDIC SURGERY

## 2022-09-24 PROCEDURE — 97110 THERAPEUTIC EXERCISES: CPT

## 2022-09-24 PROCEDURE — 97530 THERAPEUTIC ACTIVITIES: CPT

## 2022-09-24 PROCEDURE — 6370000000 HC RX 637 (ALT 250 FOR IP): Performed by: NURSE PRACTITIONER

## 2022-09-24 PROCEDURE — 1100000000 HC RM PRIVATE

## 2022-09-24 PROCEDURE — 6370000000 HC RX 637 (ALT 250 FOR IP): Performed by: FAMILY MEDICINE

## 2022-09-24 RX ADMIN — FLUTICASONE PROPIONATE 1 SPRAY: 50 SPRAY, METERED NASAL at 08:29

## 2022-09-24 RX ADMIN — OXYCODONE 10 MG: 5 TABLET ORAL at 18:39

## 2022-09-24 RX ADMIN — FAMOTIDINE 20 MG: 20 TABLET, FILM COATED ORAL at 08:28

## 2022-09-24 RX ADMIN — POLYETHYLENE GLYCOL 3350 17 G: 17 POWDER, FOR SOLUTION ORAL at 20:33

## 2022-09-24 RX ADMIN — GABAPENTIN 300 MG: 300 CAPSULE ORAL at 13:35

## 2022-09-24 RX ADMIN — GABAPENTIN 300 MG: 300 CAPSULE ORAL at 09:00

## 2022-09-24 RX ADMIN — BISACODYL 5 MG: 5 TABLET, COATED ORAL at 08:28

## 2022-09-24 RX ADMIN — GABAPENTIN 300 MG: 300 CAPSULE ORAL at 20:33

## 2022-09-24 RX ADMIN — HYDRALAZINE HYDROCHLORIDE 25 MG: 25 TABLET, FILM COATED ORAL at 20:33

## 2022-09-24 RX ADMIN — HYDRALAZINE HYDROCHLORIDE 25 MG: 25 TABLET, FILM COATED ORAL at 08:26

## 2022-09-24 RX ADMIN — ACETAMINOPHEN 650 MG: 325 TABLET ORAL at 03:30

## 2022-09-24 RX ADMIN — ACETAMINOPHEN 650 MG: 325 TABLET ORAL at 13:35

## 2022-09-24 RX ADMIN — FAMOTIDINE 20 MG: 20 TABLET, FILM COATED ORAL at 20:33

## 2022-09-24 RX ADMIN — OXYCODONE 10 MG: 5 TABLET ORAL at 08:34

## 2022-09-24 RX ADMIN — OXYCODONE 10 MG: 5 TABLET ORAL at 23:36

## 2022-09-24 RX ADMIN — ACETAMINOPHEN 650 MG: 325 TABLET ORAL at 20:33

## 2022-09-24 RX ADMIN — ACETAMINOPHEN 650 MG: 325 TABLET ORAL at 08:26

## 2022-09-24 RX ADMIN — AMLODIPINE BESYLATE 10 MG: 5 TABLET ORAL at 08:28

## 2022-09-24 RX ADMIN — POLYETHYLENE GLYCOL 3350 34 G: 17 POWDER, FOR SOLUTION ORAL at 08:29

## 2022-09-24 RX ADMIN — CYCLOBENZAPRINE 10 MG: 10 TABLET, FILM COATED ORAL at 23:42

## 2022-09-24 ASSESSMENT — PAIN DESCRIPTION - LOCATION
LOCATION: LEG
LOCATION: LEG
LOCATION: BACK
LOCATION: LEG
LOCATION: LEG
LOCATION: BACK
LOCATION: FLANK;LEG

## 2022-09-24 ASSESSMENT — PAIN SCALES - GENERAL
PAINLEVEL_OUTOF10: 0
PAINLEVEL_OUTOF10: 6
PAINLEVEL_OUTOF10: 2
PAINLEVEL_OUTOF10: 6
PAINLEVEL_OUTOF10: 5
PAINLEVEL_OUTOF10: 2
PAINLEVEL_OUTOF10: 0
PAINLEVEL_OUTOF10: 5
PAINLEVEL_OUTOF10: 6
PAINLEVEL_OUTOF10: 3
PAINLEVEL_OUTOF10: 0

## 2022-09-24 ASSESSMENT — PAIN DESCRIPTION - DESCRIPTORS
DESCRIPTORS: SORE
DESCRIPTORS: ACHING;THROBBING;STABBING
DESCRIPTORS: ACHING;DISCOMFORT;THROBBING
DESCRIPTORS: ACHING
DESCRIPTORS: ACHING;STABBING;THROBBING;SORE
DESCRIPTORS: ACHING;SHOOTING;STABBING;THROBBING
DESCRIPTORS: ACHING

## 2022-09-24 ASSESSMENT — PAIN DESCRIPTION - PAIN TYPE
TYPE: ACUTE PAIN
TYPE: SURGICAL PAIN
TYPE: SURGICAL PAIN

## 2022-09-24 ASSESSMENT — PAIN DESCRIPTION - ORIENTATION
ORIENTATION: MID;LOWER
ORIENTATION: MID
ORIENTATION: RIGHT

## 2022-09-24 ASSESSMENT — PAIN DESCRIPTION - FREQUENCY
FREQUENCY: INTERMITTENT
FREQUENCY: CONTINUOUS
FREQUENCY: INTERMITTENT

## 2022-09-24 ASSESSMENT — PAIN - FUNCTIONAL ASSESSMENT

## 2022-09-24 ASSESSMENT — PAIN DESCRIPTION - ONSET
ONSET: SUDDEN
ONSET: ON-GOING
ONSET: ON-GOING

## 2022-09-24 NOTE — PROGRESS NOTES
2022         Post Op day: 5 Days Post-Op   Admit Diagnosis: Spinal stenosis of lumbar region with neurogenic claudication [M48.062]  Spondylolysis of lumbar region [M43.06]  S/P laminectomy with spinal fusion [Z98.1]  S/P lumbar fusion [Z98.1]  LAB:    No results found for this or any previous visit (from the past 24 hour(s)). Vital Signs:    Patient Vitals for the past 8 hrs:   BP Temp Temp src Pulse Resp SpO2   22 1015 (!) 146/76 97.4 °F (36.3 °C) Oral (!) 20 20 97 %   22 0834 -- -- -- -- 18 --   22 0709 (!) 160/86 98 °F (36.7 °C) Oral 54 18 98 %   22 0353 (!) 151/90 98.3 °F (36.8 °C) Oral 58 19 98 %     Temp (24hrs), Av °F (36.7 °C), Min:97.4 °F (36.3 °C), Max:98.3 °F (36.8 °C)    Pain Control:      Subjective: Doing well better this AM with improved pain control, no complaints     Objective:  No Acute Distress, Alert and Oriented, Neurovascular exam is normal       Assessment:   Patient Active Problem List   Diagnosis    Essential hypertension    Slow transit constipation    Spinal stenosis of lumbar region with neurogenic claudication    Spondylolysis of lumbar region    Impacted cerumen of right ear    Chronic frontal sinusitis    Colon cancer screening    S/P laminectomy with spinal fusion    S/P lumbar fusion       Status Post Procedure(s) (LRB):  LUMBAR 4- SACRAL 1  LAMINECTOMY FUSION WITH  TRANSFORAMINAL LUMBAR INTERBODY FUSION POSTERIOR ONE LEVEL (N/A)        Plan: Continue PT/OT. Medical management per hospitalist.  D/c disp:  home once drain out and cleared by PT.    Signed By: YUE Garibay

## 2022-09-24 NOTE — PROGRESS NOTES
ACUTE PHYSICAL THERAPY GOALS:   (Developed with and agreed upon by patient and/or caregiver.)   Mr. Olu Reno will perform supine to sit and sit to supine independently in 7 days. Mr. Olu Reno will perform sit to stand and bed to chair with rolling walker independently in 7 days. Mr. Olu Reno will perform gait 500 ft independently with rolling walker in 7 days. PHYSICAL THERAPY: Daily Note AM   (Link to Caseload Tracking: PT Visit Days : 4  Time In/Out PT Charge Capture  Rehab Caseload Tracker  Orders    Vilinda Goltz is a 73 y.o. male   PRIMARY DIAGNOSIS: Spinal stenosis of lumbar region with neurogenic claudication  Spinal stenosis of lumbar region with neurogenic claudication [M48.062]  Spondylolysis of lumbar region [M43.06]  S/P laminectomy with spinal fusion [Z98.1]  S/P lumbar fusion [Z98.1]  Procedure(s) (LRB):  LUMBAR 4- SACRAL 1  LAMINECTOMY FUSION WITH  TRANSFORAMINAL LUMBAR INTERBODY FUSION POSTERIOR ONE LEVEL (N/A)  5 Days Post-Op  Inpatient: Payor: Sharri Liang / Plan: [address] [facility] HMO / Product Type: Medicare /     ASSESSMENT:     REHAB RECOMMENDATIONS:   Recommendation to date pending progress:  Setting:  Short-term Rehab Wants HHPT    Equipment:    To Be Determined     ASSESSMENT:  Mr. Olu Reno continues to make no progress toward goals. He reports that he has been performing exercises in the bed overnight. He was willing to sit EOB, but only for a few minutes/exercises. He then returned to supine. Good log roll demonstrated with SBA. He declined attempting to stand today, and states he will do it tomorrow. SUBJECTIVE:   Mr. Olu Reno states \"It's that sciatic pain\".      Social/Functional Lives With: Alone  Type of Home: House  Home Layout: One level  Bathroom Toilet: Standard  Bathroom Accessibility: Accessible  Receives Help From: Family, Other (comment) (Per pt he resides alone and family is in [place])  ADL Assistance: Judy Ortega Assistance: Independent  Ambulation Assistance: Needs assistance  Transfer Assistance: Needs assistance  Active : Yes  Mode of Transportation: Car  Occupation: Retired  OBJECTIVE:     PAIN: VITALS / O2: PRECAUTION / Eh Zelaya / Holli Ferraro:   Pre Treatment: 0   Pain at EOB      Post Treatment: 0 Vitals        Oxygen    None    RESTRICTIONS/PRECAUTIONS:        MOBILITY: I Mod I S SBA CGA Min Mod Max Total  NT x2 Comments:   Bed Mobility    Rolling [] [] [] [x] [] [] [] [] [] [] []    Supine to Sit [] [] [] [x] [] [] [] [] [] [] []    Scooting [] [] [] [x] [] [] [] [] [] [] []    Sit to Supine [] [] [] [x] [] [] [] [] [] [] []    Transfers    Sit to Stand [] [] [] [] [] [] [] [] [] [x] []    Bed to Chair [] [] [] [] [] [] [] [] [] [x] []    Stand to Sit [] [] [] [] [] [] [] [] [] [x] []     [] [] [] [] [] [] [] [] [] [] []    I=Independent, Mod I=Modified Independent, S=Supervision, SBA=Standby Assistance, CGA=Contact Guard Assistance,   Min=Minimal Assistance, Mod=Moderate Assistance, Max=Maximal Assistance, Total=Total Assistance, NT=Not Tested    BALANCE: Good Fair+ Fair Fair- Poor NT Comments   Sitting Static [] [x] [] [] [] []    Sitting Dynamic [] [x] [] [] [] []              Standing Static [] [] [] [] [] [x]    Standing Dynamic [] [] [] [] [] [x]      GAIT: I Mod I S SBA CGA Min Mod Max Total  NT x2 Comments:   Level of Assistance [] [] [] [] [] [] [] [] [] [x] []    Distance   feet    DME N/A    Gait Quality N/A    Weightbearing Status      Stairs      I=Independent, Mod I=Modified Independent, S=Supervision, SBA=Standby Assistance, CGA=Contact Guard Assistance,   Min=Minimal Assistance, Mod=Moderate Assistance, Max=Maximal Assistance, Total=Total Assistance, NT=Not Tested    PLAN:   FREQUENCY AND DURATION: BID for duration of hospital stay or until stated goals are met, whichever comes first.    TREATMENT:   TREATMENT:   Therapeutic Activity (14 Minutes):  Therapeutic activity included Rolling, Supine to Sit, Sit to Supine, Scooting, and Sitting balance  to improve functional Activity tolerance, Balance, Coordination, Mobility, and Strength. Therapeutic Exercise (10 Minutes): Therapeutic exercises noted below to improve functional activity tolerance, AROM, and strength.      TREATMENT GRID:   Date:  9/23 AM Date:  9/24 Date:     Activity/Exercise Parameters Parameters Parameters   Heel slides multiple x20    SAQ's multiple     Ankle pumps multiple x20    SLR  x10    LAQ  x5                        AFTER TREATMENT PRECAUTIONS: Bed, Bed/Chair Locked, Call light within reach, and RN notified    INTERDISCIPLINARY COLLABORATION:  PT/ PTA    EDUCATION:      TIME IN/OUT:  Time In: 0910  Time Out: 2901  Minutes: [address] Johana Nicholas PTA

## 2022-09-24 NOTE — PROGRESS NOTES
Hospitalist Progress Note   Admit Date:  2022  5:20 AM   Name:  Weston Connor   Age:  73 y.o. Sex:  male  :  1955   MRN:  [id number]   Room:      Reason(s) for Admission: Spinal stenosis of lumbar region with neurogenic claudication [M48.062]  Spondylolysis of lumbar region [M43.06]  S/P laminectomy with spinal fusion [Z98.1]  S/P lumbar fusion [Z98.1]     Hospital Course & Interval History:   Mr. Carolann Jordan is a nice 72 y/o AAM with a h/o HTN, L4-L5 and L5-S1 spondylolisthesis and spinal stenosis who was admitted to the Ortho Spine service on . He underwent s/p laminectomy with  facetectomy and foraminotomy, spinal fusion and placement of segmental spinal instrumentation. Developed pain post-operative and repeat MRI felt reassuring aside from residual foraminal stenosis. At one point he was on IV steroids. He then developed hypertension so we were consulted on  for assistance. Subjective/24hr Events (22): Feels well, got up to edge of bed and stood up yesterday but didn't take any steps. Able to move his RLE better this morning. Pain controlled. No chest pain or SOB. Assessment & Plan:   # HTN   - Sounds controlled at home, SBP typically 130s-140s. Here a little higher, remains only on his amlodipine 10mg. Oral hydralazine was added, will follow trend, if does indeed need a second agent would favor something that is once daily. # Lumbar spinal stenosis    - S/p surgery on , per primary. # GERD   - PPI    Discharge Planning: Per primary. Diet:  ADULT DIET;  Regular  DVT PPx: SCD  Code status: Full Code    Hospital Problems             Last Modified POA    * (Principal) Spinal stenosis of lumbar region with neurogenic claudication 2022 Yes    Overview Signed 2022  4:29 PM by Graham Park MA     Added automatically from request for surgery [id number]         S/P laminectomy with spinal fusion 2022 Yes    S/P lumbar fusion 2022 Yes Spondylolysis of lumbar region 9/19/2022 Yes    Overview Signed 8/22/2022  4:29 PM by Foreign Jacob MA     Added automatically from request for surgery [id number]            Objective:   Patient Vitals for the past 24 hrs:   Temp Pulse Resp BP SpO2   09/24/22 0834 -- -- 18 -- --   09/24/22 0709 98 °F (36.7 °C) 54 18 (!) 160/86 98 %   09/24/22 0353 98.3 °F (36.8 °C) 58 19 (!) 151/90 98 %   09/23/22 2322 98.1 °F (36.7 °C) 61 16 127/78 99 %   09/23/22 1958 98 °F (36.7 °C) 70 18 138/86 97 %   09/23/22 1523 98.2 °F (36.8 °C) 66 18 137/74 97 %   09/23/22 1053 98.2 °F (36.8 °C) 68 18 136/76 97 %   09/23/22 0940 -- 81 -- (!) 158/74 --       Estimated body mass index is 24.09 kg/m² as calculated from the following:    Height as of this encounter: 6' 5\" (1.956 m). Weight as of this encounter: 203 lb 2.7 oz (92.2 kg). Intake/Output Summary (Last 24 hours) at 9/24/2022 0926  Last data filed at 9/24/2022 0908  Gross per 24 hour   Intake 1170 ml   Output 900 ml   Net 270 ml         Physical Exam:   Blood pressure (!) 160/86, pulse 54, temperature 98 °F (36.7 °C), temperature source Oral, resp. rate 18, height 6' 5\" (1.956 m), weight 203 lb 2.7 oz (92.2 kg), SpO2 98 %. General:    Well nourished. No overt distress. Head:  Normocephalic, atraumatic  Eyes:  Sclerae appear normal. Pupils equally round. ENT:  Nares appear normal, no drainage. Moist oral mucosa  Neck:  No restricted ROM. Trachea midline. CV:   RRR. No m/r/g. No jugular venous distension. Lungs:   CTAB. No wheezing, rhonchi, or rales. Respirations even, unlabored. Abdomen: Bowel sounds present. Soft, nontender, nondistended. Extremities: No cyanosis or clubbing. No edema. Skin:     No rashes and normal coloration. Warm and dry. Neuro:  CN II-XII grossly intact. Sensation intact. A&Ox3  Psych:  Normal mood and affect.       I have reviewed ordered lab tests and independently visualized imaging below:    Recent Labs:  Recent Results (from the past 48 hour(s))   PLEASE READ & DOCUMENT PPD TEST IN 24 HRS    Collection Time: 09/22/22  3:02 PM   Result Value Ref Range    PPD, (POC) Negative Negative    mm Induration 0.0 0 - 5 mm   PLEASE READ & DOCUMENT PPD TEST IN 48 HRS    Collection Time: 09/23/22 12:00 AM   Result Value Ref Range    PPD, (POC) Negative Negative    mm Induration 0 0 - 5 mm         Other Studies:  MRI LUMBAR SPINE W WO CONTRAST    Result Date: 9/23/2022  EXAM: MRI OF THE LUMBAR SPINE WITHOUT AND WITH IV CONTRAST CONTRAST INDICATION: Postop hematoma, right pain, numbness COMPARISON: MRI 7/22/2022 and x-ray 9/19/2022 TECHNIQUE: MRI sequences of the lumbar spine were obtained before and after administration of 18 mL ProHance contrast IV. FINDINGS: Postsurgical changes of L4-S1 laminectomy and posterior fusion. There is a 3.4 x 1.0 x 2.1 cm T1 hypo-, T2 hyperintense fluid collection in the laminectomy bed and epidural space which does not demonstrate significant associated enhancement. Marrow signal is diffusely heterogeneous. Modic type degenerative signal changes of the L4 and L5 paired endplates. L1-2: No significant spinal canal stenosis or neuroforaminal narrowing. L2-3:     Disc bulge, epidural lipomatosis, and facet arthrosis result in mild narrowing of the bilateral neural foramina and moderate central canal stenosis. L3-4:     Shallow disc bulge, epidural lipomatosis, and facet arthrosis resulting in mild to moderate narrowing of the central spinal canal and mild bilateral neuroforaminal narrowing. L4-5:     Circumferential disc bulge and above described fluid collection within the laminectomy bed and epidural space resulting in severe narrowing of the central spinal canal and right neural foramen. There is also moderate narrowing of the left neural foramen.  L5-S1:  Circumferential disc bulge and above-described fluid collection within the laminectomy bed and epidural space resulting in severe narrowing of the central spinal canal and moderate to severe narrowing of the bilateral neural foramina. 3.4 x 1.0 x 2.1 cm T1 hypo-, T2 hyperintense fluid collection within the laminectomy bed and epidural space. Differential considerations include hematoma and seroma. No appreciable surrounding enhancement to suggest presence of an abscess. Current Meds:  Current Facility-Administered Medications   Medication Dose Route Frequency    hydrALAZINE (APRESOLINE) tablet 25 mg  25 mg Oral 2 times per day    hydrALAZINE (APRESOLINE) injection 10 mg  10 mg IntraVENous Q6H PRN    morphine injection 2 mg  2 mg IntraVENous Q4H PRN    oxyCODONE (ROXICODONE) immediate release tablet 10 mg  10 mg Oral Q6H PRN    gabapentin (NEURONTIN) capsule 300 mg  300 mg Oral TID    amLODIPine (NORVASC) tablet 10 mg  10 mg Oral Daily    bisacodyl (DULCOLAX) EC tablet 5 mg  5 mg Oral Daily    dicyclomine (BENTYL) capsule 20 mg  20 mg Oral TID PRN    fluticasone (FLONASE) 50 MCG/ACT nasal spray 1 spray  1 spray Nasal Daily    polyethylene glycol (GLYCOLAX) packet 34 g  34 g Oral BID    acetaminophen (TYLENOL) tablet 650 mg  650 mg Oral Q6H    ondansetron (ZOFRAN-ODT) disintegrating tablet 4 mg  4 mg Oral Q8H PRN    Or    ondansetron (ZOFRAN) injection 4 mg  4 mg IntraVENous Q6H PRN    cyclobenzaprine (FLEXERIL) tablet 10 mg  10 mg Oral TID PRN    diphenhydrAMINE (BENADRYL) capsule 25 mg  25 mg Oral Q6H PRN    Or    diphenhydrAMINE (BENADRYL) injection 25 mg  25 mg IntraVENous Q6H PRN    famotidine (PEPCID) tablet 20 mg  20 mg Oral BID    Or    famotidine (PEPCID) 20 mg in sodium chloride (PF) 10 mL injection  20 mg IntraVENous BID       Signed:  Gerda Epley, MD    Part of this note may have been written by using a voice dictation software. The note has been proof read but may still contain some grammatical/other typographical errors.

## 2022-09-25 PROCEDURE — 6370000000 HC RX 637 (ALT 250 FOR IP): Performed by: NURSE PRACTITIONER

## 2022-09-25 PROCEDURE — 6370000000 HC RX 637 (ALT 250 FOR IP): Performed by: INTERNAL MEDICINE

## 2022-09-25 PROCEDURE — 97530 THERAPEUTIC ACTIVITIES: CPT

## 2022-09-25 PROCEDURE — 6370000000 HC RX 637 (ALT 250 FOR IP): Performed by: FAMILY MEDICINE

## 2022-09-25 PROCEDURE — 6370000000 HC RX 637 (ALT 250 FOR IP): Performed by: ORTHOPAEDIC SURGERY

## 2022-09-25 PROCEDURE — 1100000000 HC RM PRIVATE

## 2022-09-25 RX ORDER — LISINOPRIL 5 MG/1
10 TABLET ORAL DAILY
Status: DISCONTINUED | OUTPATIENT
Start: 2022-09-25 | End: 2022-09-26

## 2022-09-25 RX ADMIN — FAMOTIDINE 20 MG: 20 TABLET, FILM COATED ORAL at 07:35

## 2022-09-25 RX ADMIN — ACETAMINOPHEN 650 MG: 325 TABLET ORAL at 13:14

## 2022-09-25 RX ADMIN — LISINOPRIL 10 MG: 5 TABLET ORAL at 10:02

## 2022-09-25 RX ADMIN — GABAPENTIN 300 MG: 300 CAPSULE ORAL at 20:30

## 2022-09-25 RX ADMIN — ACETAMINOPHEN 650 MG: 325 TABLET ORAL at 20:28

## 2022-09-25 RX ADMIN — GABAPENTIN 300 MG: 300 CAPSULE ORAL at 14:00

## 2022-09-25 RX ADMIN — OXYCODONE 10 MG: 5 TABLET ORAL at 13:14

## 2022-09-25 RX ADMIN — AMLODIPINE BESYLATE 10 MG: 5 TABLET ORAL at 07:34

## 2022-09-25 RX ADMIN — POLYETHYLENE GLYCOL 3350 34 G: 17 POWDER, FOR SOLUTION ORAL at 07:34

## 2022-09-25 RX ADMIN — ACETAMINOPHEN 650 MG: 325 TABLET ORAL at 03:02

## 2022-09-25 RX ADMIN — FAMOTIDINE 20 MG: 20 TABLET, FILM COATED ORAL at 20:29

## 2022-09-25 RX ADMIN — FLUTICASONE PROPIONATE 1 SPRAY: 50 SPRAY, METERED NASAL at 07:56

## 2022-09-25 RX ADMIN — OXYCODONE 10 MG: 5 TABLET ORAL at 07:34

## 2022-09-25 RX ADMIN — OXYCODONE 10 MG: 5 TABLET ORAL at 20:30

## 2022-09-25 RX ADMIN — ACETAMINOPHEN 650 MG: 325 TABLET ORAL at 07:35

## 2022-09-25 RX ADMIN — HYDRALAZINE HYDROCHLORIDE 25 MG: 25 TABLET, FILM COATED ORAL at 07:35

## 2022-09-25 RX ADMIN — POLYETHYLENE GLYCOL 3350 17 G: 17 POWDER, FOR SOLUTION ORAL at 20:31

## 2022-09-25 RX ADMIN — BISACODYL 5 MG: 5 TABLET, COATED ORAL at 07:35

## 2022-09-25 RX ADMIN — GABAPENTIN 300 MG: 300 CAPSULE ORAL at 09:00

## 2022-09-25 ASSESSMENT — PAIN DESCRIPTION - ORIENTATION
ORIENTATION: MID;LOWER
ORIENTATION: MID;LOWER
ORIENTATION: POSTERIOR
ORIENTATION: POSTERIOR

## 2022-09-25 ASSESSMENT — PAIN - FUNCTIONAL ASSESSMENT
PAIN_FUNCTIONAL_ASSESSMENT: PREVENTS OR INTERFERES SOME ACTIVE ACTIVITIES AND ADLS
PAIN_FUNCTIONAL_ASSESSMENT: PREVENTS OR INTERFERES SOME ACTIVE ACTIVITIES AND ADLS
PAIN_FUNCTIONAL_ASSESSMENT: ACTIVITIES ARE NOT PREVENTED
PAIN_FUNCTIONAL_ASSESSMENT: PREVENTS OR INTERFERES SOME ACTIVE ACTIVITIES AND ADLS

## 2022-09-25 ASSESSMENT — PAIN DESCRIPTION - DESCRIPTORS
DESCRIPTORS: ACHING;CRAMPING;DISCOMFORT;THROBBING
DESCRIPTORS: SORE
DESCRIPTORS: SORE

## 2022-09-25 ASSESSMENT — PAIN SCALES - GENERAL
PAINLEVEL_OUTOF10: 0
PAINLEVEL_OUTOF10: 6
PAINLEVEL_OUTOF10: 3
PAINLEVEL_OUTOF10: 0
PAINLEVEL_OUTOF10: 0
PAINLEVEL_OUTOF10: 7
PAINLEVEL_OUTOF10: 0
PAINLEVEL_OUTOF10: 7
PAINLEVEL_OUTOF10: 0
PAINLEVEL_OUTOF10: 0

## 2022-09-25 ASSESSMENT — PAIN DESCRIPTION - LOCATION
LOCATION: BACK

## 2022-09-25 ASSESSMENT — PAIN DESCRIPTION - PAIN TYPE
TYPE: SURGICAL PAIN
TYPE: SURGICAL PAIN

## 2022-09-25 ASSESSMENT — PAIN DESCRIPTION - FREQUENCY
FREQUENCY: INTERMITTENT
FREQUENCY: INTERMITTENT

## 2022-09-25 ASSESSMENT — PAIN DESCRIPTION - ONSET
ONSET: ON-GOING
ONSET: GRADUAL

## 2022-09-25 NOTE — PROGRESS NOTES
EOS notes:    Had x1 Oxy for back pain. Was OOB to [facility] w/ assist.  Corinne Gastelum drain care. No IV SITE;per pt been for a few days;refused re insertion.

## 2022-09-25 NOTE — PROGRESS NOTES
Hospitalist Progress Note   Admit Date:  2022  5:20 AM   Name:  Alejandra Bowers   Age:  73 y.o. 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 Leonor Irvin is a nice 72 y/o AAM with a h/o HTN, L4-L5 and L5-S1 spondylolisthesis and spinal stenosis who was admitted to the Ortho Spine service on . He underwent s/p laminectomy with  facetectomy and foraminotomy, spinal fusion and placement of segmental spinal instrumentation. Developed pain post-operatively and repeat MRI felt reassuring aside from residual foraminal stenosis. At one point he was on IV steroids. He then developed hypertension so we were consulted on  for assistance. Subjective/24hr Events (22):  Doing better, legs feeling stronger, doesn't like the idea that STR has been suggested. BP improved. No chest pain or SOB. Assessment & Plan:   # HTN              - Sounds controlled at home, SBP typically 130s-140s.   - Con't amlodipine, change hydralazine to lisinopril . BMP tomorrow. # Lumbar spinal stenosis               - S/p surgery on , per primary.   - PT/OT     # GERD              - PPI    Discharge Planning: Per primary. Diet:  ADULT DIET;  Regular  DVT PPx: SCD  Code status: Full Code    Hospital Problems             Last Modified POA    * (Principal) Spinal stenosis of lumbar region with neurogenic claudication 2022 Yes    Overview Signed 2022  4:29 PM by Gregorio Mcfarlane MA     Added automatically from request for surgery [id number]         S/P laminectomy with spinal fusion 2022 Yes    S/P lumbar fusion 2022 Yes    Spondylolysis of lumbar region 2022 Yes    Overview Signed 2022  4:29 PM by Gregorio Mcfarlane MA     Added automatically from request for surgery [id number]            Objective:   Patient Vitals for the past 24 hrs:   Temp Pulse Resp BP SpO2   09/25/22 0713 98.4 °F (36.9 °C) 52 20 138/76 98 %   09/25/22 0331 98.8 °F (37.1 °C) 62 18 133/75 98 %   09/24/22 2256 98.6 °F (37 °C) 51 18 136/72 95 %   09/24/22 2033 -- -- -- (!) 150/94 --   09/24/22 1943 97.5 °F (36.4 °C) 82 18 (!) 150/94 97 %   09/24/22 1839 -- -- 18 -- --   09/24/22 1420 98.1 °F (36.7 °C) 70 18 117/70 98 %       Estimated body mass index is 24.09 kg/m² as calculated from the following:    Height as of this encounter: 6' 5\" (1.956 m). Weight as of this encounter: 203 lb 2.7 oz (92.2 kg). Intake/Output Summary (Last 24 hours) at 9/25/2022 1015  Last data filed at 9/25/2022 0902  Gross per 24 hour   Intake 1200 ml   Output 1403 ml   Net -203 ml         Physical Exam:   Blood pressure 138/76, pulse 52, temperature 98.4 °F (36.9 °C), temperature source Oral, resp. rate 20, height 6' 5\" (1.956 m), weight 203 lb 2.7 oz (92.2 kg), SpO2 98 %. General:    Well nourished. No overt distress. Head:  Normocephalic, atraumatic  Eyes:  Sclerae appear normal. Pupils equally round. ENT:  Nares appear normal, no drainage. Moist oral mucosa. Poor dentition. Neck:  No restricted ROM. Trachea midline. CV:   RRR. No m/r/g. No jugular venous distension. Lungs:   CTAB. No wheezing, rhonchi, or rales. Respirations even, unlabored. Abdomen: Bowel sounds present. Soft, nontender, nondistended. Extremities: No cyanosis or clubbing. No edema. Skin:     No rashes and normal coloration. Warm and dry. Neuro:  CN II-XII grossly intact. Sensation intact. A&Ox3. Able to hold legs up against gravity. Psych:  Normal mood and affect. I have reviewed ordered lab tests and independently visualized imaging below:    Recent Labs:  No results found for this or any previous visit (from the past 48 hour(s)). Other Studies:  No results found.     Current Meds:  Current Facility-Administered Medications   Medication Dose Route Frequency    lisinopril (PRINIVIL;ZESTRIL) tablet 10 mg  10 mg Oral Daily    hydrALAZINE (APRESOLINE) injection 10 mg  10 mg IntraVENous Q6H PRN    morphine injection 2 mg  2 mg IntraVENous Q4H PRN    oxyCODONE (ROXICODONE) immediate release tablet 10 mg  10 mg Oral Q6H PRN    gabapentin (NEURONTIN) capsule 300 mg  300 mg Oral TID    amLODIPine (NORVASC) tablet 10 mg  10 mg Oral Daily    bisacodyl (DULCOLAX) EC tablet 5 mg  5 mg Oral Daily    dicyclomine (BENTYL) capsule 20 mg  20 mg Oral TID PRN    fluticasone (FLONASE) 50 MCG/ACT nasal spray 1 spray  1 spray Nasal Daily    polyethylene glycol (GLYCOLAX) packet 34 g  34 g Oral BID    acetaminophen (TYLENOL) tablet 650 mg  650 mg Oral Q6H    ondansetron (ZOFRAN-ODT) disintegrating tablet 4 mg  4 mg Oral Q8H PRN    Or    ondansetron (ZOFRAN) injection 4 mg  4 mg IntraVENous Q6H PRN    cyclobenzaprine (FLEXERIL) tablet 10 mg  10 mg Oral TID PRN    diphenhydrAMINE (BENADRYL) capsule 25 mg  25 mg Oral Q6H PRN    Or    diphenhydrAMINE (BENADRYL) injection 25 mg  25 mg IntraVENous Q6H PRN    famotidine (PEPCID) tablet 20 mg  20 mg Oral BID    Or    famotidine (PEPCID) 20 mg in sodium chloride (PF) 10 mL injection  20 mg IntraVENous BID       Signed:  Wing Jess MD    Part of this note may have been written by using a voice dictation software. The note has been proof read but may still contain some grammatical/other typographical errors.

## 2022-09-25 NOTE — PROGRESS NOTES
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 Vital Signs:    Patient Vitals for the past 8 hrs:   BP Temp Temp src Pulse Resp SpO2   22 0713 138/76 98.4 °F (36.9 °C) Oral 52 20 98 %   22 0331 133/75 98.8 °F (37.1 °C) Oral 62 18 98 %       Temp (24hrs), Av.3 °F (36.8 °C), Min:97.5 °F (36.4 °C), Max:98.8 °F (37.1 °C)    Pain Control:      Subjective: pt reports his sciatica keeps him from being as mobile. Otherwise pain is reasonably controlled. Objective:  No Acute Distress, Alert and Oriented, Neurovascular intact. Dressing intact. Serousanguanous output in drain. Assessment:   Patient Active Problem List   Diagnosis    Essential hypertension    Slow transit constipation    Spinal stenosis of lumbar region with neurogenic claudication    Spondylolysis of lumbar region    Impacted cerumen of right ear    Chronic frontal sinusitis    Colon cancer screening    S/P laminectomy with spinal fusion    S/P lumbar fusion       Status Post Procedure(s) (LRB):  LUMBAR 4- SACRAL 1  LAMINECTOMY FUSION WITH  TRANSFORAMINAL LUMBAR INTERBODY FUSION POSTERIOR ONE LEVEL (N/A)        Plan: Continue PT/OT. Medical management per hospitalist.  D/c disp:  home once cleared by PT. [id number] [address] for nursing to pull drain today.     Signed By: YUE Feliciano

## 2022-09-25 NOTE — PROGRESS NOTES
ACUTE PHYSICAL THERAPY GOALS:   (Developed with and agreed upon by patient and/or caregiver.)   Mr. Rey Vela will perform supine to sit and sit to supine independently in 7 days. Mr. Rey Vela will perform sit to stand and bed to chair with rolling walker independently in 7 days. Mr. Rey Vela will perform gait 500 ft independently with rolling walker in 7 days. PHYSICAL THERAPY: Re Evaluation-Daily Note AM   (Link to Caseload Tracking: PT Visit Days : 1  Time In/Out PT Charge Capture  Rehab Caseload Tracker  Orders    Smooth Smith is a 73 y.o. 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 Rey Vela has been seen by PT since post op day 1. On that day he was complaining on numbness and pain in his right leg. That pain still continues. However back on 9/20 he was ambulatory in the afternoon 200 ft with cg. Today Mr. Rey Vela got to the edge of the bed by himself. Attempted to stand 1 time and then said he couldn't do it. Referring to pain in his right leg \"sciatica\". After a few more minutes on the side of the bed he said he had to lay down and that was it. Mr. Rey Vela became angry with this PT when it was suggested that he needs to either go to rehab or go home. He believes he can stay here til he his rehabbed.   Such talk by me was negative. Goals remain current during this re evaluation session with no progress made.         SUBJECTIVE:   Mr. Olu Reno states \"I have family members who have stayed here for months to rehab so dont tell me what I have to do\"    Social/Functional Lives With: Alone  Type of Home: [address]: One level  Bathroom Toilet: Standard  Bathroom Accessibility: Accessible  Receives Help From: Family, Other (comment) (Per pt he resides alone and family is in [place])  ADL Assistance: Independent  Homemaking Assistance: Independent  Ambulation Assistance: Needs assistance  Transfer Assistance: Needs assistance  Active : Yes  Mode of Transportation: Car  Occupation: Retired  OBJECTIVE:     PAIN: VITALS / O2: PRECAUTION / Cassie Sours / [place]:   Pre Treatment: 0   Pain at EOB      Post Treatment: 0 Vitals        Oxygen    None    RESTRICTIONS/PRECAUTIONS:        MOBILITY: I Mod I S SBA CGA Min Mod Max Total  NT x2 Comments:   Bed Mobility    Rolling [] [] [] [x] [] [] [] [] [] [] []    Supine to Sit [] [] [] [x] [] [] [] [] [] [] []    Scooting [] [] [] [x] [] [] [] [] [] [] []    Sit to Supine [] [] [] [x] [] [] [] [] [] [] []    Transfers    Sit to Stand [] [] [] [x] [] [] [] [] [] [] [] Made it half way up   Bed to Chair [] [] [] [] [] [] [] [] [] [x] []    Stand to Sit [] [] [] [] [] [] [] [] [] [x] []     [] [] [] [] [] [] [] [] [] [] []    I=Independent, Mod I=Modified Independent, S=Supervision, SBA=Standby Assistance, CGA=Contact Guard Assistance,   Min=Minimal Assistance, Mod=Moderate Assistance, Max=Maximal Assistance, Total=Total Assistance, NT=Not Tested    BALANCE: Good Fair+ Fair Fair- Poor NT Comments   Sitting Static [] [x] [] [] [] []    Sitting Dynamic [] [x] [] [] [] []              Standing Static [] [] [] [] [] [x]    Standing Dynamic [] [] [] [] [] [x]      GAIT: I Mod I S SBA CGA Min Mod Max Total  NT x2 Comments:   Level of Assistance [] [] [] [] [] [] [] [] [] [x] []    Distance   feet    DME N/A Gait Quality N/A    Weightbearing Status      Stairs      I=Independent, Mod I=Modified Independent, S=Supervision, SBA=Standby Assistance, CGA=Contact Guard Assistance,   Min=Minimal Assistance, Mod=Moderate Assistance, Max=Maximal Assistance, Total=Total Assistance, NT=Not Tested    PLAN:   FREQUENCY AND DURATION: BID for duration of hospital stay or until stated goals are met, whichever comes first.    TREATMENT:   TREATMENT:   Therapeutic Activity (15 Minutes): Therapeutic activity included Rolling, Supine to Sit, Sit to Supine, Scooting, and Sitting balance  to improve functional Activity tolerance, Balance, Coordination, Mobility, and Strength.     TREATMENT GRID:   Date:  9/23 AM Date:  9/24 Date:     Activity/Exercise Parameters Parameters Parameters   Heel slides multiple x20    SAQ's multiple     Ankle pumps multiple x20    SLR  x10    LAQ  x5                        AFTER TREATMENT PRECAUTIONS: Bed, Bed/Chair Locked, Call light within reach, and RN notified    INTERDISCIPLINARY COLLABORATION:  PT/ PTA    EDUCATION:      TIME IN/OUT:  Time In: 0900  Time Out: 0915  Minutes: [address], PT

## 2022-09-25 NOTE — PROGRESS NOTES
ACUTE PHYSICAL THERAPY GOALS:   (Developed with and agreed upon by patient and/or caregiver.)   Mr. Mauricio Higgins will perform supine to sit and sit to supine independently in 7 days. Mr. Mauricio Higgins will perform sit to stand and bed to chair with rolling walker independently in 7 days. Mr. Mauricio Higgins will perform gait 500 ft independently with rolling walker in 7 days. PHYSICAL THERAPY:Daily Note PM   (Link to Caseload Tracking: PT Visit Days : 1  Time In/Out PT Charge Capture  Rehab Caseload Tracker  Orders    Albania Mar is a 73 y.o. 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 Mauricio Higgins has been seen by PT since post op day 1. On that day he was complaining on numbness and pain in his right leg. That pain still continues. However back on 9/20 he was ambulatory in the afternoon 200 ft with cg. Today Mr. Mauricio Higgins got to the edge of the bed by himself. Attempted to stand 1 time and then said he couldn't do it. Referring to pain in his right leg \"sciatica\". After a few more minutes on the side of the bed he said he had to lay down and that was it. Mr. Mauricio Higgins became angry with this PT when it was suggested that he needs to either go to rehab or go home. He believes he can stay here til he his rehabbed. Such talk by me was negative.   Goals remain current during this re evaluation session with no progress made. PM-  Mr. Landon Shafer was agreeable to get out of bed this afternoon. He performed sit to stand and took steps to chair with walker and cg. About 5-6 steps. Hopefully this is the start of his progress. He said the reason he did it this afternoon was \"timing was everything\".        SUBJECTIVE:   Mr. Landon Shafer states \"I have family members who have stayed here for months to rehab so dont tell me what I have to do\"  \"Timing is everything\"  Social/Functional Lives With: Alone  Type of Home: House  Home Layout: One level  Bathroom Toilet: Standard  Bathroom Accessibility: Accessible  Receives Help From: Family, Other (comment) (Per pt he resides alone and family is in [place])  ADL Assistance: Independent  Homemaking Assistance: Independent  Ambulation Assistance: Needs assistance  Transfer Assistance: Needs assistance  Active : Yes  Mode of Transportation: Car  Occupation: Retired  OBJECTIVE:     PAIN: VITALS / O2: PRECAUTION / Mayme Jump / Arturo Mayo:   Pre Treatment: 0   Pain at EOB      Post Treatment: 0 Vitals        Oxygen    None    RESTRICTIONS/PRECAUTIONS:        MOBILITY: I Mod I S SBA CGA Min Mod Max Total  NT x2 Comments:   Bed Mobility    Rolling [] [] [] [x] [] [] [] [] [] [] []    Supine to Sit [] [] [] [x] [] [] [] [] [] [] []    Scooting [] [] [] [x] [] [] [] [] [] [] []    Sit to Supine [] [] [] [x] [] [] [] [] [] [] []    Transfers    Sit to Stand [] [] [] [] [x] [] [] [] [] [] [] Made it half way up   Bed to Chair [] [] [] [] [x] [] [] [] [] [] []    Stand to Sit [] [] [] [] [x] [] [] [] [] [] []     [] [] [] [] [] [] [] [] [] [] []    I=Independent, Mod I=Modified Independent, S=Supervision, SBA=Standby Assistance, CGA=Contact Guard Assistance,   Min=Minimal Assistance, Mod=Moderate Assistance, Max=Maximal Assistance, Total=Total Assistance, NT=Not Tested    BALANCE: Good Fair+ Fair Fair- Poor NT Comments   Sitting Static [] [x] [] [] [] []    Sitting Dynamic [] [x] [] [] [] []              Standing Static [] [] [] [] [] [x]    Standing Dynamic [] [] [] [] [] [x]      GAIT: I Mod I S SBA CGA Min Mod Max Total  NT x2 Comments:   Level of Assistance [] [] [] [] [] [] [] [] [] [x] []    Distance 3 feet    DME N/A    Gait Quality N/A    Weightbearing Status      Stairs      I=Independent, Mod I=Modified Independent, S=Supervision, SBA=Standby Assistance, CGA=Contact Guard Assistance,   Min=Minimal Assistance, Mod=Moderate Assistance, Max=Maximal Assistance, Total=Total Assistance, NT=Not Tested    PLAN:   FREQUENCY AND DURATION: BID for duration of hospital stay or until stated goals are met, whichever comes first.    TREATMENT:   TREATMENT:   Therapeutic Activity (15 Minutes): Therapeutic activity included Rolling, Supine to Sit, Sit to Supine, Scooting, and Sitting balance  to improve functional Activity tolerance, Balance, Coordination, Mobility, and Strength.     TREATMENT GRID:   Date:  9/23 AM Date:  9/24 Date:     Activity/Exercise Parameters Parameters Parameters   Heel slides multiple x20    SAQ's multiple     Ankle pumps multiple x20    SLR  x10    LAQ  x5                        AFTER TREATMENT PRECAUTIONS: Bed, Bed/Chair Locked, Call light within reach, and RN notified    INTERDISCIPLINARY COLLABORATION:  PT/ PTA    EDUCATION:      TIME IN/OUT:  Time In: 1420  Time Out: [facility]  Minutes: [address], PT

## 2022-09-26 ENCOUNTER — APPOINTMENT (OUTPATIENT)
Dept: INTERVENTIONAL RADIOLOGY/VASCULAR | Age: 67
DRG: 460 | End: 2022-09-26
Attending: ORTHOPAEDIC SURGERY
Payer: MEDICARE

## 2022-09-26 LAB
ANION GAP SERPL CALC-SCNC: 4 MMOL/L (ref 4–13)
BUN SERPL-MCNC: 22 MG/DL (ref 8–23)
CALCIUM SERPL-MCNC: 9.3 MG/DL (ref 8.3–10.4)
CHLORIDE SERPL-SCNC: 106 MMOL/L (ref 101–110)
CO2 SERPL-SCNC: 30 MMOL/L (ref 21–32)
CREAT SERPL-MCNC: 1.1 MG/DL (ref 0.8–1.5)
GLUCOSE SERPL-MCNC: 103 MG/DL (ref 65–100)
POTASSIUM SERPL-SCNC: 4.1 MMOL/L (ref 3.5–5.1)
SODIUM SERPL-SCNC: 140 MMOL/L (ref 138–145)

## 2022-09-26 PROCEDURE — 3E0T3BZ INTRODUCTION OF ANESTHETIC AGENT INTO PERIPHERAL NERVES AND PLEXI, PERCUTANEOUS APPROACH: ICD-10-PCS | Performed by: RADIOLOGY

## 2022-09-26 PROCEDURE — 36415 COLL VENOUS BLD VENIPUNCTURE: CPT

## 2022-09-26 PROCEDURE — 6370000000 HC RX 637 (ALT 250 FOR IP): Performed by: FAMILY MEDICINE

## 2022-09-26 PROCEDURE — 1100000000 HC RM PRIVATE

## 2022-09-26 PROCEDURE — 6370000000 HC RX 637 (ALT 250 FOR IP): Performed by: NURSE PRACTITIONER

## 2022-09-26 PROCEDURE — 64493 INJ PARAVERT F JNT L/S 1 LEV: CPT

## 2022-09-26 PROCEDURE — 77003 FLUOROGUIDE FOR SPINE INJECT: CPT

## 2022-09-26 PROCEDURE — 2500000003 HC RX 250 WO HCPCS: Performed by: RADIOLOGY

## 2022-09-26 PROCEDURE — 6370000000 HC RX 637 (ALT 250 FOR IP): Performed by: INTERNAL MEDICINE

## 2022-09-26 PROCEDURE — 97530 THERAPEUTIC ACTIVITIES: CPT

## 2022-09-26 PROCEDURE — 6370000000 HC RX 637 (ALT 250 FOR IP): Performed by: RADIOLOGY

## 2022-09-26 PROCEDURE — 3E0T33Z INTRODUCTION OF ANTI-INFLAMMATORY INTO PERIPHERAL NERVES AND PLEXI, PERCUTANEOUS APPROACH: ICD-10-PCS | Performed by: RADIOLOGY

## 2022-09-26 PROCEDURE — 80048 BASIC METABOLIC PNL TOTAL CA: CPT

## 2022-09-26 PROCEDURE — 6370000000 HC RX 637 (ALT 250 FOR IP): Performed by: ORTHOPAEDIC SURGERY

## 2022-09-26 PROCEDURE — 6360000004 HC RX CONTRAST MEDICATION: Performed by: PHYSICIAN ASSISTANT

## 2022-09-26 RX ORDER — LIDOCAINE HYDROCHLORIDE 20 MG/ML
INJECTION, SOLUTION INFILTRATION; PERINEURAL
Status: COMPLETED | OUTPATIENT
Start: 2022-09-26 | End: 2022-09-26

## 2022-09-26 RX ORDER — OXYCODONE HYDROCHLORIDE 5 MG/1
TABLET ORAL
Status: COMPLETED | OUTPATIENT
Start: 2022-09-26 | End: 2022-09-26

## 2022-09-26 RX ORDER — GABAPENTIN 300 MG/1
600 CAPSULE ORAL 3 TIMES DAILY
Status: DISCONTINUED | OUTPATIENT
Start: 2022-09-26 | End: 2022-09-27 | Stop reason: HOSPADM

## 2022-09-26 RX ORDER — HYDRALAZINE HYDROCHLORIDE 20 MG/ML
10 INJECTION INTRAMUSCULAR; INTRAVENOUS EVERY 6 HOURS PRN
Status: DISCONTINUED | OUTPATIENT
Start: 2022-09-26 | End: 2022-09-27 | Stop reason: HOSPADM

## 2022-09-26 RX ORDER — LISINOPRIL 20 MG/1
20 TABLET ORAL DAILY
Status: DISCONTINUED | OUTPATIENT
Start: 2022-09-27 | End: 2022-09-27 | Stop reason: HOSPADM

## 2022-09-26 RX ADMIN — ACETAMINOPHEN 650 MG: 325 TABLET ORAL at 14:43

## 2022-09-26 RX ADMIN — FAMOTIDINE 20 MG: 20 TABLET, FILM COATED ORAL at 20:13

## 2022-09-26 RX ADMIN — IOPAMIDOL 3 ML: 408 INJECTION, SOLUTION INTRATHECAL at 11:52

## 2022-09-26 RX ADMIN — LIDOCAINE HYDROCHLORIDE 40 MG: 20 INJECTION, SOLUTION INFILTRATION; PERINEURAL at 13:51

## 2022-09-26 RX ADMIN — POLYETHYLENE GLYCOL 3350 17 G: 17 POWDER, FOR SOLUTION ORAL at 20:12

## 2022-09-26 RX ADMIN — BISACODYL 5 MG: 5 TABLET, COATED ORAL at 08:18

## 2022-09-26 RX ADMIN — GABAPENTIN 600 MG: 300 CAPSULE ORAL at 08:24

## 2022-09-26 RX ADMIN — OXYCODONE 10 MG: 5 TABLET ORAL at 13:25

## 2022-09-26 RX ADMIN — ACETAMINOPHEN 650 MG: 325 TABLET ORAL at 20:14

## 2022-09-26 RX ADMIN — POLYETHYLENE GLYCOL 3350 17 G: 17 POWDER, FOR SOLUTION ORAL at 08:20

## 2022-09-26 RX ADMIN — GABAPENTIN 600 MG: 300 CAPSULE ORAL at 14:45

## 2022-09-26 RX ADMIN — AMLODIPINE BESYLATE 10 MG: 5 TABLET ORAL at 08:19

## 2022-09-26 RX ADMIN — ACETAMINOPHEN 650 MG: 325 TABLET ORAL at 08:19

## 2022-09-26 RX ADMIN — FLUTICASONE PROPIONATE 1 SPRAY: 50 SPRAY, METERED NASAL at 08:20

## 2022-09-26 RX ADMIN — FAMOTIDINE 20 MG: 20 TABLET, FILM COATED ORAL at 08:19

## 2022-09-26 RX ADMIN — ACETAMINOPHEN 650 MG: 325 TABLET ORAL at 01:14

## 2022-09-26 RX ADMIN — LIDOCAINE HYDROCHLORIDE 100 MG: 20 INJECTION, SOLUTION INFILTRATION; PERINEURAL at 13:59

## 2022-09-26 RX ADMIN — GABAPENTIN 600 MG: 300 CAPSULE ORAL at 20:30

## 2022-09-26 RX ADMIN — OXYCODONE 10 MG: 5 TABLET ORAL at 08:24

## 2022-09-26 RX ADMIN — LISINOPRIL 10 MG: 5 TABLET ORAL at 08:19

## 2022-09-26 RX ADMIN — CYCLOBENZAPRINE 10 MG: 10 TABLET, FILM COATED ORAL at 05:00

## 2022-09-26 ASSESSMENT — PAIN DESCRIPTION - PAIN TYPE
TYPE: SURGICAL PAIN
TYPE: SURGICAL PAIN

## 2022-09-26 ASSESSMENT — PAIN SCALES - GENERAL
PAINLEVEL_OUTOF10: 7
PAINLEVEL_OUTOF10: 0
PAINLEVEL_OUTOF10: 0
PAINLEVEL_OUTOF10: 4
PAINLEVEL_OUTOF10: 2
PAINLEVEL_OUTOF10: 0
PAINLEVEL_OUTOF10: 0
PAINLEVEL_OUTOF10: 3

## 2022-09-26 ASSESSMENT — PAIN DESCRIPTION - LOCATION
LOCATION: BACK

## 2022-09-26 ASSESSMENT — PAIN DESCRIPTION - DESCRIPTORS
DESCRIPTORS: THROBBING
DESCRIPTORS: BURNING;SORE
DESCRIPTORS: ACHING;THROBBING

## 2022-09-26 ASSESSMENT — PAIN - FUNCTIONAL ASSESSMENT
PAIN_FUNCTIONAL_ASSESSMENT: PREVENTS OR INTERFERES SOME ACTIVE ACTIVITIES AND ADLS
PAIN_FUNCTIONAL_ASSESSMENT: PREVENTS OR INTERFERES SOME ACTIVE ACTIVITIES AND ADLS
PAIN_FUNCTIONAL_ASSESSMENT: 0-10
PAIN_FUNCTIONAL_ASSESSMENT: PREVENTS OR INTERFERES SOME ACTIVE ACTIVITIES AND ADLS

## 2022-09-26 ASSESSMENT — PAIN DESCRIPTION - ONSET
ONSET: ON-GOING
ONSET: AWAKENED FROM SLEEP

## 2022-09-26 ASSESSMENT — PAIN DESCRIPTION - FREQUENCY
FREQUENCY: INTERMITTENT
FREQUENCY: INTERMITTENT

## 2022-09-26 ASSESSMENT — PAIN DESCRIPTION - ORIENTATION
ORIENTATION: MID
ORIENTATION: MID
ORIENTATION: LOWER

## 2022-09-26 NOTE — PROGRESS NOTES
ACUTE PHYSICAL THERAPY GOALS:   (Developed with and agreed upon by patient and/or caregiver.)   Mr. Rey Vela will perform supine to sit and sit to supine independently in 7 days. Mr. Rey Vela will perform sit to stand and bed to chair with rolling walker independently in 7 days. Mr. Rey Vela will perform gait 500 ft independently with rolling walker in 7 days. PHYSICAL THERAPY:Daily Note AM   (Link to Caseload Tracking: PT Visit Days : 2  Time In/Out PT Charge Capture  Rehab Caseload Tracker  Orders    Smooth Smith is a 73 y.o. 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 Rey Vela has been seen by PT since post op day 1. On that day he was complaining on numbness and pain in his right leg. That pain still continues. However back on 9/20 he was ambulatory in the afternoon 200 ft with cg.        9/26/22  AM   Mr. Rey Vela was agreeable to work with therapy. He still has complaints of R LE pain. He log rolled to get to EOB. He performed sit to stand and took steps to chair with walker and CGA 5'. He sat and rested and then decided he wanted to get back to bed. Attempted sit to stand from low chair with VC's for hand and feet placement but was unable to come to full standing even with max a.  Assistance came to room and pt was still unable to stand with assist x 2. He was refusing to use a RW at this point and couldn't get out of chair. Arm of chair lowered and pt was able to scoot himself over to the bed with min a. SUBJECTIVE:   Mr. Gordo Ulrich states \"Come back at 1:30 and I'll get my pain medicine at 1:00\". Social/Functional Lives With: Alone  Type of Home: House  Home Layout: One level  Bathroom Toilet: Standard  Bathroom Accessibility: Accessible  Receives Help From: Family, Other (comment) (Per pt he resides alone and family is in [place])  ADL Assistance: Independent  Homemaking Assistance: Independent  Ambulation Assistance: Needs assistance  Transfer Assistance: Needs assistance  Active : Yes  Mode of Transportation: Car  Occupation: Retired  OBJECTIVE:     PAIN: VITALS / O2: PRECAUTION / Carylon Cornea / Butch Dunk:   Pre Treatment: 0    R LE pain with movement and st bearing.       Post Treatment: 0 Vitals        Oxygen    None    RESTRICTIONS/PRECAUTIONS:        MOBILITY: I Mod I S SBA CGA Min Mod Max Total  NT x2 Comments:   Bed Mobility    Rolling [] [] [] [x] [] [] [] [] [] [] []    Supine to Sit [] [] [] [x] [] [] [] [] [] [] []    Scooting [] [] [] [x] [] [] [] [] [] [] []    Sit to Supine [] [] [] [x] [] [] [] [] [] [] []    Transfers    Sit to Stand [] [] [] [] [] [x] [] [] [] [] [] Made it half way up   Bed to Chair [] [] [] [] [x] [] [] [] [] [] []    Stand to Sit [] [] [] [] [] [x] [] [] [] [] []     [] [] [] [] [] [] [] [] [] [] []    I=Independent, Mod I=Modified Independent, S=Supervision, SBA=Standby Assistance, CGA=Contact Guard Assistance,   Min=Minimal Assistance, Mod=Moderate Assistance, Max=Maximal Assistance, Total=Total Assistance, NT=Not Tested    BALANCE: Good Fair+ Fair Fair- Poor NT Comments   Sitting Static [] [x] [] [] [] []    Sitting Dynamic [] [x] [] [] [] []              Standing Static [] [] [x] [] [] []    Standing Dynamic [] [] [x] [] [] []      GAIT: I Mod I S SBA CGA Min Mod Max Total  NT x2 Comments:   Level of Assistance [] [] [] [] [] [] [] [] [] [x] []    Distance 5 feet    DME Rolling Walker    Gait Quality Decreased step clearance and Decreased step length    Weightbearing Status      Stairs      I=Independent, Mod I=Modified Independent, S=Supervision, SBA=Standby Assistance, CGA=Contact Guard Assistance,   Min=Minimal Assistance, Mod=Moderate Assistance, Max=Maximal Assistance, Total=Total Assistance, NT=Not Tested    PLAN:   FREQUENCY AND DURATION: BID for duration of hospital stay or until stated goals are met, whichever comes first.    TREATMENT:   TREATMENT:   Therapeutic Activity (38 Minutes): Therapeutic activity included Rolling, Supine to Sit, Sit to Supine, Scooting, Ambulation on level ground, Sitting balance , and Standing balance to improve functional Activity tolerance, Balance, Coordination, Mobility, and Strength.     TREATMENT GRID:   Date:  9/23 AM Date:  9/24 Date:     Activity/Exercise Parameters Parameters Parameters   Heel slides multiple x20    SAQ's multiple     Ankle pumps multiple x20    SLR  x10    LAQ  x5                        AFTER TREATMENT PRECAUTIONS: Bed, Bed/Chair Locked, Call light within reach, and RN notified    INTERDISCIPLINARY COLLABORATION:  RN/ PCT and PT/ PTA    EDUCATION:      TIME IN/OUT:  Time In: 0910  Time Out: 1970  Minutes: [address] PTA

## 2022-09-26 NOTE — PROGRESS NOTES
EOS notes:    Able to get OOB to [facility] w/ assist;x1 Oxy for pain overnight. Dressing on back changed this AM;well approximated incision;no signs of infection. BP on the high side;monitored.

## 2022-09-26 NOTE — BRIEF OP NOTE
Department of Interventional Radiology  [phone]        Interventional Radiology Brief Procedure Note    Patient: Ana Elmore MRN: [id number]  SSN: [id number]    YOB: 1955  Age: 73 y.o. Sex: male      Date of Procedure: 9/26/2022    Pre-Procedure Diagnosis: R L5 radicular pain. Post-Procedure Diagnosis: SAME    Procedure(s):  R L5 Nerve Root Block    Brief Description of Procedure: The nerve root could not be identified due to distortion of normal tissue planes. Lidocaine and Depomedrol were administered in the expected location of the NR. Performed By: Mario Cash MD     Assistants: None    Anesthesia:Lidocaine    Estimated Blood Loss: None    Specimens:  None    Implants:  None    Findings: The nerve root could not be identified due to distortion of normal tissue planes. Complications: None    Recommendations: Continue current therapy. Follow Up: PRN.      Signed By: Mario Cash MD     September 26, 2022

## 2022-09-26 NOTE — OR NURSING
TRANSFER - OUT REPORT:           Verbal report given to Florinda Wallace RN on Shania Media  being transferred to room 518 for routine progression of patient care              Report consisted of patients Situation, Background, Assessment and      Recommendations(SBAR). Information from the following report(s) SBAR, Procedure Summary and MAR was reviewed with the receiving nurse. Opportunity for questions and clarification was provided. Pt tolerated procedure well.          VITALS:  BP (!) 185/85   Pulse 80   Temp 98.1 °F (36.7 °C) (Oral)   Resp 18   Ht 6' 5\" (1.956 m)   Wt 203 lb (92.1 kg)   SpO2 98%   BMI 24.07 kg/m²

## 2022-09-26 NOTE — PROGRESS NOTES
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 Better on gabapentin but still not ambulating. MRI revealed residual foraminal stenosis. . No side effects from gabapentin       Objective:     Vital Signs:    Blood pressure (!) 182/91, pulse 75, temperature 97.9 °F (36.6 °C), temperature source Oral, resp. rate 18, height 6' 5\" (1.956 m), weight 203 lb 2.7 oz (92.2 kg), SpO2 99 %. Temp (24hrs), Av.5 °F (36.9 °C), Min:97.7 °F (36.5 °C), Max:99.3 °F (37.4 °C)      No intake/output data recorded.  1901 -  0700  In: 1800 [P.O.:1800]  Out: 2175 [Urine:2075; Drains:100]    LAB:    No results for input(s): HGB, WBC, PLT in the last 72 hours. Physical Exam    General:   Alert and oriented. No acute distress  Lungs:  Respirations unlabored. Extremities: No evidence of cyanosis. Calves soft, nontender. Moves both upper and lower extremities. Dressing:  clean, dry, and intact  Neuro:  Right EHL weakness      Assessment:      Patient Active Problem List   Diagnosis    Essential hypertension    Slow transit constipation    Spinal stenosis of lumbar region with neurogenic claudication    Spondylolysis of lumbar region    Impacted cerumen of right ear    Chronic frontal sinusitis    Colon cancer screening    S/P laminectomy with spinal fusion    S/P lumbar fusion       Plan:     Continue PT/OT  Discontinue:     Consult: Interventional Radiology for right L5 TFESI today. Will increase gabapentin. Discussed with patient needs to mobilize. He does not want to go to rehab.      Anticipate Discharge To: HOME with [facility], hopefully tomorrow pending status after injection        Signed By: SHIRLEY Mancini - CNP

## 2022-09-26 NOTE — PROGRESS NOTES
Hospitalist Progress Note   Admit Date:  2022  5:20 AM   Name:  Zev Arora   Age:  73 y.o. 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 Anny Goldstein is a nice 72 y/o AAM with a h/o HTN, L4-L5 and L5-S1 spondylolisthesis and spinal stenosis who was admitted to the Ortho Spine service on . He underwent s/p laminectomy with  facetectomy and foraminotomy, spinal fusion and placement of segmental spinal instrumentation. Developed pain post-operatively and repeat MRI felt reassuring aside from residual foraminal stenosis. At one point he was on IV steroids. He then developed hypertension so we were consulted on  for assistance. Subjective/24hr Events (22):  SBP up some past 24 hours. Still with ongoing RLE pain. No back pain. Hopeful to get epidural steroid injection today. No chest pain, SOB, N/V/D. Assessment & Plan:   # HTN              - Sounds controlled at home, SBP typically 130s-140s.              - Con't amlodipine, lisinopril, follow trend today and adjust as needed. # Lumbar spinal stenosis               - S/p surgery on , per primary.              - PT/OT. Epidural steroid injection . # GERD              - PPI    Discharge Planning:  Per primary. Does not want rehab. Diet:  ADULT DIET;  Regular  DVT PPx: SCD  Code status: Full Code    Hospital Problems             Last Modified POA    * (Principal) Spinal stenosis of lumbar region with neurogenic claudication 2022 Yes    Overview Signed 2022  4:29 PM by Lemuel Deluca MA     Added automatically from request for surgery [id number]         S/P laminectomy with spinal fusion 2022 Yes    S/P lumbar fusion 2022 Yes Spondylolysis of lumbar region 9/19/2022 Yes    Overview Signed 8/22/2022  4:29 PM by Alma Rosa Peralta MA     Added automatically from request for surgery [id number]            Objective:   Patient Vitals for the past 24 hrs:   Temp Pulse Resp BP SpO2   09/26/22 0745 97.9 °F (36.6 °C) 75 18 (!) 182/91 99 %   09/26/22 0501 -- 53 -- (!) 141/77 --   09/26/22 0355 98.9 °F (37.2 °C) 59 20 (!) 171/90 99 %   09/26/22 0115 -- 65 -- 120/86 --   09/25/22 2256 99.1 °F (37.3 °C) 57 18 (!) 170/81 100 %   09/25/22 1927 99.3 °F (37.4 °C) 59 18 (!) 147/76 98 %   09/25/22 1450 97.7 °F (36.5 °C) 93 22 (!) 143/88 97 %   09/25/22 1051 98.3 °F (36.8 °C) 72 18 126/84 99 %       Estimated body mass index is 24.09 kg/m² as calculated from the following:    Height as of this encounter: 6' 5\" (1.956 m). Weight as of this encounter: 203 lb 2.7 oz (92.2 kg). Intake/Output Summary (Last 24 hours) at 9/26/2022 4183  Last data filed at 9/26/2022 0911  Gross per 24 hour   Intake 720 ml   Output 1985 ml   Net -1265 ml         Physical Exam:   Blood pressure (!) 182/91, pulse 75, temperature 97.9 °F (36.6 °C), temperature source Oral, resp. rate 18, height 6' 5\" (1.956 m), weight 203 lb 2.7 oz (92.2 kg), SpO2 99 %. General:    Well nourished, thin. No overt distress. Pleasant, conversant. Head:  Normocephalic, atraumatic  Eyes:  Sclerae appear normal. Pupils equally round. ENT:  Nares appear normal, no drainage. Moist oral mucosa. Poor dentition. Neck:  No restricted ROM. Trachea midline. CV:   RRR. No m/r/g. No jugular venous distension. Lungs:   CTAB. No wheezing, rhonchi, or rales. Respirations even, unlabored. Abdomen: Bowel sounds present. Soft, nontender, nondistended. Extremities: No cyanosis or clubbing. No edema. Skin:     No rashes and normal coloration. Warm and dry. Neuro:  CN II-XII grossly intact. Sensation intact. A&Ox3. Moves all extremities, can hold legs against gravity. Psych:  Normal mood and affect.       I have reviewed ordered lab tests and independently visualized imaging below:    Recent Labs:  Recent Results (from the past 48 hour(s))   Basic Metabolic Panel w/ Reflex to MG    Collection Time: 09/26/22  6:12 AM   Result Value Ref Range    Sodium 140 138 - 145 mmol/L    Potassium 4.1 3.5 - 5.1 mmol/L    Chloride 106 101 - 110 mmol/L    CO2 30 21 - 32 mmol/L    Anion Gap 4 4 - 13 mmol/L    Glucose 103 (H) 65 - 100 mg/dL    BUN 22 8 - 23 MG/DL    Creatinine 1.10 0.8 - 1.5 MG/DL    GFR African American >60 >60 ml/min/1.73m2    GFR Non- >60 >60 ml/min/1.73m2    Calcium 9.3 8.3 - 10.4 MG/DL         Other Studies:  No results found.     Current Meds:  Current Facility-Administered Medications   Medication Dose Route Frequency    gabapentin (NEURONTIN) capsule 600 mg  600 mg Oral TID    lisinopril (PRINIVIL;ZESTRIL) tablet 10 mg  10 mg Oral Daily    hydrALAZINE (APRESOLINE) injection 10 mg  10 mg IntraVENous Q6H PRN    morphine injection 2 mg  2 mg IntraVENous Q4H PRN    oxyCODONE (ROXICODONE) immediate release tablet 10 mg  10 mg Oral Q6H PRN    amLODIPine (NORVASC) tablet 10 mg  10 mg Oral Daily    bisacodyl (DULCOLAX) EC tablet 5 mg  5 mg Oral Daily    dicyclomine (BENTYL) capsule 20 mg  20 mg Oral TID PRN    fluticasone (FLONASE) 50 MCG/ACT nasal spray 1 spray  1 spray Nasal Daily    polyethylene glycol (GLYCOLAX) packet 34 g  34 g Oral BID    acetaminophen (TYLENOL) tablet 650 mg  650 mg Oral Q6H    ondansetron (ZOFRAN-ODT) disintegrating tablet 4 mg  4 mg Oral Q8H PRN    Or    ondansetron (ZOFRAN) injection 4 mg  4 mg IntraVENous Q6H PRN    cyclobenzaprine (FLEXERIL) tablet 10 mg  10 mg Oral TID PRN    diphenhydrAMINE (BENADRYL) capsule 25 mg  25 mg Oral Q6H PRN    Or    diphenhydrAMINE (BENADRYL) injection 25 mg  25 mg IntraVENous Q6H PRN    famotidine (PEPCID) tablet 20 mg  20 mg Oral BID    Or    famotidine (PEPCID) 20 mg in sodium chloride (PF) 10 mL injection  20 mg IntraVENous BID Signed:  Fernanda Wilson MD    Part of this note may have been written by using a voice dictation software. The note has been proof read but may still contain some grammatical/other typographical errors.

## 2022-09-26 NOTE — PROGRESS NOTES
Physical therapy note: Attempted PT this PM. Pt gone to IR on first attempt. Later, saw pt returning to room on a stretcher. He declined assistance to get off stretcher and amb to bed and declined just transfer to the bed. Instead he scooted off the stretcher to the bed. Declined PT at this time. Will continue to treat as pt is able and time/schedule permit.     Cecilia Jay, PTA    Rehab Caseload Tracker

## 2022-09-27 ENCOUNTER — HOME HEALTH ADMISSION (OUTPATIENT)
Dept: HOME HEALTH SERVICES | Facility: HOME HEALTH | Age: 67
End: 2022-09-27
Payer: MEDICARE

## 2022-09-27 VITALS
DIASTOLIC BLOOD PRESSURE: 81 MMHG | HEART RATE: 73 BPM | BODY MASS INDEX: 25.41 KG/M2 | RESPIRATION RATE: 18 BRPM | OXYGEN SATURATION: 96 % | HEIGHT: 77 IN | SYSTOLIC BLOOD PRESSURE: 155 MMHG | TEMPERATURE: 98.4 F | WEIGHT: 215.2 LBS

## 2022-09-27 PROCEDURE — 6370000000 HC RX 637 (ALT 250 FOR IP): Performed by: INTERNAL MEDICINE

## 2022-09-27 PROCEDURE — 97530 THERAPEUTIC ACTIVITIES: CPT

## 2022-09-27 PROCEDURE — 6370000000 HC RX 637 (ALT 250 FOR IP): Performed by: ORTHOPAEDIC SURGERY

## 2022-09-27 PROCEDURE — 6370000000 HC RX 637 (ALT 250 FOR IP): Performed by: FAMILY MEDICINE

## 2022-09-27 PROCEDURE — 6370000000 HC RX 637 (ALT 250 FOR IP): Performed by: NURSE PRACTITIONER

## 2022-09-27 RX ORDER — GABAPENTIN 300 MG/1
600 CAPSULE ORAL 3 TIMES DAILY
Qty: 90 CAPSULE | Refills: 3 | Status: SHIPPED | OUTPATIENT
Start: 2022-09-27 | End: 2022-10-27

## 2022-09-27 RX ORDER — OXYCODONE HYDROCHLORIDE 10 MG/1
10 TABLET ORAL EVERY 6 HOURS PRN
Qty: 28 TABLET | Refills: 0 | Status: SHIPPED | OUTPATIENT
Start: 2022-09-27 | End: 2022-10-04

## 2022-09-27 RX ORDER — LISINOPRIL 20 MG/1
20 TABLET ORAL DAILY
Qty: 30 TABLET | Refills: 0 | Status: SHIPPED | OUTPATIENT
Start: 2022-09-27 | End: 2022-10-27

## 2022-09-27 RX ORDER — OXYCODONE HYDROCHLORIDE 10 MG/1
10 TABLET ORAL EVERY 6 HOURS PRN
Qty: 28 TABLET | Refills: 0 | Status: SHIPPED | OUTPATIENT
Start: 2022-09-27 | End: 2022-09-27 | Stop reason: SDUPTHER

## 2022-09-27 RX ADMIN — ACETAMINOPHEN 650 MG: 325 TABLET ORAL at 13:07

## 2022-09-27 RX ADMIN — GABAPENTIN 600 MG: 300 CAPSULE ORAL at 08:57

## 2022-09-27 RX ADMIN — ACETAMINOPHEN 650 MG: 325 TABLET ORAL at 08:50

## 2022-09-27 RX ADMIN — AMLODIPINE BESYLATE 10 MG: 5 TABLET ORAL at 08:57

## 2022-09-27 RX ADMIN — FLUTICASONE PROPIONATE 1 SPRAY: 50 SPRAY, METERED NASAL at 08:59

## 2022-09-27 RX ADMIN — POLYETHYLENE GLYCOL 3350 34 G: 17 POWDER, FOR SOLUTION ORAL at 08:58

## 2022-09-27 RX ADMIN — OXYCODONE 10 MG: 5 TABLET ORAL at 13:07

## 2022-09-27 RX ADMIN — LISINOPRIL 20 MG: 20 TABLET ORAL at 08:57

## 2022-09-27 RX ADMIN — OXYCODONE 5 MG: 5 TABLET ORAL at 06:43

## 2022-09-27 RX ADMIN — OXYCODONE 5 MG: 5 TABLET ORAL at 03:12

## 2022-09-27 RX ADMIN — GABAPENTIN 600 MG: 300 CAPSULE ORAL at 14:00

## 2022-09-27 RX ADMIN — BISACODYL 5 MG: 5 TABLET, COATED ORAL at 08:57

## 2022-09-27 RX ADMIN — ACETAMINOPHEN 650 MG: 325 TABLET ORAL at 02:36

## 2022-09-27 RX ADMIN — CYCLOBENZAPRINE 10 MG: 10 TABLET, FILM COATED ORAL at 07:16

## 2022-09-27 RX ADMIN — FAMOTIDINE 20 MG: 20 TABLET, FILM COATED ORAL at 08:57

## 2022-09-27 ASSESSMENT — PAIN DESCRIPTION - LOCATION
LOCATION: BACK
LOCATION: LEG
LOCATION: BACK

## 2022-09-27 ASSESSMENT — PAIN SCALES - GENERAL
PAINLEVEL_OUTOF10: 3
PAINLEVEL_OUTOF10: 4
PAINLEVEL_OUTOF10: 0
PAINLEVEL_OUTOF10: 0
PAINLEVEL_OUTOF10: 6
PAINLEVEL_OUTOF10: 6
PAINLEVEL_OUTOF10: 0
PAINLEVEL_OUTOF10: 4
PAINLEVEL_OUTOF10: 0
PAINLEVEL_OUTOF10: 3

## 2022-09-27 ASSESSMENT — PAIN DESCRIPTION - DIRECTION
RADIATING_TOWARDS: RLE
RADIATING_TOWARDS: RLE

## 2022-09-27 ASSESSMENT — PAIN DESCRIPTION - FREQUENCY
FREQUENCY: INTERMITTENT

## 2022-09-27 ASSESSMENT — PAIN DESCRIPTION - DESCRIPTORS
DESCRIPTORS: THROBBING

## 2022-09-27 ASSESSMENT — PAIN DESCRIPTION - ORIENTATION
ORIENTATION: POSTERIOR
ORIENTATION: LOWER;MID
ORIENTATION: LOWER;MID
ORIENTATION: RIGHT
ORIENTATION: POSTERIOR

## 2022-09-27 ASSESSMENT — PAIN DESCRIPTION - PAIN TYPE
TYPE: ACUTE PAIN
TYPE: SURGICAL PAIN
TYPE: SURGICAL PAIN
TYPE: CHRONIC PAIN

## 2022-09-27 ASSESSMENT — PAIN - FUNCTIONAL ASSESSMENT
PAIN_FUNCTIONAL_ASSESSMENT: PREVENTS OR INTERFERES SOME ACTIVE ACTIVITIES AND ADLS
PAIN_FUNCTIONAL_ASSESSMENT: ACTIVITIES ARE NOT PREVENTED
PAIN_FUNCTIONAL_ASSESSMENT: ACTIVITIES ARE NOT PREVENTED

## 2022-09-27 ASSESSMENT — PAIN DESCRIPTION - ONSET
ONSET: ON-GOING
ONSET: AWAKENED FROM SLEEP

## 2022-09-27 NOTE — CARE COORDINATION
Pt to d/c home today. Family will provide transportation. PT/OT recommend STR at d/c but pt refuses and states \"now that I can walk that's all I need. \"  Pt agreeable to [facility]: SN, PT, OT. Rolling walker and 3:1 ordered through [employer] and delivered to pt's room prior to d/c. No other supportive care needs identified. Pt agrees with d/c plan. Milestones met. Pt to f/u with PCP and specialists as OP.       09/20/22 1008   Service Assessment   Patient Orientation Alert and Oriented;Person;Place; Self   Cognition Alert   History Provided By Medical Record; Patient   Primary Caregiver Self   Support Systems Spouse/Significant Other;Family Members   Patient's Healthcare Decision Maker is: Legal Next of Francisco 70   PCP Verified by CM Yes  Perry Greene MD)   Last Visit to PCP Within last 3 months   Prior Functional Level Independent in ADLs/IADLs   Current Functional Level Independent in ADLs/IADLs   Can patient return to prior living arrangement Yes   Ability to make needs known: Good   Family able to assist with home care needs: Yes   Would you like for me to discuss the discharge plan with any other family members/significant others, and if so, who? No   Financial Resources Other (Comment)  ([employer] Medicare)   Social/Functional History   Lives With Alone   Type of [address] One level   Bathroom Toilet Standard   Bathroom Accessibility Accessible   Receives Help From Family; Other (comment)  (Per pt he resides alone and family is in [place])   ADL Assistance Independent   Homemaking Assistance Independent   Ambulation Assistance Needs assistance   Transfer Assistance Needs assistance   Active  Yes   Mode of Transportation Car   Occupation Retired   Discharge Planning   Type of [address] Prior To Admission None   [address]  (PT recommends HH and a rolling walker)   DME Ordered?  Bedside commode;Walker   Potential Assistance Purchasing Medications No   Type of Home Care Services OT;PT;Nursing Services   Patient expects to be discharged to: House   One/Two Story Residence One story   History of falls? 1   Services At/After Discharge   Transition of Care Consult (CM Consult) Discharge Planning;Home Health   Internal [address] Discharge Home Health;DME   [facility] Resource Information Provided? No   Mode of Transport at Discharge Other (see comment)  (Family)   Confirm Follow Up Transport Family   Condition of Participation: Discharge Planning   The Plan for Transition of Care is related to the following treatment goals: Pt requires home health to return to functional baseline in the community. The Patient and/or Patient Representative was provided with a Choice of Provider? Patient   The Patient and/Or Patient Representative agree with the Discharge Plan? Yes   Freedom of Choice list was provided with basic dialogue that supports the patient's individualized plan of care/goals, treatment preferences, and shares the quality data associated with the providers?   Yes

## 2022-09-27 NOTE — PROGRESS NOTES
ACUTE PHYSICAL THERAPY GOALS:   (Developed with and agreed upon by patient and/or caregiver.)   Mr. Mitra Romo will perform supine to sit and sit to supine independently in 7 days. Mr. Mitra Romo will perform sit to stand and bed to chair with rolling walker independently in 7 days. Mr. Mitra Romo will perform gait 500 ft independently with rolling walker in 7 days. PHYSICAL THERAPY:Daily Note AM   (Link to Caseload Tracking: PT Visit Days : 3  Time In/Out PT Charge Capture  Rehab Caseload Tracker  Orders    Radha Giraldo is a 73 y.o. 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 Mitra Romo has been seen by PT since post op day 1. On that day he was complaining on numbness and pain in his right leg. That pain still continues. However back on 9/20 he was ambulatory in the afternoon 200 ft with cg.      9/27/22  AM   Mr. Mitra Romo was agreeable to work with therapy, ready to try walking in the [place]. He got to side of bed with SBA,  He donned brace with set up, stood and ambulated in [place] using rolling walker and CGA/min assist.  He was getting quite fatigued at end of ambulation where his legs were getting weak and he sat down on side of bed. Initially at beginning of treatment he was going to sit up in the chair.   After ambulation he declined sitting up in chair due to fatigue and wanting to stretch his legs out. He stated he had just made his mind up to walk today. Also declined LE exercises as he stated he was too tired. He was left supine with needs in reach. He is making progress towards goals. Will continue with POC. SUBJECTIVE:   Mr. Mitra Romo states \"I want to go home\". Social/Functional Lives With: Alone  Type of Home: House  Home Layout: One level  Bathroom Toilet: Standard  Bathroom Accessibility: Accessible  Receives Help From: Family, Other (comment) (Per pt he resides alone and [place] is in [place])  ADL Assistance: Independent  Homemaking Assistance: Independent  Ambulation Assistance: Needs assistance  Transfer Assistance: Needs assistance  Active : Yes  Mode of Transportation: Car  Occupation: Retired  OBJECTIVE:     PAIN: VITALS / O2: PRECAUTION / Daniel Tom / Fauzia Jewell:   Pre Treatment: 0    R LE pain with movement and with bearing.       Post Treatment: 0 Vitals        Oxygen    None    RESTRICTIONS/PRECAUTIONS:        MOBILITY: I Mod I S SBA CGA Min Mod Max Total  NT x2 Comments:   Bed Mobility    Rolling [] [] [] [x] [] [] [] [] [] [] []    Supine to Sit [] [] [] [x] [] [] [] [] [] [] []    Scooting [] [] [] [x] [] [] [] [] [] [] []    Sit to Supine [] [] [] [x] [] [] [] [] [] [] []    Transfers    Sit to Stand [] [] [] [] [] [x] [] [] [] [] []    Bed to Chair [] [] [] [] [x] [] [] [] [] [] []    Stand to Sit [] [] [] [] [] [x] [] [] [] [] []     [] [] [] [] [] [] [] [] [] [] []    I=Independent, Mod I=Modified Independent, S=Supervision, SBA=Standby Assistance, CGA=Contact Guard Assistance,   Min=Minimal Assistance, Mod=Moderate Assistance, Max=Maximal Assistance, Total=Total Assistance, NT=Not Tested    BALANCE: Good Fair+ Fair Fair- Poor NT Comments   Sitting Static [] [x] [] [] [] []    Sitting Dynamic [] [x] [] [] [] []              Standing Static [] [] [x] [] [] []    Standing Dynamic [] [] [x] [] [] []      GAIT: I Mod I S SBA CGA Min Mod Max Total  NT x2 Comments:   Level of Assistance [] [] [] [] [x] [x] [] [] [] [x] []    Distance 80 feet    DME Rolling Walker    Gait Quality Decreased step clearance and Decreased step length    Weightbearing Status      Stairs      I=Independent, Mod I=Modified Independent, S=Supervision, SBA=Standby Assistance, CGA=Contact Guard Assistance,   Min=Minimal Assistance, Mod=Moderate Assistance, Max=Maximal Assistance, Total=Total Assistance, NT=Not Tested    PLAN:   FREQUENCY AND DURATION: BID for duration of hospital stay or until stated goals are met, whichever comes first.    TREATMENT:   TREATMENT:   Therapeutic Activity (15 Minutes): Therapeutic activity included Rolling, Supine to Sit, Sit to Supine, Scooting, Ambulation on level ground, Sitting balance , and Standing balance to improve functional Activity tolerance, Balance, Coordination, Mobility, and Strength.     TREATMENT GRID:   Date:  9/23 AM Date:  9/24 Date:     Activity/Exercise Parameters Parameters Parameters   Heel slides multiple x20    SAQ's multiple     Ankle pumps multiple x20    SLR  x10    LAQ  x5                        AFTER TREATMENT PRECAUTIONS: Bed, Bed/Chair Locked, Call light within reach, and RN notified    INTERDISCIPLINARY COLLABORATION:  RN/ PCT and PT/ PTA    EDUCATION:      TIME IN/OUT:  Time In: 1005  Time Out: 1020  Minutes: 15    MIGUELINA KUMARI PTA

## 2022-09-27 NOTE — PROGRESS NOTES
Hospitalist Progress Note   Admit Date:  2022  5:20 AM   Name:  Ramses Peraza   Age:  73 y.o. 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 Mike Harper is a nice 74 y/o AAM with a h/o HTN, L4-L5 and L5-S1 spondylolisthesis and spinal stenosis who was admitted to the Ortho Spine service on . He underwent s/p laminectomy with  facetectomy and foraminotomy, spinal fusion and placement of segmental spinal instrumentation. Developed pain post-operatively and repeat MRI felt reassuring aside from residual foraminal stenosis. At one point he was on IV steroids. He then developed hypertension so we were consulted on  for assistance. S/p epidural steroid injection with IR on . Subjective/24hr Events (22): Much better today, still some pain but was able to walk the halls with a RW and therapy supervision. BP up. Good appetite. No chest pain or SOB. Assessment & Plan:   # HTN              - Controlled at home, SBP typically 130s-140s. - Remains on amlodipine at home dose, added lisinopril and increased to 20mg on . # Lumbar spinal stenosis               - S/p surgery on , per primary.              - PT/OT. Epidural steroid injection . # GERD              - PPI    Discharge Planning: Per primary. Diet:  ADULT DIET;  Regular  DVT PPx: SCD  Code status: Full Code    Hospital Problems             Last Modified POA    * (Principal) Spinal stenosis of lumbar region with neurogenic claudication 2022 Yes    Overview Signed 2022  4:29 PM by Nubia Hatfield MA     Added automatically from request for surgery [id number]         S/P laminectomy with spinal fusion 2022 Yes    S/P lumbar fusion 9/21/2022 Yes    Spondylolysis of lumbar region 9/19/2022 Yes    Overview Signed 8/22/2022  4:29 PM by Bertram Nicole MA     Added automatically from request for surgery [id number]            Objective:   Patient Vitals for the past 24 hrs:   Temp Pulse Resp BP SpO2   09/27/22 0815 98.1 °F (36.7 °C) 63 18 (!) 164/95 100 %   09/27/22 0400 -- 60 -- (!) 146/92 --   09/27/22 0303 97.9 °F (36.6 °C) 56 18 (!) 173/80 98 %   09/26/22 2244 98.8 °F (37.1 °C) 57 18 (!) 166/84 100 %   09/26/22 2108 -- 64 -- (!) 145/82 --   09/26/22 1939 98.2 °F (36.8 °C) 77 18 (!) 179/86 96 %   09/26/22 1515 98.5 °F (36.9 °C) 75 18 (!) 155/85 97 %   09/26/22 1058 98.1 °F (36.7 °C) 80 18 (!) 185/85 98 %       Estimated body mass index is 25.52 kg/m² as calculated from the following:    Height as of this encounter: 6' 5\" (1.956 m). Weight as of this encounter: 215 lb 3.2 oz (97.6 kg). Intake/Output Summary (Last 24 hours) at 9/27/2022 1050  Last data filed at 9/27/2022 0300  Gross per 24 hour   Intake 960 ml   Output 1875 ml   Net -915 ml         Physical Exam:   Blood pressure (!) 164/95, pulse 63, temperature 98.1 °F (36.7 °C), resp. rate 18, height 6' 5\" (1.956 m), weight 215 lb 3.2 oz (97.6 kg), SpO2 100 %. General:    Well nourished. No overt distress. Head:  Normocephalic, atraumatic  Eyes:  Sclerae appear normal. Pupils equally round. ENT:  Nares appear normal, no drainage. Moist oral mucosa  Neck:  No restricted ROM. Trachea midline. CV:   RRR. No m/r/g. No jugular venous distension. Lungs:   CTAB. No wheezing, rhonchi, or rales. Respirations even, unlabored. Abdomen: Bowel sounds present. Soft, nontender, nondistended. Extremities: No cyanosis or clubbing. No edema. Moves all extremities. Skin:     No rashes and normal coloration. Warm and dry. Neuro:  CN II-XII grossly intact. Sensation intact. A&Ox3  Psych:  Normal mood and affect.       I have reviewed ordered lab tests and independently visualized imaging below:    Recent Labs:  Recent Results (from the past 48 hour(s))   Basic Metabolic Panel w/ Reflex to MG    Collection Time: 09/26/22  6:12 AM   Result Value Ref Range    Sodium 140 138 - 145 mmol/L    Potassium 4.1 3.5 - 5.1 mmol/L    Chloride 106 101 - 110 mmol/L    CO2 30 21 - 32 mmol/L    Anion Gap 4 4 - 13 mmol/L    Glucose 103 (H) 65 - 100 mg/dL    BUN 22 8 - 23 MG/DL    Creatinine 1.10 0.8 - 1.5 MG/DL    GFR African American >60 >60 ml/min/1.73m2    GFR Non- >60 >60 ml/min/1.73m2    Calcium 9.3 8.3 - 10.4 MG/DL         Other Studies:  IR FLUOROSCOPY GUIDED SPINAL INJECTION    Result Date: 9/26/2022  Title: Epidural steroid/injection. History: 73year old man with right L5 radicular pain following recent multilevel laminectomy and posterior fusion. Consent: Informed written and oral consent was obtained from the patient after explanation of benefits and risks (Including, but not limited to: Infection, Hemorrhage, Spinal Headache, and Nerve Injury). The patient's questions were answered to their satisfaction. He requested that we proceed. Procedure:  Sterile barrier technique (including:  cap, mask, sterile gloves, sterile sheet, hand hygiene, and chlorhexidene for cutaneous antisepsis) were used. With the patient prone, the skin of the lower back was prepped and draped in the standard sterile fashion. Lidocaine was administered for local field block. Using fluoroscopy, the 22 gauge needle was directed towards the nerve root using bony landmarks as a guide. Multiple small contrast injections were made, but the nerve root was never identified. Contrast spread in an amorphous distribution due to disruption of the normal tissue planes. In spite of multiple attempts, the nerve root could not be identified. Therefore, using bony landmarks as a guide, 80 mg of Depo-Medrol followed by 3 ml of Lidocaine was administered. The needle was removed and dressing applied. Findings:   The nerve root could not be identified. Radiation Exposure Indices: Reference Air Kerma (Ka,r) = 183 mGy Dose Area Product/Kerma Area Product (DAP/CLAUDIO/PKA) = 1437.90  cGy-cm2 Fluoroscopy Exposure Time = 3.4 minutes Fluorographic Images = 7 Contrast:  3 milliliters Isovue M 300 administered into the soft tissues of the right L4-L5 neural foramina. Attempted right L5 nerve root block. Plan: The patient has been returned to his hospital room in stable condition. Current Meds:  Current Facility-Administered Medications   Medication Dose Route Frequency    gabapentin (NEURONTIN) capsule 600 mg  600 mg Oral TID    hydrALAZINE (APRESOLINE) injection 10 mg  10 mg IntraVENous Q6H PRN    lisinopril (PRINIVIL;ZESTRIL) tablet 20 mg  20 mg Oral Daily    morphine injection 2 mg  2 mg IntraVENous Q4H PRN    oxyCODONE (ROXICODONE) immediate release tablet 10 mg  10 mg Oral Q6H PRN    amLODIPine (NORVASC) tablet 10 mg  10 mg Oral Daily    bisacodyl (DULCOLAX) EC tablet 5 mg  5 mg Oral Daily    dicyclomine (BENTYL) capsule 20 mg  20 mg Oral TID PRN    fluticasone (FLONASE) 50 MCG/ACT nasal spray 1 spray  1 spray Nasal Daily    polyethylene glycol (GLYCOLAX) packet 34 g  34 g Oral BID    acetaminophen (TYLENOL) tablet 650 mg  650 mg Oral Q6H    ondansetron (ZOFRAN-ODT) disintegrating tablet 4 mg  4 mg Oral Q8H PRN    Or    ondansetron (ZOFRAN) injection 4 mg  4 mg IntraVENous Q6H PRN    cyclobenzaprine (FLEXERIL) tablet 10 mg  10 mg Oral TID PRN    diphenhydrAMINE (BENADRYL) capsule 25 mg  25 mg Oral Q6H PRN    Or    diphenhydrAMINE (BENADRYL) injection 25 mg  25 mg IntraVENous Q6H PRN    famotidine (PEPCID) tablet 20 mg  20 mg Oral BID    Or    famotidine (PEPCID) 20 mg in sodium chloride (PF) 10 mL injection  20 mg IntraVENous BID       Signed:  Deya Hannah MD    Part of this note may have been written by using a voice dictation software.   The note has been proof read but may still contain some grammatical/other typographical errors.

## 2022-09-27 NOTE — PROGRESS NOTES
Briefed by staff. No spiritual needs voiced. Chaplains remain available for support as needed.      Juan Meza 69  Board Certified

## 2022-09-27 NOTE — PROGRESS NOTES
Patient walked with walker up and down hallway and is progressing well. Patient was able to get up to the chair on his own.

## 2022-09-27 NOTE — PROGRESS NOTES
EOS notes:    BP been elevated;monitored/rechecked;does not meet parameter for prn IV BP med. No IV site;refused. Had Oxyir x2 but  wanted only 1/2 dose as pain mild each time. Dressing on back dry/intact.

## 2022-09-27 NOTE — CARE COORDINATION
Chart screened by CM and pt discussed during IDR. CM met with pt at the bedside to discuss d/c planning as well. PT/OT continue to recommend STR at d/c. On 9/26 pt underwent a R L5 nerve root block. Both Pre and Post-Procedure Dx: R L5 radicular pain. Pt maintains that he has been able to ambulate in the hallway with therapy. He states he does not want to go to STR, even though this is still recommended by PT/OT. Pt states he has called his nephew to come pick him up today. Pt is agreeable to [facility], a rolling walker, and a 3:1.  DELORES tried to explain to pt that a provider has not placed a d/c order but he replied \"my nephew isn't working today so I called him to come get me. \"  DELORES spoke with the charge nurse and she has reached out to ortho. CM will continue to follow.

## 2022-09-29 ENCOUNTER — TELEPHONE (OUTPATIENT)
Dept: ORTHOPEDIC SURGERY | Age: 67
End: 2022-09-29

## 2022-09-29 ENCOUNTER — TELEPHONE (OUTPATIENT)
Dept: FAMILY MEDICINE CLINIC | Facility: CLINIC | Age: 67
End: 2022-09-29

## 2022-09-29 ENCOUNTER — HOME CARE VISIT (OUTPATIENT)
Dept: SCHEDULING | Facility: HOME HEALTH | Age: 67
End: 2022-09-29
Payer: MEDICARE

## 2022-09-29 VITALS
WEIGHT: 208 LBS | HEART RATE: 80 BPM | DIASTOLIC BLOOD PRESSURE: 63 MMHG | BODY MASS INDEX: 24.56 KG/M2 | SYSTOLIC BLOOD PRESSURE: 119 MMHG | HEIGHT: 77 IN | OXYGEN SATURATION: 96 % | TEMPERATURE: 97.3 F | RESPIRATION RATE: 18 BRPM

## 2022-09-29 DIAGNOSIS — Z98.1 S/P LUMBAR FUSION: Primary | ICD-10-CM

## 2022-09-29 PROCEDURE — 400013 HH SOC

## 2022-09-29 PROCEDURE — G0299 HHS/HOSPICE OF RN EA 15 MIN: HCPCS

## 2022-09-29 ASSESSMENT — ENCOUNTER SYMPTOMS
PAIN LOCATION - PAIN QUALITY: SORE
DYSPNEA ACTIVITY LEVEL: AFTER AMBULATING MORE THAN 20 FT

## 2022-09-29 NOTE — TELEPHONE ENCOUNTER
Wu Gamez was seen today for orders.     Diagnoses and all orders for this visit:    S/P lumbar fusion  -     DME SUPPLY ORDER

## 2022-09-29 NOTE — TELEPHONE ENCOUNTER
Arnoldo Kulkarni is wanting to get verbal orders to see him 2 times per week for 2 weeks and then one time per week for 2 weeks and 3 PRN visits.  Please call

## 2022-09-29 NOTE — TELEPHONE ENCOUNTER
Pt. Just had back surgery and is requesting PCP place order for a walker/rollator with a seat to be sent to DME company.

## 2022-09-30 ENCOUNTER — TELEPHONE (OUTPATIENT)
Dept: FAMILY MEDICINE CLINIC | Facility: CLINIC | Age: 67
End: 2022-09-30

## 2022-09-30 DIAGNOSIS — Z98.1 S/P LUMBAR FUSION: ICD-10-CM

## 2022-09-30 DIAGNOSIS — Z98.1 S/P LAMINECTOMY WITH SPINAL FUSION: Primary | ICD-10-CM

## 2022-09-30 NOTE — DISCHARGE SUMMARY
Discharge Summary    Patient Wendy Walsh  [id number]  1955  73 y.o. 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 PREOPERATIVE DIAGNOSES:  L4-L5 and L5-S1 with spondylolisthesis and spinal stenosis and severe degenerative disk disease. POSTOPERATIVE DIAGNOSES:  L4-L5 and L5-S1 with spondylolisthesis and spinal stenosis and severe degenerative disk disease with notable foraminal stenosis bilaterally from L4 through S1.     PROCEDURE PERFORMED:  1.  Bilateral L4, L5, and S1 laminectomy with partial facetectomy and extensive foraminotomy bilaterally from L4 through S1, CPT code 85763, 51047 x2.  2.  Posterolateral spinal fusion of L4-L5 and L5-S1, CPT code [address] [address]. 3.  Placement of segmental spinal instrumentation from L4 to S1 using the [place] cortical screw and edith system, CPT code 55824. INDICATIONS:  The patient is a 60-year-old gentleman who has been developing progressively worsening low back and leg pain with numbness and tingling and limited ability to walk. He reports weakness in the right leg. He was found to have significant degeneration from the L4 through S1 level with a rather severe foraminal stenosis as well. He failed to get long-term improvement with epidural steroid injections, medications, and physical therapy, so he is here for surgical treatment. Hospital Course: Patient admitted to ortho floor. Antibiotics were given postop. SCD and karen hose were in place for DVT prophylaxis. Bean catheter was in place until POD#1 then discontinued. Patient did not receive blood transfusion.   The patient tolerated pain medications and po diet. The drain output gradually diminished and was then removed. Dressing remained clean, dry, and intact. Physical Therapy started on the day following surgery and initially he did well, but then began having severe pain right leg. We adjusted pain medications without relief. We ordered MRI of lumbar spine to check for compressive hematoma. There was no hematoma, however there was some residual foraminal stenosis on the right at L5-S1 and this was felt to be likely the source of the pain. We consulted interventional radiology for selective nerve root block. This procedure was performed and symptoms improved. Patient was able to progress with therapy and was discharged home. At the time of discharge, the patient had understanding of precautions needed following surgery. Postoperative complications requiring extended length of stay: None    Discharged to: Home    Discharge Medications:      Medication List        START taking these medications      gabapentin 300 MG capsule  Commonly known as: NEURONTIN  Take 2 capsules by mouth 3 times daily for 30 days. lisinopril 20 MG tablet  Commonly known as: PRINIVIL;ZESTRIL  Take 1 tablet by mouth daily     oxyCODONE HCl 10 MG immediate release tablet  Commonly known as: OXY-IR  Take 1 tablet by mouth every 6 hours as needed for Pain for up to 7 days.             CHANGE how you take these medications      fluticasone 50 MCG/ACT nasal spray  Commonly known as: FLONASE  1 spray by Nasal route daily USE 2 SPRAYS EACH NOSTRIL A DAY AS NEEDED  What changed:   how much to take  when to take this  reasons to take this  additional instructions            CONTINUE taking these medications      amLODIPine 10 MG tablet  Commonly known as: NORVASC  Take 1 tablet by mouth daily     bisacodyl 5 MG EC tablet  Commonly known as: DULCOLAX  Take 1 tablet by mouth daily     dicyclomine 10 MG capsule  Commonly known as: BENTYL  Take 2 capsules by mouth 3 times daily as needed (stomach cramping)     Handicap Placard Misc  by Does not apply route     polyethylene glycol 17 GM/SCOOP powder  Commonly known as: GLYCOLAX               Where to Get Your Medications        These medications were sent to [facility]pharmacy [id number], [address] [place] [facility]      Phone: [phone]   gabapentin 300 MG capsule  oxyCODONE HCl 10 MG immediate release tablet       Information about where to get these medications is not yet available    Ask your nurse or doctor about these medications  lisinopril 20 MG tablet          Discharge instructions:  -Resume pre hospital diet            -Resume home medications per medical continuation form     -Follow up in office as scheduled   -Call doctor immediately if T>100.5, increased pain, swelling, drainage.   -If shortness of breath or chest pain, immediately go to ER  -Post surgical instruction sheet given to patient    Signed:  Ethan Armenta PA-C  9/30/2022

## 2022-09-30 NOTE — TELEPHONE ENCOUNTER
Patient called stating the order was for a standard walker and he needs a rollator with a seat so he can take a seat when he is tired of walking.

## 2022-10-02 NOTE — TELEPHONE ENCOUNTER
Marie De Luna was seen today for orders.     Diagnoses and all orders for this visit:    S/P laminectomy with spinal fusion  -     DME SUPPLY ORDER    S/P lumbar fusion  -     DME SUPPLY ORDER

## 2022-10-03 ENCOUNTER — OFFICE VISIT (OUTPATIENT)
Dept: ORTHOPEDIC SURGERY | Age: 67
End: 2022-10-03

## 2022-10-03 DIAGNOSIS — Z98.1 STATUS POST LUMBAR SPINAL ARTHRODESIS: Primary | ICD-10-CM

## 2022-10-03 PROCEDURE — 99024 POSTOP FOLLOW-UP VISIT: CPT | Performed by: ORTHOPAEDIC SURGERY

## 2022-10-04 ENCOUNTER — TELEPHONE (OUTPATIENT)
Dept: FAMILY MEDICINE CLINIC | Facility: CLINIC | Age: 67
End: 2022-10-04

## 2022-10-04 ENCOUNTER — HOME CARE VISIT (OUTPATIENT)
Dept: SCHEDULING | Facility: HOME HEALTH | Age: 67
End: 2022-10-04
Payer: MEDICARE

## 2022-10-04 DIAGNOSIS — K58.2 IRRITABLE BOWEL SYNDROME WITH BOTH CONSTIPATION AND DIARRHEA: ICD-10-CM

## 2022-10-04 PROCEDURE — G0299 HHS/HOSPICE OF RN EA 15 MIN: HCPCS

## 2022-10-04 RX ORDER — POLYETHYLENE GLYCOL 3350 17 G/17G
17 POWDER, FOR SOLUTION ORAL DAILY
Qty: 507 G | Refills: 5 | Status: SHIPPED | OUTPATIENT
Start: 2022-10-04

## 2022-10-04 RX ORDER — DICYCLOMINE HYDROCHLORIDE 10 MG/1
20 CAPSULE ORAL 3 TIMES DAILY PRN
Qty: 60 CAPSULE | Refills: 5 | Status: SHIPPED | OUTPATIENT
Start: 2022-10-04

## 2022-10-06 ENCOUNTER — HOME CARE VISIT (OUTPATIENT)
Dept: SCHEDULING | Facility: HOME HEALTH | Age: 67
End: 2022-10-06
Payer: MEDICARE

## 2022-10-06 VITALS
SYSTOLIC BLOOD PRESSURE: 148 MMHG | TEMPERATURE: 99.7 F | OXYGEN SATURATION: 97 % | HEART RATE: 92 BPM | DIASTOLIC BLOOD PRESSURE: 76 MMHG | RESPIRATION RATE: 15 BRPM

## 2022-10-06 PROCEDURE — G0151 HHCP-SERV OF PT,EA 15 MIN: HCPCS

## 2022-10-06 ASSESSMENT — ENCOUNTER SYMPTOMS: STOOL DESCRIPTION: SOFT FORMED

## 2022-10-07 ENCOUNTER — HOME CARE VISIT (OUTPATIENT)
Dept: SCHEDULING | Facility: HOME HEALTH | Age: 67
End: 2022-10-07
Payer: MEDICARE

## 2022-10-07 VITALS
HEART RATE: 74 BPM | DIASTOLIC BLOOD PRESSURE: 78 MMHG | TEMPERATURE: 97.8 F | RESPIRATION RATE: 19 BRPM | OXYGEN SATURATION: 93 % | SYSTOLIC BLOOD PRESSURE: 138 MMHG

## 2022-10-07 PROCEDURE — G0299 HHS/HOSPICE OF RN EA 15 MIN: HCPCS

## 2022-10-09 VITALS
RESPIRATION RATE: 19 BRPM | DIASTOLIC BLOOD PRESSURE: 74 MMHG | SYSTOLIC BLOOD PRESSURE: 132 MMHG | TEMPERATURE: 98 F | OXYGEN SATURATION: 98 % | HEART RATE: 77 BPM

## 2022-10-10 ENCOUNTER — HOME CARE VISIT (OUTPATIENT)
Dept: SCHEDULING | Facility: HOME HEALTH | Age: 67
End: 2022-10-10
Payer: MEDICARE

## 2022-10-11 ENCOUNTER — HOME CARE VISIT (OUTPATIENT)
Dept: SCHEDULING | Facility: HOME HEALTH | Age: 67
End: 2022-10-11
Payer: MEDICARE

## 2022-10-11 VITALS
SYSTOLIC BLOOD PRESSURE: 136 MMHG | HEART RATE: 70 BPM | TEMPERATURE: 98.3 F | DIASTOLIC BLOOD PRESSURE: 80 MMHG | RESPIRATION RATE: 18 BRPM | OXYGEN SATURATION: 94 %

## 2022-10-11 PROCEDURE — G0152 HHCP-SERV OF OT,EA 15 MIN: HCPCS

## 2022-10-11 PROCEDURE — G0299 HHS/HOSPICE OF RN EA 15 MIN: HCPCS

## 2022-10-11 ASSESSMENT — ENCOUNTER SYMPTOMS
INDIGESTION: 1
PAIN LOCATION - PAIN QUALITY: SHOOTING

## 2022-10-12 ENCOUNTER — HOME CARE VISIT (OUTPATIENT)
Dept: SCHEDULING | Facility: HOME HEALTH | Age: 67
End: 2022-10-12
Payer: MEDICARE

## 2022-10-12 VITALS
RESPIRATION RATE: 16 BRPM | DIASTOLIC BLOOD PRESSURE: 78 MMHG | HEART RATE: 75 BPM | SYSTOLIC BLOOD PRESSURE: 128 MMHG | OXYGEN SATURATION: 97 % | TEMPERATURE: 97.4 F

## 2022-10-12 ASSESSMENT — ENCOUNTER SYMPTOMS: PAIN LOCATION - PAIN QUALITY: BURNING

## 2022-10-13 ENCOUNTER — TELEPHONE (OUTPATIENT)
Dept: FAMILY MEDICINE CLINIC | Facility: CLINIC | Age: 67
End: 2022-10-13

## 2022-10-13 PROBLEM — Z12.11 COLON CANCER SCREENING: Status: RESOLVED | Noted: 2022-09-13 | Resolved: 2022-10-13

## 2022-10-14 ENCOUNTER — HOME CARE VISIT (OUTPATIENT)
Dept: SCHEDULING | Facility: HOME HEALTH | Age: 67
End: 2022-10-14
Payer: MEDICARE

## 2022-10-14 VITALS
SYSTOLIC BLOOD PRESSURE: 118 MMHG | RESPIRATION RATE: 18 BRPM | OXYGEN SATURATION: 94 % | TEMPERATURE: 98.1 F | DIASTOLIC BLOOD PRESSURE: 68 MMHG | HEART RATE: 87 BPM

## 2022-10-14 VITALS
DIASTOLIC BLOOD PRESSURE: 84 MMHG | SYSTOLIC BLOOD PRESSURE: 138 MMHG | RESPIRATION RATE: 17 BRPM | HEART RATE: 80 BPM | TEMPERATURE: 98.6 F | OXYGEN SATURATION: 95 %

## 2022-10-14 PROCEDURE — G0157 HHC PT ASSISTANT EA 15: HCPCS

## 2022-10-14 PROCEDURE — G0299 HHS/HOSPICE OF RN EA 15 MIN: HCPCS

## 2022-10-14 ASSESSMENT — ENCOUNTER SYMPTOMS: INDIGESTION: 1

## 2022-10-17 ENCOUNTER — HOME CARE VISIT (OUTPATIENT)
Dept: SCHEDULING | Facility: HOME HEALTH | Age: 67
End: 2022-10-17
Payer: MEDICARE

## 2022-10-17 VITALS
SYSTOLIC BLOOD PRESSURE: 138 MMHG | OXYGEN SATURATION: 98 % | DIASTOLIC BLOOD PRESSURE: 88 MMHG | HEART RATE: 80 BPM | RESPIRATION RATE: 17 BRPM | TEMPERATURE: 97.5 F

## 2022-10-17 PROCEDURE — G0157 HHC PT ASSISTANT EA 15: HCPCS

## 2022-10-17 ASSESSMENT — ENCOUNTER SYMPTOMS: PAIN LOCATION - PAIN QUALITY: STIFF

## 2022-10-18 ENCOUNTER — HOME CARE VISIT (OUTPATIENT)
Dept: SCHEDULING | Facility: HOME HEALTH | Age: 67
End: 2022-10-18
Payer: MEDICARE

## 2022-10-18 PROCEDURE — G0299 HHS/HOSPICE OF RN EA 15 MIN: HCPCS

## 2022-10-19 ENCOUNTER — HOME CARE VISIT (OUTPATIENT)
Dept: SCHEDULING | Facility: HOME HEALTH | Age: 67
End: 2022-10-19
Payer: MEDICARE

## 2022-10-19 VITALS
SYSTOLIC BLOOD PRESSURE: 108 MMHG | TEMPERATURE: 98 F | RESPIRATION RATE: 16 BRPM | HEART RATE: 76 BPM | DIASTOLIC BLOOD PRESSURE: 68 MMHG | OXYGEN SATURATION: 96 %

## 2022-10-19 VITALS
RESPIRATION RATE: 20 BRPM | HEART RATE: 88 BPM | SYSTOLIC BLOOD PRESSURE: 134 MMHG | OXYGEN SATURATION: 9 % | DIASTOLIC BLOOD PRESSURE: 74 MMHG | TEMPERATURE: 97.3 F

## 2022-10-19 PROCEDURE — G0151 HHCP-SERV OF PT,EA 15 MIN: HCPCS

## 2022-10-19 ASSESSMENT — ENCOUNTER SYMPTOMS
PAIN LOCATION - PAIN QUALITY: ACHE
STOOL DESCRIPTION: SOFT FORMED

## 2022-10-31 ENCOUNTER — OFFICE VISIT (OUTPATIENT)
Dept: ORTHOPEDIC SURGERY | Age: 67
End: 2022-10-31

## 2022-10-31 DIAGNOSIS — Z98.1 STATUS POST LUMBAR SPINAL ARTHRODESIS: Primary | ICD-10-CM

## 2022-10-31 DIAGNOSIS — G95.9 CERVICAL MYELOPATHY (HCC): ICD-10-CM

## 2022-10-31 PROCEDURE — 99024 POSTOP FOLLOW-UP VISIT: CPT | Performed by: ORTHOPAEDIC SURGERY

## 2022-10-31 RX ORDER — HYDROCODONE BITARTRATE AND ACETAMINOPHEN 5; 325 MG/1; MG/1
1 TABLET ORAL EVERY 4 HOURS PRN
Qty: 40 TABLET | Refills: 0 | Status: SHIPPED | OUTPATIENT
Start: 2022-10-31 | End: 2022-11-07

## 2022-10-31 NOTE — PROGRESS NOTES
Name: Cielo Balbuena  YOB: 1955  Gender: male  MRN: [id number]  Age: 73 y.o. Chief Complaint: Lumbar spine surgery follow up    History of Present Illness:      Cielo Balbuena  is here for 6-week follow up of his  lumbar posteolateral fusion surgery. He reports minimal relief of preoperative lower extremity pain, weakness and parasthesias. There is the expected residual back stiffness. The patient continues to ambulate with a walker. He states that since the surgery his gait has become very unstable. He has also noted numbness and tingling in both hands rather diffusely since his surgery. Medications:       Current Outpatient Medications:     Ca Carbonate-Mag Hydroxide (ROLAIDS) 550-110 MG CHEW, Take 2 tablets by mouth as needed (indigestion, heartburn). , Disp: , Rfl:     acetaminophen (TYLENOL) 500 MG tablet, Take 500 mg by mouth every 6 hours as needed for Pain., Disp: , Rfl:     dicyclomine (BENTYL) 10 MG capsule, Take 2 capsules by mouth 3 times daily as needed (stomach cramping), Disp: 60 capsule, Rfl: 5    polyethylene glycol (GLYCOLAX) 17 GM/SCOOP powder, Take 17 g by mouth daily, Disp: 507 g, Rfl: 5    bisacodyl (DULCOLAX) 5 MG EC tablet, Take 1 tablet by mouth daily, Disp: 30 tablet, Rfl: 5    lisinopril (PRINIVIL;ZESTRIL) 20 MG tablet, Take 1 tablet by mouth daily, Disp: 30 tablet, Rfl: 0    gabapentin (NEURONTIN) 300 MG capsule, Take 2 capsules by mouth 3 times daily for 30 days. , Disp: 90 capsule, Rfl: 3    amLODIPine (NORVASC) 10 MG tablet, Take 1 tablet by mouth daily, Disp: 90 tablet, Rfl: 3    fluticasone (FLONASE) 50 MCG/ACT nasal spray, 1 spray by Nasal route daily USE 2 SPRAYS EACH NOSTRIL A DAY AS NEEDED (Patient taking differently: 2 sprays by Nasal route daily as needed for Rhinitis.), Disp: 16 g, Rfl: 5    Handicap Placard MISC, by Does not apply route, Disp: 1 each, Rfl: 0    Allergies:  No Known Allergies      Physical Exam:      Respirations are unlabored and there is no evidence of cyanosis      Wound is healed. There is some underlying fluctuance. No erythema. Gait is quite unsteady with a walker. Sensory testing reveals intact sensation to light touch and in the distribution of the L3-S1 dermatomes bilaterally with the exception of the right lower extremity in a multi dermatome pattern predominantly affecting L4 and L5 but also S1. Strength testing in the lower extremity reveals the following based on the 5 point grading scale:       HF (L2) H Ab (L5) KE (L3/4) ADF (L4) EHL (L5) A Ev (S1) APF (S1)   Right 5 5 5 4 4 5 5   Left 5 5 5 5 5 5 5        Radiographic Studies:     X-rays including AP and lateral views of the lumbar spine were reviewed and interpreted:     Postoperative changes are noted status post lumbar fusion with instrumentation. The hardware appears to be well-placed and intact. There is no evidence of significant osteolysis. No significant adjacent level decompensation. Alignment is maintained. Radiographic Impression:    Appropriate postoperative changes status post lumbar laminectomy and fusion without evidence for hardware failure or decompensation. His MRI from the hospital was reviewed and again shows postoperative changes status post lumbar laminectomy and fusion surgery L4-S1. There does appear to be a postop hematoma causing some stenosis. However, there is also noted to be severe residual right-sided foraminal stenosis at both levels. Diagnosis:      ICD-10-CM    1. Status post lumbar spinal arthrodesis  Z98.1 XR LUMBAR SPINE (2-3 VIEWS)      2. Cervical myelopathy ([facility])  G95.9           Assessment/Plan: This patient's history is concerning for or new cervical myelopathy since his lumbar surgery. I think this should be worked up with his cervical MRI. However, his lumbar MRI also shows some potential etiologies for right leg pain. This may have to be addressed later.   However, I would recommend checking out the cervical spine first to rule out myelopathy. I have prescribed hydrocodone. He will follow-up after his cervical MRI.         Neva Hammond MD    10/31/22  11:26 AM

## 2022-11-14 ENCOUNTER — TELEPHONE (OUTPATIENT)
Dept: ORTHOPEDIC SURGERY | Age: 67
End: 2022-11-14

## 2022-11-14 NOTE — TELEPHONE ENCOUNTER
Aurea Magaña from [facility] called out outstanding order that is needing Dr. Calin Rodrigues, she states she has faxed them twice with no response and is requesting a call back due to they cant bill for their services.  Aurea Magaña number is [phone]

## 2022-11-15 NOTE — TELEPHONE ENCOUNTER
Returned call and told her that I faxed them over today. I also asked that she send any other orders needing Namita Alan' sig to [facility].

## 2022-11-28 ENCOUNTER — OFFICE VISIT (OUTPATIENT)
Dept: ENT CLINIC | Age: 67
End: 2022-11-28
Payer: MEDICARE

## 2022-11-28 VITALS
BODY MASS INDEX: 23.26 KG/M2 | SYSTOLIC BLOOD PRESSURE: 148 MMHG | DIASTOLIC BLOOD PRESSURE: 92 MMHG | HEIGHT: 77 IN | WEIGHT: 197 LBS

## 2022-11-28 DIAGNOSIS — K21.9 LPRD (LARYNGOPHARYNGEAL REFLUX DISEASE): Primary | ICD-10-CM

## 2022-11-28 DIAGNOSIS — R09.81 CHRONIC NASAL CONGESTION: ICD-10-CM

## 2022-11-28 PROCEDURE — 3078F DIAST BP <80 MM HG: CPT | Performed by: OTOLARYNGOLOGY

## 2022-11-28 PROCEDURE — 99204 OFFICE O/P NEW MOD 45 MIN: CPT | Performed by: OTOLARYNGOLOGY

## 2022-11-28 PROCEDURE — 1123F ACP DISCUSS/DSCN MKR DOCD: CPT | Performed by: OTOLARYNGOLOGY

## 2022-11-28 PROCEDURE — 3074F SYST BP LT 130 MM HG: CPT | Performed by: OTOLARYNGOLOGY

## 2022-11-28 RX ORDER — OMEPRAZOLE 40 MG/1
40 CAPSULE, DELAYED RELEASE ORAL
Qty: 30 CAPSULE | Refills: 5 | Status: SHIPPED | OUTPATIENT
Start: 2022-11-28

## 2022-11-28 ASSESSMENT — ENCOUNTER SYMPTOMS
COUGH: 0
VOMITING: 0
COLOR CHANGE: 0
EYE PAIN: 0
DIARRHEA: 0
WHEEZING: 0

## 2022-11-28 NOTE — PROGRESS NOTES
Chief Complaint   Patient presents with    Sinus Problem     Patient states that he has sinus issues. HPI:  Ian Galvan is a 73 y.o. male seen today in initial consultation. He mainly has noted an acidic taste in his throat which occurs almost immediately after eating. This has been going on for the past several years. He takes Rolaids as needed, and this does seem to help with this sensation. There is no chronic sore throat or odynophagia. He had similar symptoms years ago and was treated with Prilosec which did seem to help. He is not currently on a PPI. His other concern today is some thick but clear nasal drainage which occurs chronically. He uses Flonase intermittently. No previous nasal pathology. Past Medical History, Past Surgical History, Family history, Social History, and Medications were all reviewed with the patient today and updated as necessary. No Known Allergies  Patient Active Problem List   Diagnosis    Essential hypertension    Slow transit constipation    Spinal stenosis of lumbar region with neurogenic claudication    Spondylolysis of lumbar region    Impacted cerumen of right ear    Chronic frontal sinusitis    S/P laminectomy with spinal fusion    S/P lumbar fusion     Current Outpatient Medications   Medication Sig    omeprazole (PRILOSEC) 40 MG delayed release capsule Take 1 capsule by mouth every morning (before breakfast)    Ca Carbonate-Mag Hydroxide (ROLAIDS) 550-110 MG CHEW Take 2 tablets by mouth as needed (indigestion, heartburn). acetaminophen (TYLENOL) 500 MG tablet Take 500 mg by mouth every 6 hours as needed for Pain.     dicyclomine (BENTYL) 10 MG capsule Take 2 capsules by mouth 3 times daily as needed (stomach cramping)    polyethylene glycol (GLYCOLAX) 17 GM/SCOOP powder Take 17 g by mouth daily    bisacodyl (DULCOLAX) 5 MG EC tablet Take 1 tablet by mouth daily    amLODIPine (NORVASC) 10 MG tablet Take 1 tablet by mouth daily    fluticasone (FLONASE) 50 MCG/ACT nasal spray 1 spray by Nasal route daily USE 2 SPRAYS EACH NOSTRIL A DAY AS NEEDED (Patient taking differently: 2 sprays by Nasal route daily as needed for Rhinitis)    Handicap Placard MISC by Does not apply route    lisinopril (PRINIVIL;ZESTRIL) 20 MG tablet Take 1 tablet by mouth daily    gabapentin (NEURONTIN) 300 MG capsule Take 2 capsules by mouth 3 times daily for 30 days. No current facility-administered medications for this visit. Past Medical History:   Diagnosis Date    Cerebral artery occlusion with cerebral infarction ([facility] [address] 07/03/2021    admitted to hospital \"pt states no stroke only his nerves in back\"    Hx of echocardiogram 07/06/2021    Hypertension      Social History     Tobacco Use    Smoking status: Every Day     Packs/day: 0.25     Years: 10.00     Pack years: 2.50     Types: Cigarettes    Smokeless tobacco: Never   Substance Use Topics    Alcohol use: Yes     Alcohol/week: 7.0 standard drinks     Types: 7 Shots of liquor per week     Past Surgical History:   Procedure Laterality Date    LUMBAR FUSION N/A 9/19/2022    LUMBAR 4- SACRAL 1  LAMINECTOMY FUSION WITH  TRANSFORAMINAL LUMBAR INTERBODY FUSION POSTERIOR ONE LEVEL performed by Skylar Alan MD at [facility] MAIN OR    NEUROLOGICAL SURGERY      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 Eyes:  Negative for pain. Respiratory:  Negative for cough and wheezing. Cardiovascular:  Negative for chest pain and palpitations. Gastrointestinal:  Negative for diarrhea and vomiting. Skin:  Negative for color change and wound. Allergic/Immunologic: Negative for environmental allergies. Neurological:  Negative for dizziness and headaches. PHYSICAL EXAM:    BP (!) 148/92 (Site: Left Upper Arm, Position: Sitting)   Ht 6' 5\" (1.956 m)   Wt 197 lb (89.4 kg)   BMI 23.36 kg/m²     General: NAD, well-appearing  Neuro: No gross neuro deficits. CN's II-XII intact. No facial weakness. Eyes: EOMI. Pupils reactive. No periorbital edema/ecchymosis. No nystagmus. Skin: No facial erythema, rashes or concerning lesions. Nose: No external deviations or saddling. Intranasally, septum is dev inferiorly off to R side without perforations, nasal mucosa appears healthy with no erythema, mucopurulence, or polyps. Mouth: Moist mucus membranes, normal tongue/palate mobility, no concerning mucosal lesions. Oropharynx clear with no erythema/exudate, no tonsillar hypertrophy. Ears: Normal appearing auricles, no hematomas. EACs clear with no cerumen impaction, healthy canal skin, TM's intact with no perforations or retraction pockets. No middle ear effusions. Neck: Soft, supple, no palpable lateral neck masses. No palpable parotid or submandibular masses. No thyromegaly or palpable thyroid nodules. No surgical scars. Lymphatics: No palpable cervical LAD. Resp: No audible stridor or wheezing. CV: No murmurs, no JVD. Extremities: No clubbing or cyanosis. ASSESSMENT and PLAN      ICD-10-CM    1. LPRD (laryngopharyngeal reflux disease)  K21.9       2. Chronic nasal congestion  R09.81         His throat symptoms are most consistent with LPRD. I started him on Omeprazole 40 mg daily and he will continue using Rolaids as needed. His nasal exam was clear other than mild septal deviation to the right side. He will use his Flonase twice daily and see if that helps with his nasal congestion. RTC in 6 weeks.     Dara Hart MD  11/28/2022    Electronically signed by Dara Hart MD on 11/28/2022 at 2:04 PM

## 2022-12-02 ENCOUNTER — TELEPHONE (OUTPATIENT)
Dept: ORTHOPEDIC SURGERY | Age: 67
End: 2022-12-02

## 2022-12-02 DIAGNOSIS — Z98.1 STATUS POST LUMBAR SPINAL ARTHRODESIS: Primary | ICD-10-CM

## 2022-12-02 RX ORDER — HYDROCODONE BITARTRATE AND ACETAMINOPHEN 5; 325 MG/1; MG/1
1 TABLET ORAL EVERY 4 HOURS PRN
Qty: 40 TABLET | Refills: 0 | Status: SHIPPED | OUTPATIENT
Start: 2022-12-02 | End: 2022-12-07

## 2023-01-09 ENCOUNTER — TELEPHONE (OUTPATIENT)
Dept: ORTHOPEDIC SURGERY | Age: 68
End: 2023-01-09

## 2023-01-09 DIAGNOSIS — Z98.1 STATUS POST LUMBAR SPINAL ARTHRODESIS: Primary | ICD-10-CM

## 2023-01-09 DIAGNOSIS — G95.9 CERVICAL MYELOPATHY (HCC): ICD-10-CM

## 2023-01-09 RX ORDER — HYDROCODONE BITARTRATE AND ACETAMINOPHEN 5; 325 MG/1; MG/1
1 TABLET ORAL EVERY 6 HOURS PRN
Qty: 28 TABLET | Refills: 0 | Status: SHIPPED | OUTPATIENT
Start: 2023-01-09 | End: 2023-01-16

## 2023-01-09 NOTE — TELEPHONE ENCOUNTER
He is asking for a refill on his pain meds. He says it is 5mg but doesn't have the bottle. [facility]

## 2023-01-23 ENCOUNTER — TELEPHONE (OUTPATIENT)
Dept: FAMILY MEDICINE CLINIC | Facility: CLINIC | Age: 68
End: 2023-01-23

## 2023-01-23 NOTE — TELEPHONE ENCOUNTER
Patient is requesting a referral be sent to a   Podiatry specialist and if anything further is needed from patient to do   so.

## 2023-01-23 NOTE — TELEPHONE ENCOUNTER
----- Message from Maria A Lanza sent at 1/20/2023  4:17 PM EST -----  Subject: Message to Provider    QUESTIONS  Information for Provider? Patient is requesting a referral be sent to a   Podiatry specialist and if anything further is needed from patient to do   so.   ---------------------------------------------------------------------------  --------------  [id number]  [phone]; OK to leave message on voicemail  ---------------------------------------------------------------------------  --------------  SCRIPT ANSWERS  Relationship to Patient?  Self

## 2023-02-03 ENCOUNTER — TELEPHONE (OUTPATIENT)
Dept: FAMILY MEDICINE CLINIC | Facility: CLINIC | Age: 68
End: 2023-02-03

## 2023-02-03 DIAGNOSIS — B35.1 ONYCHOMYCOSIS: Primary | ICD-10-CM

## 2023-02-03 RX ORDER — GABAPENTIN 300 MG/1
600 CAPSULE ORAL 3 TIMES DAILY
Qty: 90 CAPSULE | Refills: 3 | Status: SHIPPED | OUTPATIENT
Start: 2023-02-03 | End: 2023-03-05

## 2023-02-03 NOTE — TELEPHONE ENCOUNTER
Patient needs refills on following medication sent to [facility] on [address]      gabapentin (NEURONTIN) 300 MG capsule [[id number]]

## 2023-02-03 NOTE — TELEPHONE ENCOUNTER
----- Message from Usmankojo Tal sent at 2/2/2023  1:15 PM EST -----  Subject: Referral Request    Reason for referral request? Patient is requesting a referral for a foot   doctor (Podiatrist). He is c/o thick toenails on great toes, not able to   trim on his own. Please advise. Provider patient wants to be referred to(if known):     Provider Phone Number(if known): Additional Information for Provider?  Please be sure that any specialist   will accept his insurance, which is [facility]  ---------------------------------------------------------------------------  --------------  [id number]    [phone]; OK to leave message on voicemail  ---------------------------------------------------------------------------  --------------

## 2023-02-13 ENCOUNTER — OFFICE VISIT (OUTPATIENT)
Dept: ORTHOPEDIC SURGERY | Age: 68
End: 2023-02-13

## 2023-02-13 DIAGNOSIS — G95.9 CERVICAL MYELOPATHY (HCC): ICD-10-CM

## 2023-02-13 DIAGNOSIS — M48.02 CERVICAL SPINAL STENOSIS: ICD-10-CM

## 2023-02-13 DIAGNOSIS — Z98.1 STATUS POST LUMBAR SPINAL ARTHRODESIS: Primary | ICD-10-CM

## 2023-02-13 RX ORDER — HYDROCODONE BITARTRATE AND ACETAMINOPHEN 5; 325 MG/1; MG/1
1 TABLET ORAL EVERY 6 HOURS PRN
Qty: 28 TABLET | Refills: 0 | Status: SHIPPED | OUTPATIENT
Start: 2023-02-13 | End: 2023-02-20

## 2023-02-13 NOTE — PROGRESS NOTES
Name: Miguelangel Pham  YOB: 1955  Gender: male  MRN: [id number]  Age: 78 y.o. Chief Complaint: Neck and radiating upper extremity pain. History of present illness: This is a very pleasant 78 y.o. male who was a patient of Dr. Landon Avitia who underwent a lumbar fusion surgery back in September 2022. The patient has done reasonably well with his low back surgery. However, when I had last seen him he was describing cramps in his hands and numbness and tingling in all 4 extremities. I felt that he had spasticity and intrinsic weakness and recommended cervical MRI. He is here to follow-up on that study. Medications:   Current Outpatient Medications   Medication Sig    gabapentin (NEURONTIN) 300 MG capsule Take 2 capsules by mouth 3 times daily for 30 days. omeprazole (PRILOSEC) 40 MG delayed release capsule Take 1 capsule by mouth every morning (before breakfast)    Ca Carbonate-Mag Hydroxide (ROLAIDS) 550-110 MG CHEW Take 2 tablets by mouth as needed (indigestion, heartburn). acetaminophen (TYLENOL) 500 MG tablet Take 500 mg by mouth every 6 hours as needed for Pain. dicyclomine (BENTYL) 10 MG capsule Take 2 capsules by mouth 3 times daily as needed (stomach cramping)    polyethylene glycol (GLYCOLAX) 17 GM/SCOOP powder Take 17 g by mouth daily    bisacodyl (DULCOLAX) 5 MG EC tablet Take 1 tablet by mouth daily    lisinopril (PRINIVIL;ZESTRIL) 20 MG tablet Take 1 tablet by mouth daily    amLODIPine (NORVASC) 10 MG tablet Take 1 tablet by mouth daily    fluticasone (FLONASE) 50 MCG/ACT nasal spray 1 spray by Nasal route daily USE 2 SPRAYS EACH NOSTRIL A DAY AS NEEDED (Patient taking differently: 2 sprays by Nasal route daily as needed for Rhinitis)    Handicap Placard MISC by Does not apply route     No current facility-administered medications for this visit. Allergies:   No Known Allergies     Physical Exam:     Oriented to person, place and time.     Chest is clear to ascultation. Heart is regular rate and rhythm. Spurling's sign is positive to the bilateral  side for reproduction of radicular symptoms. Gait is spastic and unsteady. There is subjective light touch sensory loss over the upper extremities rather diffusely in a glove like pattern. Desai's is positive         He has weakness in right ankle dorsiflexion and EHL. Strength testing in the upper extremity reveals the following based on the 5 point grading scale:       Delt(C5) Bicep(C6) WE(C6) Tricep (C7) WF(C7) (C8) Int (T1)   Right 5 5 5 5 5 4 4   Left 5 5 5 5 5 4 5     Radiographic Studies:    MRI Cervical spine, report and images reviewed and interpreted and reveals he has advanced multilevel spondylosis. There is a large area of myelomalacia behind the C4-C5 disc. There is associated stenosis and cord compression here. AP and lateral views of the lumbar spine taken in office today show postoperative changes status post lumbar laminectomy and fusion with instrumentation. The fusion appears to be consolidated with bridging bone around the facets. No adjacent level subluxations or instability. Lordosis is maintained. Radiographic impression: Satisfactory postoperative changes status post lumbar fusion    Diagnosis:      ICD-10-CM    1. Status post lumbar spinal arthrodesis  Z98.1 XR LUMBAR SPINE (2-3 VIEWS)      2. Cervical myelopathy ([facility])  G95.9       3. Cervical spinal stenosis  M48.02           Assessment/Plan: This patient's clinical history and physical exam is consistent with cervical myelopathy. The imaging studies are concordant with the patient's symptoms. I recommended surgical intervention. We discussed the details of the surgery including an incision over the left side of the front of the neck. The windpipe and foodpipe would be retracted to the side to expose the underlying spine.   The appropriate disc would be identified with an X-ray and the disc would be removed. The nerves would be freed by trimming any impinging structures such as bone spurs and disc material.   The disc space would then be filled with an interbody spacer and bone graft from a cadaver. A drain may be placed, and then the wound would be closed with suture and covered with sterile dressings. He would expect to either be discharged same day or stay in the hospital until overnight depending on how quickly she recovers. Follow-up would be scheduled for 2-3 weeks and she would have restrictions including no driving for 2 weeks, no lifting greater than 15 lbs. We also discussed the potential risks of the surgery including, but not limited to infection, spinal fluid leak, compressive hematoma; injury to spinal cord or peripheral nerve root resulting in paralysis, or loss of use of an extremity; persistent neck or arm symptoms or pain at the bone graft site; failure of the bone graft to heal or failure of the hardware resulting in the possibility of needing additional surgery; postoperative hoarseness or dysphagia; injury to an artery or vein resulting in significant blood loss; and the risks of anesthesia including, but not limited heart attack, stroke, blood clot or death. The patient was also given a brochure on anterior cervical discectomy and fusion. The patient voiced an understanding of these issues outlined. The procedure that I think may be beneficial here is an anterior cervical discectomy and fusion with interbody spacer, allograft and instrumentation from C4-C5. In regard to his lumbar spine, he is doing well and his radiographs show a solid fusion. I do not think we need to keep getting x-rays of that.         Electronically Signed By Julián Hoffmann MD     12:27 PM

## 2023-02-16 DIAGNOSIS — J30.2 SEASONAL ALLERGIES: ICD-10-CM

## 2023-02-16 RX ORDER — FLUTICASONE PROPIONATE 50 MCG
SPRAY, SUSPENSION (ML) NASAL
Qty: 48 G | Refills: 5 | Status: SHIPPED | OUTPATIENT
Start: 2023-02-16

## 2023-02-17 ENCOUNTER — TELEPHONE (OUTPATIENT)
Dept: ORTHOPEDIC SURGERY | Age: 68
End: 2023-02-17

## 2023-02-17 NOTE — TELEPHONE ENCOUNTER
He is in [place] and needs a refill on his hydrocodone. The pharmacy said it needs to be called in. [facility] is where he wants it sent. Their phone # is [phone]. He is completely out of the medication.

## 2023-02-21 ENCOUNTER — TELEPHONE (OUTPATIENT)
Dept: ORTHOPEDIC SURGERY | Age: 68
End: 2023-02-21

## 2023-02-21 NOTE — TELEPHONE ENCOUNTER
He is calling again about his refill. He says it has been over a month since he got it. Your note says it was due yesterday so let him know if it will be sent in. He is still in [place].

## 2023-02-22 NOTE — TELEPHONE ENCOUNTER
Discussed with Dr Kayleigh Monroe and he is not going to refill medication. Patient is requesting prescription to be [place] out of state.

## 2023-02-23 ENCOUNTER — TELEPHONE (OUTPATIENT)
Dept: ORTHOPEDIC SURGERY | Age: 68
End: 2023-02-23

## 2023-02-23 NOTE — TELEPHONE ENCOUNTER
He left a voicemail that he has been calling about his RX being sent to [place] where he is. Please let him know the status.

## 2023-02-23 NOTE — TELEPHONE ENCOUNTER
Spoke with patient and let him know that Dr. Horace Bobo would not be refilling medication out of state.   Also let him know that we could send a pain management referral when he returns to [place].

## 2023-03-24 NOTE — TELEPHONE ENCOUNTER
I faxed the DME order to [facility]  at . Pronounced by Dr. Imtiaz Luna. Family is at bedside, communicated with .    At this time family is unsure what next steps they would like to take, will make final decisions tomorrow morning.    Primary notified via page.    [employer] contacted.

## 2023-05-16 ENCOUNTER — TELEPHONE (OUTPATIENT)
Dept: ORTHOPEDIC SURGERY | Age: 68
End: 2023-05-16

## 2023-05-16 NOTE — TELEPHONE ENCOUNTER
Sent to appointments to make appointment to discuss treatment or I can make an appointment to pain management for eval and treatment of ongoing pain

## 2023-05-16 NOTE — TELEPHONE ENCOUNTER
He is back at home. He says [facility] would not refill his pain meds when he was in [place]. He is back and is [facility] [place] [facility].

## 2023-05-16 NOTE — TELEPHONE ENCOUNTER
I called the patient about 45 mins ago and he held the phone and would not say hello he then calls back in and says he missed a call from use . So I call him again and give him the options to either make an appt or be ref'd to pain management. Still have not got answer out of him yet , not sure if he hung up or phone went dead.

## 2023-05-17 ENCOUNTER — TELEPHONE (OUTPATIENT)
Dept: FAMILY MEDICINE CLINIC | Facility: CLINIC | Age: 68
End: 2023-05-17

## 2023-06-26 RX ORDER — OMEPRAZOLE 40 MG/1
CAPSULE, DELAYED RELEASE ORAL
Qty: 90 CAPSULE | Refills: 5 | Status: SHIPPED | OUTPATIENT
Start: 2023-06-26 | End: 2023-06-30 | Stop reason: SDUPTHER

## 2023-06-30 ENCOUNTER — TELEMEDICINE (OUTPATIENT)
Dept: PRIMARY CARE CLINIC | Facility: CLINIC | Age: 68
End: 2023-06-30

## 2023-06-30 DIAGNOSIS — F19.11 HISTORY OF DRUG ABUSE (HCC): ICD-10-CM

## 2023-06-30 DIAGNOSIS — M48.062 SPINAL STENOSIS OF LUMBAR REGION WITH NEUROGENIC CLAUDICATION: ICD-10-CM

## 2023-06-30 DIAGNOSIS — Z87.891 EX-SMOKER: ICD-10-CM

## 2023-06-30 DIAGNOSIS — I10 ESSENTIAL HYPERTENSION: Primary | ICD-10-CM

## 2023-06-30 DIAGNOSIS — Z98.1 S/P LAMINECTOMY WITH SPINAL FUSION: ICD-10-CM

## 2023-06-30 DIAGNOSIS — K21.9 GASTROESOPHAGEAL REFLUX DISEASE WITHOUT ESOPHAGITIS: ICD-10-CM

## 2023-06-30 PROCEDURE — 99442 PR PHYS/QHP TELEPHONE EVALUATION 11-20 MIN: CPT | Performed by: FAMILY MEDICINE

## 2023-06-30 RX ORDER — TIZANIDINE 2 MG/1
TABLET ORAL
Qty: 60 TABLET | Refills: 1 | Status: SHIPPED | OUTPATIENT
Start: 2023-06-30

## 2023-06-30 RX ORDER — OMEPRAZOLE 40 MG/1
CAPSULE, DELAYED RELEASE ORAL
Qty: 90 CAPSULE | Refills: 1 | Status: SHIPPED | OUTPATIENT
Start: 2023-06-30

## 2023-06-30 RX ORDER — LISINOPRIL 20 MG/1
20 TABLET ORAL DAILY
Qty: 90 TABLET | Refills: 0 | Status: SHIPPED | OUTPATIENT
Start: 2023-06-30

## 2023-06-30 RX ORDER — DULOXETIN HYDROCHLORIDE 30 MG/1
30 CAPSULE, DELAYED RELEASE ORAL DAILY
Qty: 90 CAPSULE | Refills: 3 | Status: SHIPPED | OUTPATIENT
Start: 2023-06-30

## 2023-06-30 RX ORDER — AMLODIPINE BESYLATE 5 MG/1
10 TABLET ORAL DAILY
Qty: 90 TABLET | Refills: 5 | Status: SHIPPED | OUTPATIENT
Start: 2023-06-30

## 2023-06-30 ASSESSMENT — PATIENT HEALTH QUESTIONNAIRE - PHQ9
SUM OF ALL RESPONSES TO PHQ QUESTIONS 1-9: 0
2. FEELING DOWN, DEPRESSED OR HOPELESS: 0
SUM OF ALL RESPONSES TO PHQ9 QUESTIONS 1 & 2: 0
SUM OF ALL RESPONSES TO PHQ QUESTIONS 1-9: 0
1. LITTLE INTEREST OR PLEASURE IN DOING THINGS: 0
SUM OF ALL RESPONSES TO PHQ QUESTIONS 1-9: 0
SUM OF ALL RESPONSES TO PHQ QUESTIONS 1-9: 0

## 2023-06-30 ASSESSMENT — ENCOUNTER SYMPTOMS
SHORTNESS OF BREATH: 0
EYES NEGATIVE: 1
RESPIRATORY NEGATIVE: 1
GASTROINTESTINAL NEGATIVE: 1
WHEEZING: 0
BACK PAIN: 1

## 2023-07-26 RX ORDER — TIZANIDINE 2 MG/1
TABLET ORAL
Qty: 60 TABLET | Refills: 1 | Status: SHIPPED | OUTPATIENT
Start: 2023-07-26

## 2023-08-31 ENCOUNTER — OFFICE VISIT (OUTPATIENT)
Dept: ORTHOPEDIC SURGERY | Age: 68
End: 2023-08-31
Payer: MEDICARE

## 2023-08-31 DIAGNOSIS — M48.02 CERVICAL SPINAL STENOSIS: ICD-10-CM

## 2023-08-31 DIAGNOSIS — G95.9 CERVICAL MYELOPATHY (HCC): Primary | ICD-10-CM

## 2023-08-31 PROCEDURE — G8420 CALC BMI NORM PARAMETERS: HCPCS | Performed by: ORTHOPAEDIC SURGERY

## 2023-08-31 PROCEDURE — G8427 DOCREV CUR MEDS BY ELIG CLIN: HCPCS | Performed by: ORTHOPAEDIC SURGERY

## 2023-08-31 PROCEDURE — 3017F COLORECTAL CA SCREEN DOC REV: CPT | Performed by: ORTHOPAEDIC SURGERY

## 2023-08-31 PROCEDURE — 1123F ACP DISCUSS/DSCN MKR DOCD: CPT | Performed by: ORTHOPAEDIC SURGERY

## 2023-08-31 PROCEDURE — 4004F PT TOBACCO SCREEN RCVD TLK: CPT | Performed by: ORTHOPAEDIC SURGERY

## 2023-08-31 PROCEDURE — 99214 OFFICE O/P EST MOD 30 MIN: CPT | Performed by: ORTHOPAEDIC SURGERY

## 2023-08-31 NOTE — PROGRESS NOTES
6 hours as needed for Pain. (Patient not taking: Reported on 6/30/2023)    dicyclomine (BENTYL) 10 MG capsule Take 2 capsules by mouth 3 times daily as needed (stomach cramping) (Patient not taking: Reported on 6/30/2023)    polyethylene glycol (GLYCOLAX) 17 GM/SCOOP powder Take 17 g by mouth daily (Patient not taking: Reported on 6/30/2023)    bisacodyl (DULCOLAX) 5 MG EC tablet Take 1 tablet by mouth daily (Patient not taking: Reported on 6/30/2023)    Handicap Placard MISC by Does not apply route (Patient not taking: Reported on 6/30/2023)     No current facility-administered medications for this visit. Allergies:   No Known Allergies     Physical Exam:     Oriented to person, place and time. Chest is clear to ascultation. Heart is regular rate and rhythm. Gait is quite unsteady and spastic. There is subjective light touch sensory loss over the upper extremities rather diffusely in a glove like pattern. Desai's is positive         Strength testing in the upper extremity reveals the following based on the 5 point grading scale:       Delt(C5) Bicep(C6) WE(C6) Tricep (C7) WF(C7) (C8) Int (T1)   Right 5 5 5 5 5 4 4   Left 5 5 5 5 5 4 4     Radiographic Studies:    AP and lateral views of the cervical spine obtained in office today show advanced spondylosis, disc degeneration, and large anterior osteophytes. He also has loss of lordosis. Radiographic impression: Advanced diffuse cervical spondylosis and loss of lordosis. MRI Cervical spine, report and images reviewed and interpreted and reveals advanced multilevel spondylotic changes resulting in stenosis from C4-C7. There is a focal high signal in the spinal cord from C4-C5 along with cord atrophy at these levels. There is stenosis at C4-C5, C5-C6, and C6-C7. There is severe bilateral foraminal stenosis at each of these levels. Diagnosis:      ICD-10-CM    1. Cervical myelopathy ([facility])  G95.9       2.  Cervical spinal stenosis

## 2023-09-11 ENCOUNTER — PREP FOR PROCEDURE (OUTPATIENT)
Dept: ORTHOPEDIC SURGERY | Age: 68
End: 2023-09-11

## 2023-09-11 DIAGNOSIS — M48.02 CERVICAL SPINAL STENOSIS: ICD-10-CM

## 2023-09-11 DIAGNOSIS — G95.9 CERVICAL MYELOPATHY (HCC): Primary | ICD-10-CM

## 2023-09-18 RX ORDER — LISINOPRIL 20 MG/1
20 TABLET ORAL DAILY
Qty: 90 TABLET | Refills: 3 | Status: SHIPPED | OUTPATIENT
Start: 2023-09-18

## 2023-09-27 ENCOUNTER — TELEPHONE (OUTPATIENT)
Dept: ORTHOPEDIC SURGERY | Age: 68
End: 2023-09-27

## 2023-09-27 NOTE — TELEPHONE ENCOUNTER
Sending case message to the hospital surgery scheduler's to change his pre op appointment.  He needs 3 days to get transportation for his appointments

## 2023-09-28 NOTE — PROGRESS NOTES
Donna Cortez  : 1955  Primary: [facility] (Medicare Managed)  Secondary: Hermilo Coker @ [address] [place] [address]  Phone: [phone]  Fax: [phone] Plan Frequency: 2 visits per week for 8 weeks (with plan to decreased frequency to 1 visit per week as symptoms and functional progression allow)    Plan of Care/Certification Expiration Date: 23 (Start of [employer] 2023.)      >PT Visit Info:  Plan Frequency: 2 visits per week for 8 weeks (with plan to decreased frequency to 1 visit per week as symptoms and functional progression allow)  Plan of Care/Certification Expiration Date: 23 (Start of [employer] 2023.)      Visit Count:  1    OUTPATIENT PHYSICAL THERAPY:OP NOTE TYPE: OP Note Type: Treatment Note 2023       Episode  }Appt Desk             Treatment Diagnosis:   Visit Diagnoses         Codes    Arthrodesis status    -  Primary Z98.1    Other low back pain     M54.59    Neck pain     M54.2    Muscle weakness (generalized)     M62.81    Difficulty in walking, not elsewhere classified     R26.2          Medical/Referring Diagnosis:  Arthrodesis status [Z98.1]  Spinal stenosis, lumbar region with neurogenic claudication [R63.158]  Referring Physician:  Evens Thomson MD MD Orders:  PT Eval and Treat  Date of Onset:  Onset Date: 22 (s/p lumbar fusion surgery 2022. )     Allergies:   Patient has no known allergies. Restrictions/Precautions:  Restrictions/Precautions: Fall Risk  No data recorded     Interventions Planned (Treatment may consist of any combination of the following):    Current Treatment Recommendations: Strengthening; ROM; Balance training; Functional mobility training; Transfer training; ADL/Self-care training;  Endurance training; IADL training; Gait training; Stair training; Neuromuscular re-education; Manual; Pain management; Home exercise program; Safety education activities CGA to Min A to prevent loss of balance; discussed using standard walker/rolling walker instead of SPC to improve balance in/out of home. Pt verbalized understanding. Communication/Consultation:   HEP handout provided. Equipment provided today:  None  Recommendations/Intent for next treatment session: Next visit will focus on pain control, improve lumbar and cervical spine AROM, improve core and postural stability strength, improve balance/gait with focus on falls prevention and reducing falls risk. >Total Treatment Billable Duration:  54 minutes, 15 min eval, 39 min therex.   Time In: 0945  Time Out: [address] [facility] 15, PT       Charge Capture  }Post Session Pain  PT Visit [facility] Portal  MD Guidelines  Scanned Media  Benefits  MyChart    Future Appointments   Date Time Provider [address]   10/4/2023 12:30 PM Mahad Mccauley, PT [facility]   10/5/2023  7:30 AM SFE ASSESSMENT RM 09 S[facility]PA [facility]

## 2023-09-28 NOTE — THERAPY EVALUATION
Carlisle Scales  : 1955  Primary: [facility]el[facility] (Medicare Managed)  Secondary: Hermilo Coker @ [address] [place] [address]  Phone: [phone]  Fax: [phone] Plan Frequency: 2 visits per week for 8 weeks (with plan to decreased frequency to 1 visit per week as symptoms and functional progression allow)    Plan of Care/Certification Expiration Date: 23 (Start of [employer] 2023.)      PT Visit Info:  Plan Frequency: 2 visits per week for 8 weeks (with plan to decreased frequency to 1 visit per week as symptoms and functional progression allow)  Plan of Care/Certification Expiration Date: 23 (Start of [employer] 2023.)      Visit Count:  1                OUTPATIENT PHYSICAL THERAPY:             OP NOTE TYPE: Initial Assessment 2023               Episode (Low back/neck pain) Appt Desk         Treatment Diagnosis:   Visit Diagnoses         Codes    Arthrodesis status    -  Primary Z98.1    Other low back pain     M54.59    Neck pain     M54.2    Muscle weakness (generalized)     M62.81    Difficulty in walking, not elsewhere classified     R26.2          Medical/Referring Diagnosis:  Arthrodesis status [Z98.1]  Spinal stenosis, lumbar region with neurogenic claudication [F14.215]  Referring Physician:  Reba Herrera MD MD Orders:  PT Eval and Treat  Return MD Appt:  10/5/2023  Date of Onset:  Onset Date: 22 (s/p lumbar fusion surgery 2022. )      Allergies:  Patient has no known allergies. Restrictions/Precautions:    Restrictions/Precautions: Fall Risk        Medications Last Reviewed:  2023     SUBJECTIVE   History of Injury/Illness (Reason for Referral):  Mr. Nasreen Wheatley is a 77 y/o male with primary complaints of poor balance and general decreased strength that has been progressively worsening over the course of approximately 1 to 1.5 years.  Pt reports underwent surgery to work activities. JACI score of 5/50 or less in order to demonstrate overall functional improvement. Outcome Measure: Tool Used: Modified Oswestry Low Back Pain Questionnaire  Score:  Initial: 10/50  Most Recent: X/50 (Date: -- )   Interpretation of Score: Each section is scored on a 0-5 scale, 5 representing the greatest disability. The scores of each section are added together for a total score of 50. Medical Necessity:   > Patient is expected to demonstrate progress in strength, range of motion, balance, coordination, and functional technique to increase independence with community distance ambulation, functional transfers, functional bending and stooping activities and yard work.  > Skilled intervention continues to be required due to decreased AROM, decreased strength, decreased balance, decreased gait, decreased functional mobility. Reason For Services/Other Comments:  > Patient continues to require skilled intervention due to increasing complexity of exercise. Total Duration:  Time In: 0945  Time Out: 3404    Regarding Taz Gagnon's therapy, I certify that the treatment plan above will be carried out by a therapist or under their direction.   Thank you for this referral,  Jono Hammond, PT     Referring Physician Signature: Jose Price MD _______________________________ Date _____________        Post Session Pain  Charge Capture  PT Visit Info MD Dory Flores

## 2023-09-29 ENCOUNTER — HOSPITAL ENCOUNTER (OUTPATIENT)
Dept: PHYSICAL THERAPY | Age: 68
Setting detail: RECURRING SERIES
End: 2023-09-29
Payer: MEDICARE

## 2023-09-29 DIAGNOSIS — Z98.1 ARTHRODESIS STATUS: Primary | ICD-10-CM

## 2023-09-29 DIAGNOSIS — M48.062 SPINAL STENOSIS OF LUMBAR REGION WITH NEUROGENIC CLAUDICATION: ICD-10-CM

## 2023-09-29 DIAGNOSIS — M54.59 OTHER LOW BACK PAIN: ICD-10-CM

## 2023-09-29 DIAGNOSIS — M62.81 MUSCLE WEAKNESS (GENERALIZED): ICD-10-CM

## 2023-09-29 DIAGNOSIS — R26.2 DIFFICULTY IN WALKING, NOT ELSEWHERE CLASSIFIED: ICD-10-CM

## 2023-09-29 DIAGNOSIS — M54.2 NECK PAIN: ICD-10-CM

## 2023-09-29 PROCEDURE — 97161 PT EVAL LOW COMPLEX 20 MIN: CPT

## 2023-09-29 PROCEDURE — 97110 THERAPEUTIC EXERCISES: CPT

## 2023-09-29 ASSESSMENT — PAIN SCALES - GENERAL: PAINLEVEL_OUTOF10: 3

## 2023-10-10 ENCOUNTER — HOSPITAL ENCOUNTER (OUTPATIENT)
Dept: PHYSICAL THERAPY | Age: 68
Setting detail: RECURRING SERIES
Discharge: HOME | End: 2023-10-13
Payer: MEDICARE

## 2023-10-10 PROCEDURE — 97110 THERAPEUTIC EXERCISES: CPT

## 2023-10-10 PROCEDURE — 97140 MANUAL THERAPY 1/> REGIONS: CPT

## 2023-10-10 ASSESSMENT — PAIN SCALES - GENERAL: PAINLEVEL_OUTOF10: 0

## 2023-10-10 NOTE — PROGRESS NOTES
side.    MODALITIES: (0 minutes):        None today. HEP: As above; handouts given to patient for all exercises. Treatment/Session Summary:    >Treatment Assessment: Pt. showed moderate tolerance to exercise progression; added hip abduction and adduction exercises into today's therapy session to help improve strength progression. Communication/Consultation:  None today  Equipment provided today:  None  Recommendations/Intent for next treatment session: Next visit will focus on pain control, improve lumbar and cervical spine AROM, improve core and postural stability strength, improve balance/gait with focus on falls prevention and reducing falls risk. >Total Treatment Billable Duration:  54 minutes  Time In: 1334  Time Out: 1429    Edilson Jenkins PTA       Charge Capture  }Post Session Pain  PT Visit Info  Barcoding Portal  MD Guidelines  Scanned Media  Benefits  MyChart    No future appointments.

## 2023-10-16 ENCOUNTER — HOSPITAL ENCOUNTER (OUTPATIENT)
Dept: PHYSICAL THERAPY | Age: 68
Setting detail: RECURRING SERIES
End: 2023-10-16
Payer: MEDICARE

## 2023-10-16 ENCOUNTER — TELEPHONE (OUTPATIENT)
Dept: ORTHOPEDIC SURGERY | Age: 68
End: 2023-10-16

## 2023-10-16 NOTE — PROGRESS NOTES
Physical Therapy  [address] [place] [address]  Date: 10/16/2023    Patient cancelled due to trouble with his transportation today.       Diana Hawkins DPT

## 2023-10-16 NOTE — TELEPHONE ENCOUNTER
Called patient today to ask him why he did not make his pre assessment appointment. He stated he was going to call today to cancel his surgery for Wednesday. He said he was still healing from the last surgery.  I sent a case message to cancel his surgery

## 2023-10-19 ENCOUNTER — HOSPITAL ENCOUNTER (OUTPATIENT)
Dept: PHYSICAL THERAPY | Age: 68
Setting detail: RECURRING SERIES
Discharge: HOME | End: 2023-10-22
Payer: MEDICARE

## 2023-10-19 PROCEDURE — 97110 THERAPEUTIC EXERCISES: CPT

## 2023-10-19 PROCEDURE — 97140 MANUAL THERAPY 1/> REGIONS: CPT

## 2023-10-19 ASSESSMENT — PAIN SCALES - GENERAL: PAINLEVEL_OUTOF10: 0

## 2023-10-19 NOTE — PROGRESS NOTES
Angela Schooling  : 1955  Primary: Marcela Resendiz Hmo (Medicare Managed)  Secondary: Hermilo Coker @ [facility] [address]  Phone: [phone]  Fax: [phone] Plan Frequency: 2 visits per week for 8 weeks (with plan to decreased frequency to 1 visit per week as symptoms and functional progression allow)    Plan of Care/Certification Expiration Date: 23 (Start of [employer] 2023.)      >PT Visit Info:  Plan Frequency: 2 visits per week for 8 weeks (with plan to decreased frequency to 1 visit per week as symptoms and functional progression allow)  Plan of Care/Certification Expiration Date: 23 (Start of [employer] 2023.)      Visit Count:  3    OUTPATIENT PHYSICAL THERAPY:OP NOTE TYPE: OP Note Type: Treatment Note 10/19/2023       Episode  }Appt Desk             Treatment Diagnosis:   Visit Diagnoses           Codes     Arthrodesis status    -  Primary Z98.1     Other low back pain     M54.59     Neck pain     M54.2     Muscle weakness (generalized)     M62.81     Difficulty in walking, not elsewhere classified           Medical/Referring Diagnosis:  Arthrodesis status [Z98.1]  Spinal stenosis, lumbar region with neurogenic claudication [H18.317]  Referring Physician:  Yosi Osei MD MD Orders:  PT Eval and Treat  Date of Onset:  Onset Date: 22 (s/p lumbar fusion surgery 2022. )     Allergies:   Patient has no known allergies. Restrictions/Precautions:  Restrictions/Precautions: Fall Risk  No data recorded     Interventions Planned (Treatment may consist of any combination of the following):    Current Treatment Recommendations: Strengthening; ROM; Balance training; Functional mobility training; Transfer training; ADL/Self-care training;  Endurance training; IADL training; Gait training; Stair training; Neuromuscular re-education; Manual; Pain management; Home exercise program; Safety education &

## 2023-10-23 ENCOUNTER — HOSPITAL ENCOUNTER (OUTPATIENT)
Dept: PHYSICAL THERAPY | Age: 68
Setting detail: RECURRING SERIES
Discharge: HOME | End: 2023-10-26
Payer: MEDICARE

## 2023-10-23 PROCEDURE — 97110 THERAPEUTIC EXERCISES: CPT

## 2023-10-23 PROCEDURE — 97140 MANUAL THERAPY 1/> REGIONS: CPT

## 2023-10-23 ASSESSMENT — PAIN SCALES - GENERAL: PAINLEVEL_OUTOF10: 2

## 2023-10-23 NOTE — PROGRESS NOTES
SLR -- X10 B --   PPT X15, 3 second holds X15, 3 second holds X15, 3 second holds   Seated Sciatic Nerve Johnson -- X10 B --   Socratic access code: [email or web address]    Time spent with patient reviewing proper muscle recruitment and technique with exercises. MANUAL THERAPY: (12 minutes):   Joint mobilization and Soft tissue mobilization was utilized and necessary because of the patient's restricted joint motion, painful spasm, loss of articular motion, and restricted motion of soft tissue. - Joint mobs: None today. - Soft tissue mobilization: applied to lumbar paraspinals laying on his left side. MODALITIES: (0 minutes):        None today. HEP: As above; handouts given to patient for all exercises. Treatment/Session Summary:    >Treatment Assessment: Pt. showed moderate tolerance to exercise progression; sets were increased during his daily hip adduction exercise to help improve strength progression. Communication/Consultation:  None today  Equipment provided today:  None  Recommendations/Intent for next treatment session: Next visit will focus on pain control, improve lumbar and cervical spine AROM, improve core and postural stability strength, improve balance/gait with focus on falls prevention and reducing falls risk.     >Total Treatment Billable Duration:  54 minutes  Time In: 1230  Time Out: MARTHA Gan       Charge Capture  }Post Session Pain  PT Visit Info  [employer] Portal  MD Guidelines  Scanned Media  Benefits  MyChart    Future Appointments   Date Time Provider [address]   10/26/2023  1:30 PM Kianna Greene, PT [facility]   11/6/2023 11:15 AM Matt Moreno MD [facility]   11/22/2023  2:00 PM Kamran Warren, APRN - CNP [facility]

## 2023-10-26 ENCOUNTER — HOSPITAL ENCOUNTER (OUTPATIENT)
Dept: PHYSICAL THERAPY | Age: 68
Setting detail: RECURRING SERIES
End: 2023-10-26
Payer: MEDICARE

## 2023-10-26 NOTE — PROGRESS NOTES
Physical Therapy  [address]  Date: 10/26/2023    Patient cancelled appt due to transportation problem.       Tony Castro DPT

## 2024-01-15 NOTE — PERIOP NOTE
and Bharath Galloway II CRNA at bedside wanting to complete the pt's right shoulder reduction. RN notified that the providers are at bedside and ready to start procedure. RN was not able to leave other pt at that time, writer talked with lead, Kamryn MOROCHO about having an RN in the room along with two ER techs. Kamryn MOROCHO stated \"to ask Dr. Giles if he would like an RN to assist.\"   asked  if he would like the assistance of an RN in the room and he stated \" no we should be fine\"  and Sonia DOBSON, ER tech were in room assisting with the shoulder reduction. RN in room after procedure.   Pt eating at this time. Pt in NAD and without complaint. Will contnue to monitor.

## 2024-02-12 PROBLEM — F19.11 HISTORY OF DRUG ABUSE (HCC): Status: ACTIVE | Noted: 2024-02-12

## 2024-02-12 PROBLEM — Z98.1 S/P LAMINECTOMY WITH SPINAL FUSION: Status: RESOLVED | Noted: 2022-09-19 | Resolved: 2024-02-12

## 2024-02-12 PROBLEM — H61.21 IMPACTED CERUMEN OF RIGHT EAR: Status: RESOLVED | Noted: 2022-09-13 | Resolved: 2024-02-12

## 2024-02-12 PROBLEM — Z98.1 S/P LUMBAR FUSION: Status: RESOLVED | Noted: 2022-09-21 | Resolved: 2024-02-12

## 2024-02-12 PROBLEM — J32.1 CHRONIC FRONTAL SINUSITIS: Status: RESOLVED | Noted: 2022-09-13 | Resolved: 2024-02-12

## 2024-03-28 ENCOUNTER — OFFICE VISIT (OUTPATIENT)
Dept: ORTHOPEDIC SURGERY | Age: 69
End: 2024-03-28
Payer: MEDICARE

## 2024-03-28 DIAGNOSIS — G95.9 CERVICAL MYELOPATHY (HCC): Primary | ICD-10-CM

## 2024-03-28 DIAGNOSIS — M48.02 CERVICAL SPINAL STENOSIS: ICD-10-CM

## 2024-03-28 PROCEDURE — 99214 OFFICE O/P EST MOD 30 MIN: CPT | Performed by: ORTHOPAEDIC SURGERY

## 2024-03-28 PROCEDURE — G8427 DOCREV CUR MEDS BY ELIG CLIN: HCPCS | Performed by: ORTHOPAEDIC SURGERY

## 2024-03-28 PROCEDURE — G8421 BMI NOT CALCULATED: HCPCS | Performed by: ORTHOPAEDIC SURGERY

## 2024-03-28 PROCEDURE — 1123F ACP DISCUSS/DSCN MKR DOCD: CPT | Performed by: ORTHOPAEDIC SURGERY

## 2024-03-28 PROCEDURE — G8484 FLU IMMUNIZE NO ADMIN: HCPCS | Performed by: ORTHOPAEDIC SURGERY

## 2024-03-28 PROCEDURE — 4004F PT TOBACCO SCREEN RCVD TLK: CPT | Performed by: ORTHOPAEDIC SURGERY

## 2024-03-28 PROCEDURE — 3017F COLORECTAL CA SCREEN DOC REV: CPT | Performed by: ORTHOPAEDIC SURGERY

## 2024-04-01 ENCOUNTER — PREP FOR PROCEDURE (OUTPATIENT)
Dept: ORTHOPEDIC SURGERY | Age: 69
End: 2024-04-01

## 2024-04-01 DIAGNOSIS — M48.02 CERVICAL SPINAL STENOSIS: ICD-10-CM

## 2024-04-01 DIAGNOSIS — G95.9 CERVICAL MYELOPATHY (HCC): Primary | ICD-10-CM

## 2024-04-08 RX ORDER — ACETAMINOPHEN 325 MG/1
1000 TABLET ORAL ONCE
Status: CANCELLED | OUTPATIENT
Start: 2024-04-08 | End: 2024-04-08

## 2024-04-11 ENCOUNTER — HOSPITAL ENCOUNTER (OUTPATIENT)
Dept: SURGERY | Age: 69
Discharge: HOME | End: 2024-04-11
Payer: MEDICARE

## 2024-04-11 VITALS
TEMPERATURE: 97.5 F | SYSTOLIC BLOOD PRESSURE: 136 MMHG | RESPIRATION RATE: 16 BRPM | DIASTOLIC BLOOD PRESSURE: 80 MMHG | BODY MASS INDEX: 23.57 KG/M2 | OXYGEN SATURATION: 94 % | WEIGHT: 199.6 LBS | HEART RATE: 61 BPM | HEIGHT: 77 IN

## 2024-04-11 DIAGNOSIS — R94.31 ABNORMAL EKG: Primary | ICD-10-CM

## 2024-04-11 LAB
ANION GAP SERPL CALC-SCNC: 6 MMOL/L (ref 2–11)
APPEARANCE UR: CLEAR
BACTERIA URNS QL MICRO: NEGATIVE /HPF
BASOPHILS # BLD: 0 K/UL (ref 0–0.2)
BASOPHILS NFR BLD: 0 % (ref 0–2)
BILIRUB UR QL: NEGATIVE
BUN SERPL-MCNC: 22 MG/DL (ref 8–23)
CALCIUM SERPL-MCNC: 9.8 MG/DL (ref 8.3–10.4)
CASTS URNS QL MICRO: ABNORMAL /LPF
CHLORIDE SERPL-SCNC: 106 MMOL/L (ref 103–113)
CO2 SERPL-SCNC: 28 MMOL/L (ref 21–32)
COLOR UR: ABNORMAL
CREAT SERPL-MCNC: 0.99 MG/DL (ref 0.8–1.5)
DIFFERENTIAL METHOD BLD: NORMAL
EOSINOPHIL # BLD: 0.1 K/UL (ref 0–0.8)
EOSINOPHIL NFR BLD: 2 % (ref 0.5–7.8)
EPI CELLS #/AREA URNS HPF: ABNORMAL /HPF
ERYTHROCYTE [DISTWIDTH] IN BLOOD BY AUTOMATED COUNT: 12.5 % (ref 11.9–14.6)
GLUCOSE SERPL-MCNC: 115 MG/DL (ref 65–100)
GLUCOSE UR STRIP.AUTO-MCNC: NEGATIVE MG/DL
HCT VFR BLD AUTO: 42 % (ref 41.1–50.3)
HGB BLD-MCNC: 14.4 G/DL (ref 13.6–17.2)
HGB UR QL STRIP: NEGATIVE
IMM GRANULOCYTES # BLD AUTO: 0 K/UL (ref 0–0.5)
IMM GRANULOCYTES NFR BLD AUTO: 0 % (ref 0–5)
KETONES UR QL STRIP.AUTO: NEGATIVE MG/DL
LEUKOCYTE ESTERASE UR QL STRIP.AUTO: NEGATIVE
LYMPHOCYTES # BLD: 2.2 K/UL (ref 0.5–4.6)
LYMPHOCYTES NFR BLD: 32 % (ref 13–44)
MCH RBC QN AUTO: 31.4 PG (ref 26.1–32.9)
MCHC RBC AUTO-ENTMCNC: 34.3 G/DL (ref 31.4–35)
MCV RBC AUTO: 91.5 FL (ref 82–102)
MONOCYTES # BLD: 0.8 K/UL (ref 0.1–1.3)
MONOCYTES NFR BLD: 11 % (ref 4–12)
NEUTS SEG # BLD: 3.8 K/UL (ref 1.7–8.2)
NEUTS SEG NFR BLD: 54 % (ref 43–78)
NITRITE UR QL STRIP.AUTO: NEGATIVE
NRBC # BLD: 0 K/UL (ref 0–0.2)
PH UR STRIP: 5 (ref 5–9)
PLATELET # BLD AUTO: 155 K/UL (ref 150–450)
PMV BLD AUTO: 9.6 FL (ref 9.4–12.3)
POTASSIUM SERPL-SCNC: 3.6 MMOL/L (ref 3.5–5.1)
PROT UR STRIP-MCNC: 100 MG/DL
RBC # BLD AUTO: 4.59 M/UL (ref 4.23–5.6)
RBC #/AREA URNS HPF: ABNORMAL /HPF
SODIUM SERPL-SCNC: 140 MMOL/L (ref 136–146)
SP GR UR REFRACTOMETRY: 1.03 (ref 1–1.02)
UROBILINOGEN UR QL STRIP.AUTO: 1 EU/DL (ref 0.2–1)
WBC # BLD AUTO: 7 K/UL (ref 4.3–11.1)
WBC URNS QL MICRO: ABNORMAL /HPF

## 2024-04-11 PROCEDURE — 93005 ELECTROCARDIOGRAM TRACING: CPT | Performed by: ANESTHESIOLOGY

## 2024-04-11 PROCEDURE — 85025 COMPLETE CBC W/AUTO DIFF WBC: CPT

## 2024-04-11 PROCEDURE — 81001 URINALYSIS AUTO W/SCOPE: CPT

## 2024-04-11 PROCEDURE — 80048 BASIC METABOLIC PNL TOTAL CA: CPT

## 2024-04-11 PROCEDURE — 87641 MR-STAPH DNA AMP PROBE: CPT

## 2024-04-11 ASSESSMENT — PAIN SCALES - GENERAL: PAINLEVEL_OUTOF10: 7

## 2024-04-11 NOTE — PERIOP NOTE
Patient verified name, , and surgery as listed in [facility]. Patient provided medical/health information and PTA medications to the best of their ability.    TYPE  CASE: II  Orders per surgeon: WERE received  Labs per surgeon: CBC W/DIFF, BMP, UA, MRSA. Results: PENDING  Labs per anesthesia protocol: T&S DOS  EKG:  DONE TODAY AND ABNORMAL, SENT TO ANESTHESIA TO REVIEW ALONG WITH EKG FROM ED VISIT 4/3/23     MRSA/MSSA swab collected; pharmacy to review and dose antibiotic as appropriate.     Patient provided with and instructed on education handouts including Guide to Surgery, blood transfusions, pain management, and hand hygiene for the family and community, and [facility] brochure.     Road to Recovery Spine surgery patient guide given. Instructed on incentive spirometry with return demonstration. Patient viewed spine prehab video.     Hibiclens and instructions given per hospital policy.    Original medication prescription bottles WERE visualized during patient appointment.     Patient teach back successful and patient demonstrates knowledge of instruction.

## 2024-04-11 NOTE — PERIOP NOTE
PLEASE CONTINUE TAKING ALL PRESCRIPTION MEDICATIONS UP TO THE DAY OF SURGERY UNLESS OTHERWISE DIRECTED BELOW. You may take Tylenol, allergy,  and/or indigestion medications.     TAKE ONLY THESE MEDICATIONS ON THE DAY OF SURGERY    Fluticasone propionate (Flonase)  Amlodipine (Norvasc)   Omeprazole (Prilosec)          DISCONTINUE all vitamins and supplements 7 days prior to surgery. DISCONTINUE Non-Steroidal Anti-Inflammatory (NSAIDS) such as Advil and Aleve 5 days prior to surgery.     Home Medications to Hold- please continue all other medications except these.    Lisinopril hold am of surgery        Comments      On the day before surgery please take 2 Tylenol in the morning and then again before bed. You may use either regular or extra strength.           Please do not bring home medications with you on the day of surgery unless otherwise directed by your nurse.  If you are instructed to bring home medications, please give them to your nurse as they will be administered by the nursing staff.    If you have any questions, please call [facility] [phone].    A copy of this note was provided to the patient for reference.

## 2024-04-11 NOTE — PERIOP NOTE
LABS within anesthesia guidelines, no follow-up required. Labs automatically routed to ordering provider via Epic documentation.

## 2024-04-12 LAB
EKG ATRIAL RATE: 54 BPM
EKG DIAGNOSIS: NORMAL
EKG P AXIS: 55 DEGREES
EKG P-R INTERVAL: 182 MS
EKG Q-T INTERVAL: 422 MS
EKG QRS DURATION: 114 MS
EKG QTC CALCULATION (BAZETT): 400 MS
EKG R AXIS: -57 DEGREES
EKG T AXIS: 59 DEGREES
EKG VENTRICULAR RATE: 54 BPM
MRSA DNA SPEC QL NAA+PROBE: NOT DETECTED
S AUREUS CPE NOSE QL NAA+PROBE: NOT DETECTED

## 2024-04-12 PROCEDURE — 93010 ELECTROCARDIOGRAM REPORT: CPT | Performed by: INTERNAL MEDICINE

## 2024-04-15 ENCOUNTER — TELEPHONE (OUTPATIENT)
Dept: SURGERY | Age: 69
End: 2024-04-15

## 2024-04-15 NOTE — PROGRESS NOTES
Pre-Assessment Surgery Review      Patient ID:  Uche Gagnon  [id number]  68 y.o.  1955  Surgeon: Dr Moreno  Date of Surgery: 4/24/2024  Procedure:   laminectomy and posterior fusion   Primary Care Physician: None, None None  Specialty Physician(s):      Subjective:   Uche Gagnon is a 68 y.o. White (non-) male who presents for preoperative evaluation. This is a preoperative chart review note based on data collected by the nurse at the surgical Pre-Assessment visit.    Past Medical History:   Diagnosis Date    Cerebral artery occlusion with cerebral infarction (HCC) 07/03/2021    admitted to hospital \"pt states no stroke only his nerves in back\"    Chronic frontal sinusitis 09/13/2022    Hx of echocardiogram 07/06/2021    Hypertension     Impacted cerumen of right ear 09/13/2022    S/P laminectomy with spinal fusion 09/19/2022    S/P lumbar fusion 09/21/2022      Past Surgical History:   Procedure Laterality Date    LUMBAR FUSION N/A 9/19/2022    LUMBAR 4- SACRAL 1  LAMINECTOMY FUSION WITH  TRANSFORAMINAL LUMBAR INTERBODY FUSION POSTERIOR ONE LEVEL performed by GILLIAN Britton III, MD at [facility] MAIN OR    NEUROLOGICAL SURGERY      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      ORTHOPEDIC SURGERY      left knee    SKIN GRAFT      right hand and lower arm     Family History   Problem Relation Age of Onset    Diabetes Sister     Stroke Father     Diabetes Brother     Cancer Neg Hx     Diabetes Mother         borderline    Heart Disease Mother         chf    Heart Disease Sister         MI  over 50      Social History     Tobacco Use    Smoking status: Every Day     Current packs/day: 0.25     Average packs/day: 0.3 packs/day for 10.0 years (2.5 ttl pk-yrs)     Types: Cigarettes    Smokeless tobacco: Never   Substance Use Topics    Alcohol use: Yes     Alcohol/week: 7.0 standard drinks of alcohol     Types: 7 Shots of liquor per week       Prior to Admission medications    Medication Sig Start Date End Date

## 2024-04-15 NOTE — TELEPHONE ENCOUNTER
Dr. Altman reviewed chart. New order received. Patient will need to see cardio prior to surgery due to abnormal EKG.

## 2024-04-17 NOTE — TELEPHONE ENCOUNTER
Called [facility] and spoke with Jorge. They had received the referral, but no appointment had been scheduled. He made an appointment on Friday April 19th at 3:45 at their [place] location at [address] [place] [address] , 2nd floor with Dr Silvestre for cardiac clearance for upcoming surgery. I called patient and reached voice mail. I left the information on his voice mail regarding this appointment.

## 2024-04-22 ENCOUNTER — TELEPHONE (OUTPATIENT)
Dept: SURGERY | Age: 69
End: 2024-04-22

## 2024-04-22 NOTE — TELEPHONE ENCOUNTER
Spoke with patient who confirms he was seen by Cardiology on 04/19/24. No notes from visit found at this time. Reaching out to confirm.

## 2024-04-22 NOTE — TELEPHONE ENCOUNTER
Per Cardiology, patient no showed to his appointment on Friday 04/19/24. Attempted to contact patient, but no answer. Voicemail left requesting callback. Per anesthesia, patient needs cardiology clearance prior to surgery.

## 2024-05-02 ENCOUNTER — INITIAL CONSULT (OUTPATIENT)
Age: 69
End: 2024-05-02
Payer: MEDICARE

## 2024-05-02 VITALS
WEIGHT: 200 LBS | HEIGHT: 77 IN | HEART RATE: 68 BPM | SYSTOLIC BLOOD PRESSURE: 150 MMHG | BODY MASS INDEX: 23.62 KG/M2 | DIASTOLIC BLOOD PRESSURE: 78 MMHG

## 2024-05-02 DIAGNOSIS — I10 ESSENTIAL HYPERTENSION: ICD-10-CM

## 2024-05-02 DIAGNOSIS — R07.2 PRECORDIAL PAIN: Primary | ICD-10-CM

## 2024-05-02 PROCEDURE — G8428 CUR MEDS NOT DOCUMENT: HCPCS | Performed by: INTERNAL MEDICINE

## 2024-05-02 PROCEDURE — 1123F ACP DISCUSS/DSCN MKR DOCD: CPT | Performed by: INTERNAL MEDICINE

## 2024-05-02 PROCEDURE — 4004F PT TOBACCO SCREEN RCVD TLK: CPT | Performed by: INTERNAL MEDICINE

## 2024-05-02 PROCEDURE — 3017F COLORECTAL CA SCREEN DOC REV: CPT | Performed by: INTERNAL MEDICINE

## 2024-05-02 PROCEDURE — G8420 CALC BMI NORM PARAMETERS: HCPCS | Performed by: INTERNAL MEDICINE

## 2024-05-02 PROCEDURE — 3078F DIAST BP <80 MM HG: CPT | Performed by: INTERNAL MEDICINE

## 2024-05-02 PROCEDURE — 99204 OFFICE O/P NEW MOD 45 MIN: CPT | Performed by: INTERNAL MEDICINE

## 2024-05-02 PROCEDURE — 3077F SYST BP >= 140 MM HG: CPT | Performed by: INTERNAL MEDICINE

## 2024-05-02 ASSESSMENT — ENCOUNTER SYMPTOMS
SHORTNESS OF BREATH: 0
ABDOMINAL PAIN: 0

## 2024-05-02 NOTE — PROGRESS NOTES
symptomatic bradycardia, I do not think this would preclude him from having surgery although it may mean he is a high likelihood to require pacemaker in the future.  Would avoid AV savana blockers for him.  His feet have good pulses, no think his weakness is vascular related.  Perhaps some sort of nerve compression.  Does not seem like focal weakness to suggest acute neurologic event.  Will have him follow-up after his stress test      No follow-ups on file.          Thank you for allowing me to participate in this patient's care.  Please call or contact me if there are any questions or concerns regarding the above.      Nolan Silvestre III, MD  05/02/24  2:21 PM

## 2024-05-31 ENCOUNTER — HOSPITAL ENCOUNTER (INPATIENT)
Age: 69
LOS: 2 days | Discharge: HOME | DRG: 394 | End: 2024-06-03
Attending: EMERGENCY MEDICINE | Admitting: FAMILY MEDICINE
Payer: MEDICARE

## 2024-05-31 ENCOUNTER — APPOINTMENT (OUTPATIENT)
Dept: CT IMAGING | Age: 69
DRG: 394 | End: 2024-05-31
Payer: MEDICARE

## 2024-05-31 DIAGNOSIS — K62.5 HEMORRHAGE OF RECTUM AND ANUS: Primary | ICD-10-CM

## 2024-05-31 DIAGNOSIS — E87.20 LACTIC ACIDOSIS: ICD-10-CM

## 2024-05-31 LAB
ABO + RH BLD: NORMAL
ALBUMIN SERPL-MCNC: 4 G/DL (ref 3.2–4.6)
ALBUMIN/GLOB SERPL: 1.1 (ref 1–1.9)
ALP SERPL-CCNC: 52 U/L (ref 40–129)
ALT SERPL-CCNC: 51 U/L (ref 12–65)
ANION GAP SERPL CALC-SCNC: 15 MMOL/L (ref 9–18)
AST SERPL-CCNC: 75 U/L (ref 15–37)
BACTERIA URNS QL MICRO: NEGATIVE /HPF
BASOPHILS # BLD: 0 K/UL (ref 0–0.2)
BASOPHILS # BLD: 0.1 K/UL (ref 0–0.2)
BASOPHILS NFR BLD: 0 % (ref 0–2)
BASOPHILS NFR BLD: 0 % (ref 0–2)
BILIRUB SERPL-MCNC: 0.5 MG/DL (ref 0–1.2)
BILIRUB UR QL: NEGATIVE
BLOOD GROUP ANTIBODIES SERPL: NORMAL
BUN SERPL-MCNC: 19 MG/DL (ref 8–23)
CALCIUM SERPL-MCNC: 9.5 MG/DL (ref 8.8–10.2)
CASTS URNS QL MICRO: ABNORMAL /LPF
CHLORIDE SERPL-SCNC: 101 MMOL/L (ref 98–107)
CO2 SERPL-SCNC: 22 MMOL/L (ref 20–28)
CREAT SERPL-MCNC: 1.01 MG/DL (ref 0.8–1.3)
DIFFERENTIAL METHOD BLD: ABNORMAL
DIFFERENTIAL METHOD BLD: NORMAL
EOSINOPHIL # BLD: 0.2 K/UL (ref 0–0.8)
EOSINOPHIL # BLD: 0.2 K/UL (ref 0–0.8)
EOSINOPHIL NFR BLD: 2 % (ref 0.5–7.8)
EOSINOPHIL NFR BLD: 2 % (ref 0.5–7.8)
EPI CELLS #/AREA URNS HPF: ABNORMAL /HPF
ERYTHROCYTE [DISTWIDTH] IN BLOOD BY AUTOMATED COUNT: 12.7 % (ref 11.9–14.6)
ERYTHROCYTE [DISTWIDTH] IN BLOOD BY AUTOMATED COUNT: 12.7 % (ref 11.9–14.6)
ETHANOL SERPL-MCNC: 65 MG/DL (ref 0–0.08)
GLOBULIN SER CALC-MCNC: 3.8 G/DL (ref 2.3–3.5)
GLUCOSE SERPL-MCNC: 97 MG/DL (ref 70–99)
GLUCOSE UR QL STRIP.AUTO: NEGATIVE MG/DL
HCT VFR BLD AUTO: 40.3 % (ref 41.1–50.3)
HCT VFR BLD AUTO: 47.7 % (ref 41.1–50.3)
HGB BLD-MCNC: 13.8 G/DL (ref 13.6–17.2)
HGB BLD-MCNC: 15.9 G/DL (ref 13.6–17.2)
IMM GRANULOCYTES # BLD AUTO: 0 K/UL (ref 0–0.5)
IMM GRANULOCYTES # BLD AUTO: 0 K/UL (ref 0–0.5)
IMM GRANULOCYTES NFR BLD AUTO: 0 % (ref 0–5)
IMM GRANULOCYTES NFR BLD AUTO: 0 % (ref 0–5)
KETONES UR-MCNC: NEGATIVE MG/DL
LACTATE SERPL-SCNC: 2.3 MMOL/L (ref 0.5–2)
LACTATE SERPL-SCNC: 3.3 MMOL/L (ref 0.5–2)
LEUKOCYTE ESTERASE UR QL STRIP: NEGATIVE
LIPASE SERPL-CCNC: 38 U/L (ref 13–60)
LYMPHOCYTES # BLD: 1.8 K/UL (ref 0.5–4.6)
LYMPHOCYTES # BLD: 1.8 K/UL (ref 0.5–4.6)
LYMPHOCYTES NFR BLD: 16 % (ref 13–44)
LYMPHOCYTES NFR BLD: 19 % (ref 13–44)
MCH RBC QN AUTO: 30.9 PG (ref 26.1–32.9)
MCH RBC QN AUTO: 31.1 PG (ref 26.1–32.9)
MCHC RBC AUTO-ENTMCNC: 33.3 G/DL (ref 31.4–35)
MCHC RBC AUTO-ENTMCNC: 34.2 G/DL (ref 31.4–35)
MCV RBC AUTO: 90.8 FL (ref 82–102)
MCV RBC AUTO: 92.8 FL (ref 82–102)
MONOCYTES # BLD: 0.7 K/UL (ref 0.1–1.3)
MONOCYTES # BLD: 0.9 K/UL (ref 0.1–1.3)
MONOCYTES NFR BLD: 8 % (ref 4–12)
MONOCYTES NFR BLD: 8 % (ref 4–12)
NEUTS SEG # BLD: 6.5 K/UL (ref 1.7–8.2)
NEUTS SEG # BLD: 8.2 K/UL (ref 1.7–8.2)
NEUTS SEG NFR BLD: 71 % (ref 43–78)
NEUTS SEG NFR BLD: 74 % (ref 43–78)
NITRITE UR QL: NEGATIVE
NRBC # BLD: 0 K/UL (ref 0–0.2)
NRBC # BLD: 0 K/UL (ref 0–0.2)
PH UR: 5.5 (ref 5–9)
PLATELET # BLD AUTO: 181 K/UL (ref 150–450)
PLATELET # BLD AUTO: 193 K/UL (ref 150–450)
PMV BLD AUTO: 10 FL (ref 9.4–12.3)
PMV BLD AUTO: 10.2 FL (ref 9.4–12.3)
POTASSIUM SERPL-SCNC: ABNORMAL MMOL/L (ref 3.5–5.1)
PROT SERPL-MCNC: 7.8 G/DL (ref 6.3–8.2)
PROT UR QL: 100 MG/DL
RBC # BLD AUTO: 4.44 M/UL (ref 4.23–5.6)
RBC # BLD AUTO: 5.14 M/UL (ref 4.23–5.6)
RBC # UR STRIP: ABNORMAL
RBC #/AREA URNS HPF: ABNORMAL /HPF
SERVICE CMNT-IMP: ABNORMAL
SODIUM SERPL-SCNC: 137 MMOL/L (ref 136–145)
SP GR UR: >1.03 (ref 1–1.02)
SPECIMEN EXP DATE BLD: NORMAL
TROPONIN T SERPL HS-MCNC: 14 NG/L (ref 0–22)
TROPONIN T SERPL HS-MCNC: 15 NG/L (ref 0–22)
UROBILINOGEN UR QL: 0.2 EU/DL (ref 0.2–1)
WBC # BLD AUTO: 11.2 K/UL (ref 4.3–11.1)
WBC # BLD AUTO: 9.2 K/UL (ref 4.3–11.1)
WBC URNS QL MICRO: ABNORMAL /HPF

## 2024-05-31 PROCEDURE — 74176 CT ABD & PELVIS W/O CONTRAST: CPT

## 2024-05-31 PROCEDURE — 83605 ASSAY OF LACTIC ACID: CPT

## 2024-05-31 PROCEDURE — 80053 COMPREHEN METABOLIC PANEL: CPT

## 2024-05-31 PROCEDURE — 96365 THER/PROPH/DIAG IV INF INIT: CPT

## 2024-05-31 PROCEDURE — 86850 RBC ANTIBODY SCREEN: CPT

## 2024-05-31 PROCEDURE — 81003 URINALYSIS AUTO W/O SCOPE: CPT

## 2024-05-31 PROCEDURE — 6370000000 HC RX 637 (ALT 250 FOR IP): Performed by: EMERGENCY MEDICINE

## 2024-05-31 PROCEDURE — 85025 COMPLETE CBC W/AUTO DIFF WBC: CPT

## 2024-05-31 PROCEDURE — 82077 ASSAY SPEC XCP UR&BREATH IA: CPT

## 2024-05-31 PROCEDURE — 81001 URINALYSIS AUTO W/SCOPE: CPT

## 2024-05-31 PROCEDURE — 99285 EMERGENCY DEPT VISIT HI MDM: CPT

## 2024-05-31 PROCEDURE — 87040 BLOOD CULTURE FOR BACTERIA: CPT

## 2024-05-31 PROCEDURE — 86900 BLOOD TYPING SEROLOGIC ABO: CPT

## 2024-05-31 PROCEDURE — 84484 ASSAY OF TROPONIN QUANT: CPT

## 2024-05-31 PROCEDURE — 83690 ASSAY OF LIPASE: CPT

## 2024-05-31 PROCEDURE — 86901 BLOOD TYPING SEROLOGIC RH(D): CPT

## 2024-05-31 PROCEDURE — 2580000003 HC RX 258: Performed by: EMERGENCY MEDICINE

## 2024-05-31 RX ORDER — HYDROCODONE BITARTRATE AND ACETAMINOPHEN 5; 325 MG/1; MG/1
1 TABLET ORAL ONCE
Status: COMPLETED | OUTPATIENT
Start: 2024-05-31 | End: 2024-05-31

## 2024-05-31 RX ORDER — 0.9 % SODIUM CHLORIDE 0.9 %
1000 INTRAVENOUS SOLUTION INTRAVENOUS ONCE
Status: DISCONTINUED | OUTPATIENT
Start: 2024-05-31 | End: 2024-05-31

## 2024-05-31 RX ORDER — 0.9 % SODIUM CHLORIDE 0.9 %
1000 INTRAVENOUS SOLUTION INTRAVENOUS ONCE
Status: COMPLETED | OUTPATIENT
Start: 2024-05-31 | End: 2024-06-01

## 2024-05-31 RX ORDER — 0.9 % SODIUM CHLORIDE 0.9 %
2000 INTRAVENOUS SOLUTION INTRAVENOUS ONCE
Status: COMPLETED | OUTPATIENT
Start: 2024-05-31 | End: 2024-05-31

## 2024-05-31 RX ADMIN — HYDROCODONE BITARTRATE AND ACETAMINOPHEN 1 TABLET: 5; 325 TABLET ORAL at 22:56

## 2024-05-31 RX ADMIN — SODIUM CHLORIDE 1000 ML: 9 INJECTION, SOLUTION INTRAVENOUS at 22:42

## 2024-05-31 RX ADMIN — SODIUM CHLORIDE 2000 ML: 9 INJECTION, SOLUTION INTRAVENOUS at 19:09

## 2024-05-31 ASSESSMENT — PAIN DESCRIPTION - LOCATION
LOCATION: ABDOMEN
LOCATION: ABDOMEN

## 2024-05-31 ASSESSMENT — PAIN SCALES - GENERAL
PAINLEVEL_OUTOF10: 7
PAINLEVEL_OUTOF10: 7

## 2024-05-31 ASSESSMENT — PAIN DESCRIPTION - DESCRIPTORS
DESCRIPTORS: ACHING
DESCRIPTORS: BURNING

## 2024-05-31 ASSESSMENT — LIFESTYLE VARIABLES
HOW MANY STANDARD DRINKS CONTAINING ALCOHOL DO YOU HAVE ON A TYPICAL DAY: 3 OR 4
HOW OFTEN DO YOU HAVE A DRINK CONTAINING ALCOHOL: 4 OR MORE TIMES A WEEK

## 2024-05-31 ASSESSMENT — PAIN - FUNCTIONAL ASSESSMENT: PAIN_FUNCTIONAL_ASSESSMENT: 0-10

## 2024-05-31 NOTE — ED TRIAGE NOTES
Pt brought in by EMS from home c/o abd pain/burning and 1 episode of dark \"maroon\" colored stool. (-) N/V/D. Pt reports symptoms started this morning. Pt reports what appeared to be a blood clot in his stool as well. Pt denies thinners. Pt reports hx of IBS.

## 2024-05-31 NOTE — ED PROVIDER NOTES
descending and sigmoid colon which may   be secondary to colitis.      Ectasia of the distal abdominal aorta.                         No results for input(s): \"COVID19\" in the last 72 hours.    Voice dictation software was used during the making of this note.  This software is not perfect and grammatical and other typographical errors may be present.  This note has not been completely proofread for errors.       Rhea Pollack MD  05/31/24 5840       Rhea Pollack MD  05/31/24 4554

## 2024-06-01 PROBLEM — K92.2 LOWER GI BLEEDING: Status: ACTIVE | Noted: 2024-06-01

## 2024-06-01 PROBLEM — K52.9 COLITIS: Status: ACTIVE | Noted: 2024-06-01

## 2024-06-01 PROBLEM — K92.2 ACUTE LOWER GI BLEEDING: Status: ACTIVE | Noted: 2024-06-01

## 2024-06-01 LAB
ANION GAP SERPL CALC-SCNC: 14 MMOL/L (ref 9–18)
BUN SERPL-MCNC: 11 MG/DL (ref 8–23)
CALCIUM SERPL-MCNC: 8.8 MG/DL (ref 8.8–10.2)
CHLORIDE SERPL-SCNC: 100 MMOL/L (ref 98–107)
CO2 SERPL-SCNC: 22 MMOL/L (ref 20–28)
CREAT SERPL-MCNC: 0.75 MG/DL (ref 0.8–1.3)
GLUCOSE SERPL-MCNC: 103 MG/DL (ref 70–99)
HCT VFR BLD AUTO: 41.4 % (ref 41.1–50.3)
HCT VFR BLD AUTO: 41.9 % (ref 41.1–50.3)
HCT VFR BLD AUTO: 42 % (ref 41.1–50.3)
HGB BLD-MCNC: 14.1 G/DL (ref 13.6–17.2)
HGB BLD-MCNC: 14.1 G/DL (ref 13.6–17.2)
HGB BLD-MCNC: 14.3 G/DL (ref 13.6–17.2)
IRON SERPL-MCNC: 63 UG/DL (ref 35–100)
LACTATE SERPL-SCNC: 1.1 MMOL/L (ref 0.5–2)
MAGNESIUM SERPL-MCNC: 1.6 MG/DL (ref 1.8–2.4)
POTASSIUM SERPL-SCNC: 3.4 MMOL/L (ref 3.5–5.1)
SODIUM SERPL-SCNC: 137 MMOL/L (ref 136–145)
TRANSFERRIN SERPL-MCNC: 218 MG/DL (ref 200–360)

## 2024-06-01 PROCEDURE — 6360000002 HC RX W HCPCS: Performed by: EMERGENCY MEDICINE

## 2024-06-01 PROCEDURE — 87427 SHIGA-LIKE TOXIN AG IA: CPT

## 2024-06-01 PROCEDURE — 2580000003 HC RX 258: Performed by: EMERGENCY MEDICINE

## 2024-06-01 PROCEDURE — 6370000000 HC RX 637 (ALT 250 FOR IP): Performed by: FAMILY MEDICINE

## 2024-06-01 PROCEDURE — 85014 HEMATOCRIT: CPT

## 2024-06-01 PROCEDURE — 6360000002 HC RX W HCPCS: Performed by: HOSPITALIST

## 2024-06-01 PROCEDURE — 83540 ASSAY OF IRON: CPT

## 2024-06-01 PROCEDURE — 6360000002 HC RX W HCPCS: Performed by: FAMILY MEDICINE

## 2024-06-01 PROCEDURE — 2580000003 HC RX 258: Performed by: FAMILY MEDICINE

## 2024-06-01 PROCEDURE — 6370000000 HC RX 637 (ALT 250 FOR IP): Performed by: HOSPITALIST

## 2024-06-01 PROCEDURE — 80048 BASIC METABOLIC PNL TOTAL CA: CPT

## 2024-06-01 PROCEDURE — C9113 INJ PANTOPRAZOLE SODIUM, VIA: HCPCS | Performed by: FAMILY MEDICINE

## 2024-06-01 PROCEDURE — A4216 STERILE WATER/SALINE, 10 ML: HCPCS | Performed by: FAMILY MEDICINE

## 2024-06-01 PROCEDURE — 87899 AGENT NOS ASSAY W/OPTIC: CPT

## 2024-06-01 PROCEDURE — 87046 STOOL CULTR AEROBIC BACT EA: CPT

## 2024-06-01 PROCEDURE — 83735 ASSAY OF MAGNESIUM: CPT

## 2024-06-01 PROCEDURE — 85018 HEMOGLOBIN: CPT

## 2024-06-01 PROCEDURE — 87045 FECES CULTURE AEROBIC BACT: CPT

## 2024-06-01 PROCEDURE — 36415 COLL VENOUS BLD VENIPUNCTURE: CPT

## 2024-06-01 PROCEDURE — 87493 C DIFF AMPLIFIED PROBE: CPT

## 2024-06-01 PROCEDURE — 99222 1ST HOSP IP/OBS MODERATE 55: CPT | Performed by: STUDENT IN AN ORGANIZED HEALTH CARE EDUCATION/TRAINING PROGRAM

## 2024-06-01 PROCEDURE — 1100000003 HC PRIVATE W/ TELEMETRY

## 2024-06-01 PROCEDURE — 84466 ASSAY OF TRANSFERRIN: CPT

## 2024-06-01 RX ORDER — ONDANSETRON 4 MG/1
4 TABLET, ORALLY DISINTEGRATING ORAL EVERY 8 HOURS PRN
Status: DISCONTINUED | OUTPATIENT
Start: 2024-06-01 | End: 2024-06-03 | Stop reason: HOSPADM

## 2024-06-01 RX ORDER — SODIUM CHLORIDE 9 MG/ML
INJECTION, SOLUTION INTRAVENOUS PRN
Status: DISCONTINUED | OUTPATIENT
Start: 2024-06-01 | End: 2024-06-03 | Stop reason: HOSPADM

## 2024-06-01 RX ORDER — FLUTICASONE PROPIONATE 50 MCG
1 SPRAY, SUSPENSION (ML) NASAL DAILY
Status: DISCONTINUED | OUTPATIENT
Start: 2024-06-01 | End: 2024-06-03 | Stop reason: HOSPADM

## 2024-06-01 RX ORDER — SODIUM CHLORIDE 0.9 % (FLUSH) 0.9 %
5-40 SYRINGE (ML) INJECTION PRN
Status: DISCONTINUED | OUTPATIENT
Start: 2024-06-01 | End: 2024-06-03 | Stop reason: HOSPADM

## 2024-06-01 RX ORDER — POTASSIUM CHLORIDE 7.45 MG/ML
10 INJECTION INTRAVENOUS PRN
Status: DISCONTINUED | OUTPATIENT
Start: 2024-06-01 | End: 2024-06-03 | Stop reason: HOSPADM

## 2024-06-01 RX ORDER — SODIUM CHLORIDE 0.9 % (FLUSH) 0.9 %
5-40 SYRINGE (ML) INJECTION EVERY 12 HOURS SCHEDULED
Status: DISCONTINUED | OUTPATIENT
Start: 2024-06-01 | End: 2024-06-03 | Stop reason: HOSPADM

## 2024-06-01 RX ORDER — ACETAMINOPHEN 500 MG
500 TABLET ORAL EVERY 6 HOURS PRN
Status: DISCONTINUED | OUTPATIENT
Start: 2024-06-01 | End: 2024-06-01 | Stop reason: SDUPTHER

## 2024-06-01 RX ORDER — ACETAMINOPHEN 650 MG/1
650 SUPPOSITORY RECTAL EVERY 6 HOURS PRN
Status: DISCONTINUED | OUTPATIENT
Start: 2024-06-01 | End: 2024-06-03 | Stop reason: HOSPADM

## 2024-06-01 RX ORDER — ACETAMINOPHEN 325 MG/1
650 TABLET ORAL EVERY 6 HOURS PRN
Status: DISCONTINUED | OUTPATIENT
Start: 2024-06-01 | End: 2024-06-03 | Stop reason: HOSPADM

## 2024-06-01 RX ORDER — MAGNESIUM SULFATE IN WATER 40 MG/ML
2000 INJECTION, SOLUTION INTRAVENOUS ONCE
Status: COMPLETED | OUTPATIENT
Start: 2024-06-01 | End: 2024-06-01

## 2024-06-01 RX ORDER — MAGNESIUM SULFATE IN WATER 40 MG/ML
2000 INJECTION, SOLUTION INTRAVENOUS PRN
Status: DISCONTINUED | OUTPATIENT
Start: 2024-06-01 | End: 2024-06-03 | Stop reason: HOSPADM

## 2024-06-01 RX ORDER — AMLODIPINE BESYLATE 5 MG/1
5 TABLET ORAL DAILY
Status: DISCONTINUED | OUTPATIENT
Start: 2024-06-01 | End: 2024-06-03 | Stop reason: HOSPADM

## 2024-06-01 RX ORDER — LISINOPRIL 20 MG/1
20 TABLET ORAL DAILY
Status: DISCONTINUED | OUTPATIENT
Start: 2024-06-01 | End: 2024-06-03 | Stop reason: HOSPADM

## 2024-06-01 RX ORDER — MORPHINE SULFATE 2 MG/ML
2 INJECTION, SOLUTION INTRAMUSCULAR; INTRAVENOUS EVERY 4 HOURS PRN
Status: DISCONTINUED | OUTPATIENT
Start: 2024-06-01 | End: 2024-06-03 | Stop reason: HOSPADM

## 2024-06-01 RX ORDER — THIAMINE HYDROCHLORIDE 100 MG/ML
250 INJECTION, SOLUTION INTRAMUSCULAR; INTRAVENOUS DAILY
Status: DISCONTINUED | OUTPATIENT
Start: 2024-06-04 | End: 2024-06-03 | Stop reason: HOSPADM

## 2024-06-01 RX ORDER — LORAZEPAM 1 MG/1
2 TABLET ORAL
Status: DISCONTINUED | OUTPATIENT
Start: 2024-06-01 | End: 2024-06-03 | Stop reason: HOSPADM

## 2024-06-01 RX ORDER — TIZANIDINE 2 MG/1
4 TABLET ORAL EVERY 8 HOURS PRN
Status: DISCONTINUED | OUTPATIENT
Start: 2024-06-01 | End: 2024-06-03 | Stop reason: HOSPADM

## 2024-06-01 RX ORDER — HYDRALAZINE HYDROCHLORIDE 20 MG/ML
10 INJECTION INTRAMUSCULAR; INTRAVENOUS EVERY 6 HOURS PRN
Status: DISCONTINUED | OUTPATIENT
Start: 2024-06-01 | End: 2024-06-03 | Stop reason: HOSPADM

## 2024-06-01 RX ORDER — LANOLIN ALCOHOL/MO/W.PET/CERES
100 CREAM (GRAM) TOPICAL DAILY
Status: DISCONTINUED | OUTPATIENT
Start: 2024-06-09 | End: 2024-06-03 | Stop reason: HOSPADM

## 2024-06-01 RX ORDER — POTASSIUM CHLORIDE 20 MEQ/1
40 TABLET, EXTENDED RELEASE ORAL ONCE
Status: COMPLETED | OUTPATIENT
Start: 2024-06-01 | End: 2024-06-01

## 2024-06-01 RX ORDER — LORAZEPAM 1 MG/1
4 TABLET ORAL
Status: DISCONTINUED | OUTPATIENT
Start: 2024-06-01 | End: 2024-06-03 | Stop reason: HOSPADM

## 2024-06-01 RX ORDER — LANOLIN ALCOHOL/MO/W.PET/CERES
100 CREAM (GRAM) TOPICAL DAILY
Status: DISCONTINUED | OUTPATIENT
Start: 2024-06-08 | End: 2024-06-01 | Stop reason: SDUPTHER

## 2024-06-01 RX ORDER — LORAZEPAM 1 MG/1
3 TABLET ORAL
Status: DISCONTINUED | OUTPATIENT
Start: 2024-06-01 | End: 2024-06-03 | Stop reason: HOSPADM

## 2024-06-01 RX ORDER — ONDANSETRON 2 MG/ML
4 INJECTION INTRAMUSCULAR; INTRAVENOUS EVERY 6 HOURS PRN
Status: DISCONTINUED | OUTPATIENT
Start: 2024-06-01 | End: 2024-06-03 | Stop reason: HOSPADM

## 2024-06-01 RX ORDER — SODIUM CHLORIDE 9 MG/ML
INJECTION, SOLUTION INTRAVENOUS CONTINUOUS
Status: DISCONTINUED | OUTPATIENT
Start: 2024-06-01 | End: 2024-06-02

## 2024-06-01 RX ORDER — POTASSIUM CHLORIDE 20 MEQ/1
40 TABLET, EXTENDED RELEASE ORAL PRN
Status: DISCONTINUED | OUTPATIENT
Start: 2024-06-01 | End: 2024-06-03 | Stop reason: HOSPADM

## 2024-06-01 RX ORDER — MULTIVITAMIN WITH IRON
1 TABLET ORAL DAILY
Status: DISCONTINUED | OUTPATIENT
Start: 2024-06-01 | End: 2024-06-03 | Stop reason: HOSPADM

## 2024-06-01 RX ORDER — LORAZEPAM 1 MG/1
1 TABLET ORAL
Status: DISCONTINUED | OUTPATIENT
Start: 2024-06-01 | End: 2024-06-03 | Stop reason: HOSPADM

## 2024-06-01 RX ADMIN — SODIUM CHLORIDE: 9 INJECTION, SOLUTION INTRAVENOUS at 20:35

## 2024-06-01 RX ADMIN — SODIUM CHLORIDE: 9 INJECTION, SOLUTION INTRAVENOUS at 04:01

## 2024-06-01 RX ADMIN — PANTOPRAZOLE SODIUM 40 MG: 40 INJECTION, POWDER, FOR SOLUTION INTRAVENOUS at 05:45

## 2024-06-01 RX ADMIN — TIZANIDINE 4 MG: 2 TABLET ORAL at 14:19

## 2024-06-01 RX ADMIN — PIPERACILLIN AND TAZOBACTAM 4500 MG: 4; .5 INJECTION, POWDER, LYOPHILIZED, FOR SOLUTION INTRAVENOUS at 00:00

## 2024-06-01 RX ADMIN — HYDRALAZINE HYDROCHLORIDE 10 MG: 20 INJECTION INTRAMUSCULAR; INTRAVENOUS at 10:40

## 2024-06-01 RX ADMIN — SODIUM CHLORIDE, PRESERVATIVE FREE 10 ML: 5 INJECTION INTRAVENOUS at 10:46

## 2024-06-01 RX ADMIN — FLUTICASONE PROPIONATE 1 SPRAY: 50 SPRAY, METERED NASAL at 07:48

## 2024-06-01 RX ADMIN — LISINOPRIL 20 MG: 20 TABLET ORAL at 07:43

## 2024-06-01 RX ADMIN — PIPERACILLIN AND TAZOBACTAM 3375 MG: 3; .375 INJECTION, POWDER, LYOPHILIZED, FOR SOLUTION INTRAVENOUS at 05:45

## 2024-06-01 RX ADMIN — PIPERACILLIN AND TAZOBACTAM 3375 MG: 3; .375 INJECTION, POWDER, LYOPHILIZED, FOR SOLUTION INTRAVENOUS at 15:47

## 2024-06-01 RX ADMIN — AMLODIPINE BESYLATE 5 MG: 5 TABLET ORAL at 07:43

## 2024-06-01 RX ADMIN — PIPERACILLIN AND TAZOBACTAM 3375 MG: 3; .375 INJECTION, POWDER, LYOPHILIZED, FOR SOLUTION INTRAVENOUS at 22:14

## 2024-06-01 RX ADMIN — THIAMINE HYDROCHLORIDE 500 MG: 100 INJECTION, SOLUTION INTRAMUSCULAR; INTRAVENOUS at 11:39

## 2024-06-01 RX ADMIN — PANTOPRAZOLE SODIUM 40 MG: 40 INJECTION, POWDER, FOR SOLUTION INTRAVENOUS at 17:42

## 2024-06-01 RX ADMIN — POTASSIUM CHLORIDE 40 MEQ: 1500 TABLET, EXTENDED RELEASE ORAL at 09:03

## 2024-06-01 RX ADMIN — SODIUM CHLORIDE, PRESERVATIVE FREE 10 ML: 5 INJECTION INTRAVENOUS at 07:44

## 2024-06-01 RX ADMIN — B-COMPLEX W/ C & FOLIC ACID TAB 1 TABLET: TAB at 11:36

## 2024-06-01 RX ADMIN — THIAMINE HYDROCHLORIDE 500 MG: 100 INJECTION, SOLUTION INTRAMUSCULAR; INTRAVENOUS at 20:37

## 2024-06-01 RX ADMIN — MAGNESIUM SULFATE HEPTAHYDRATE 2000 MG: 40 INJECTION, SOLUTION INTRAVENOUS at 09:07

## 2024-06-01 ASSESSMENT — PAIN SCALES - GENERAL
PAINLEVEL_OUTOF10: 0
PAINLEVEL_OUTOF10: 0
PAINLEVEL_OUTOF10: 4

## 2024-06-01 ASSESSMENT — PAIN DESCRIPTION - DESCRIPTORS: DESCRIPTORS: ACHING

## 2024-06-01 ASSESSMENT — PAIN DESCRIPTION - LOCATION: LOCATION: BACK

## 2024-06-01 NOTE — H&P
Hospitalist History and Physical   Admit Date:  2024  4:45 PM   Name:  Uche Gagnon   Age:  68 y.o.  Sex:  male  :  1955   MRN:  [id number]   Room:  [facility]/    Presenting/Chief Complaint: Rectal Bleeding     BRBPR x 1 day.    Reason(s) for Admission: Hemorrhage of rectum and anus [K62.5]  Lactic acidosis [E87.20]  Lower GI bleeding [K92.2]     History of Present Illness:     Uche Gagnon is a 68 y.o. male with past medical history of IBS with constipation and diarrhea, HTN, GERD, Hx alcohol, Tobacco & marijuana use who presented to the ED  with Hx that he began the prior late evening to have diffuse abdomen discomfort with some crampy and some sharp components. It felt different that his IBS, then at 7am he noted BRBPR with/after an otherwise normal BM, followed by 2 more episodes of dark then brighter red stools.  He has had 2 more episodes in the ED. He has never had bleeding like this before.      Assessment & Plan:     Principal Problem:    Acute lower GI bleeding  Plan: Admit, monitor H/H, Consult GI if or CTA Abd and Ir consult depending on the situation as if brisk bleeding persists endoscopy may not be possible.,   Active Problems:    Essential hypertension  Plan: monitor BP, continue home medicines, add prn hydralizine    Colitis  Plan: IV antibiotics, IVF's , stool cx if has another stool     Ethanol use hx  Plan: CIWA protocol ordered      PT/OT evals ordered?  Therapy evals ordered  Diet: ADULT DIET; Clear Liquid  VTE prophylaxis: SCD's   Code status: Full Code      Non-peripheral Lines and Tubes (if present):       Tobacco History    Smoking Status  Every Day Current Packs/Day  0.3 packs/day Average Packs/Day  0.3 packs/day for 10.0 years (2.5 ttl pk-yrs) Smoking Tobacco Type  Cigarettes   Pack Year History  Packs/Day From To Years   0.25   10.0   Smokeless Tobacco Use  Never   Alcohol History    Alcohol Use Status  Yes Drinks/Week  7 Shots of liquor per week

## 2024-06-01 NOTE — CONSULTS
Uche Gagnon is 68 y.o. y/o male here for initial evaluation.     PMH of IBS, GERD, THC use presented with abd pain and brbpr.     CT A&P w/o contrast:   IMPRESSION:  Fatty liver.     Fluid-filled colonic loops involving the descending and sigmoid colon which may  be secondary to colitis.    Stool studies pending.     Lab Results   Component Value Date    HGB 14.1 06/01/2024    WBC 11.2 (H) 05/31/2024     05/31/2024    MCV 90.8 05/31/2024    IRON 63 06/01/2024    CREATININE 0.75 (L) 06/01/2024    ALT 51 05/31/2024    AST 75 (H) 05/31/2024       Past Medical History:   Diagnosis Date    Cerebral artery occlusion with cerebral infarction (HCC) 07/03/2021    admitted to hospital \"pt states no stroke only his nerves in back\"    Chronic frontal sinusitis 09/13/2022    Hx of echocardiogram 07/06/2021    Hypertension     Impacted cerumen of right ear 09/13/2022    S/P laminectomy with spinal fusion 09/19/2022    S/P lumbar fusion 09/21/2022     Past Surgical History:   Procedure Laterality Date    LUMBAR FUSION N/A 9/19/2022    LUMBAR 4- SACRAL 1  LAMINECTOMY FUSION WITH  TRANSFORAMINAL LUMBAR INTERBODY FUSION POSTERIOR ONE LEVEL performed by GILLIAN Britton III, MD at [facility] MAIN OR    NEUROLOGICAL SURGERY      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      ORTHOPEDIC SURGERY      left knee    SKIN GRAFT      right hand and lower arm     Family History   Problem Relation Age of Onset    Diabetes Sister     Stroke Father     Diabetes Brother     Cancer Neg Hx     Diabetes Mother         borderline    Heart Disease Mother         chf    Heart Disease Sister         MI  over 50     Social History     Tobacco Use    Smoking status: Every Day     Current packs/day: 0.25     Average packs/day: 0.3 packs/day for 10.0 years (2.5 ttl pk-yrs)     Types: Cigarettes    Smokeless tobacco: Never   Vaping Use    Vaping Use: Never used   Substance Use Topics    Alcohol use: Yes     Alcohol/week: 7.0 standard drinks of alcohol     Types: 7 Shots

## 2024-06-01 NOTE — ED NOTES
TRANSFER - OUT REPORT:    Verbal report given to Debbie DODGE on Uche Gagnon  being transferred to [facility] for routine progression of patient care       Report consisted of patient's Situation, Background, Assessment and   Recommendations(SBAR).     Information from the following report(s) ED SBAR was reviewed with the receiving nurse.    Byars Fall Assessment:    Presents to emergency department  because of falls (Syncope, seizure, or loss of consciousness): No  Age > 70: No  Altered Mental Status, Intoxication with alcohol or substance confusion (Disorientation, impaired judgment, poor safety awaremess, or inability to follow instructions): No  Impaired Mobility: Ambulates or transfers with assistive devices or assistance; Unable to ambulate or transer.: Yes  Nursing Judgement: Yes          Lines:   Peripheral IV 05/31/24 Left Antecubital (Active)   Site Assessment Clean, dry & intact 05/31/24 2039   Line Status Flushed 05/31/24 2039   Line Care Connections checked and tightened 05/31/24 2039   Phlebitis Assessment No symptoms 05/31/24 2039   Infiltration Assessment 0 05/31/24 2039   Alcohol Cap Used No 05/31/24 2039   Dressing Status Clean, dry & intact 05/31/24 2039   Dressing Type Transparent 05/31/24 2039       Peripheral IV 06/01/24 Right Antecubital (Active)   Site Assessment Clean, dry & intact 06/01/24 0037   Line Status Flushed 06/01/24 0037   Line Care Connections checked and tightened 06/01/24 0037   Phlebitis Assessment No symptoms 06/01/24 0037   Infiltration Assessment 0 06/01/24 0037   Alcohol Cap Used No 06/01/24 0037   Dressing Status Clean, dry & intact 06/01/24 0037   Dressing Type Transparent 06/01/24 0037        Opportunity for questions and clarification was provided.      Patient transported with:  Registered Nurse           Indu Eric RN  06/01/24 9584

## 2024-06-02 LAB
ANION GAP SERPL CALC-SCNC: 11 MMOL/L (ref 9–18)
APTT PPP: 29.3 SEC (ref 23.3–37.4)
BASOPHILS # BLD: 0 K/UL (ref 0–0.2)
BASOPHILS NFR BLD: 0 % (ref 0–2)
BUN SERPL-MCNC: 6 MG/DL (ref 8–23)
C. DIFFICILE TOXIN MOLECULAR: NEGATIVE
CALCIUM SERPL-MCNC: 8.9 MG/DL (ref 8.8–10.2)
CHLORIDE SERPL-SCNC: 101 MMOL/L (ref 98–107)
CO2 SERPL-SCNC: 22 MMOL/L (ref 20–28)
CREAT SERPL-MCNC: 0.83 MG/DL (ref 0.8–1.3)
DIFFERENTIAL METHOD BLD: ABNORMAL
EOSINOPHIL # BLD: 0.2 K/UL (ref 0–0.8)
EOSINOPHIL NFR BLD: 1 % (ref 0.5–7.8)
ERYTHROCYTE [DISTWIDTH] IN BLOOD BY AUTOMATED COUNT: 12.3 % (ref 11.9–14.6)
GLUCOSE SERPL-MCNC: 117 MG/DL (ref 70–99)
HCT VFR BLD AUTO: 40.6 % (ref 41.1–50.3)
HCT VFR BLD AUTO: 41.2 % (ref 41.1–50.3)
HCT VFR BLD AUTO: 41.5 % (ref 41.1–50.3)
HCT VFR BLD AUTO: 41.8 % (ref 41.1–50.3)
HGB BLD-MCNC: 13.5 G/DL (ref 13.6–17.2)
HGB BLD-MCNC: 13.8 G/DL (ref 13.6–17.2)
HGB BLD-MCNC: 14.1 G/DL (ref 13.6–17.2)
HGB BLD-MCNC: 14.3 G/DL (ref 13.6–17.2)
IMM GRANULOCYTES # BLD AUTO: 0.1 K/UL (ref 0–0.5)
IMM GRANULOCYTES NFR BLD AUTO: 1 % (ref 0–5)
INR PPP: 1
IRON SATN MFR SERPL: 26 % (ref 20–50)
IRON SERPL-MCNC: 68 UG/DL (ref 35–100)
LYMPHOCYTES # BLD: 1.8 K/UL (ref 0.5–4.6)
LYMPHOCYTES NFR BLD: 16 % (ref 13–44)
MAGNESIUM SERPL-MCNC: 1.8 MG/DL (ref 1.8–2.4)
MCH RBC QN AUTO: 30.9 PG (ref 26.1–32.9)
MCHC RBC AUTO-ENTMCNC: 34 G/DL (ref 31.4–35)
MCV RBC AUTO: 91 FL (ref 82–102)
MONOCYTES # BLD: 0.9 K/UL (ref 0.1–1.3)
MONOCYTES NFR BLD: 8 % (ref 4–12)
NEUTS SEG # BLD: 8.4 K/UL (ref 1.7–8.2)
NEUTS SEG NFR BLD: 74 % (ref 43–78)
NRBC # BLD: 0 K/UL (ref 0–0.2)
PLATELET # BLD AUTO: 150 K/UL (ref 150–450)
PMV BLD AUTO: 9.9 FL (ref 9.4–12.3)
POTASSIUM SERPL-SCNC: 3.4 MMOL/L (ref 3.5–5.1)
PROTHROMBIN TIME: 13.3 SEC (ref 11.3–14.9)
RBC # BLD AUTO: 4.56 M/UL (ref 4.23–5.6)
SODIUM SERPL-SCNC: 134 MMOL/L (ref 136–145)
TIBC SERPL-MCNC: 258 UG/DL (ref 240–450)
UIBC SERPL-MCNC: 190 UG/DL (ref 112–347)
WBC # BLD AUTO: 11.3 K/UL (ref 4.3–11.1)

## 2024-06-02 PROCEDURE — C9113 INJ PANTOPRAZOLE SODIUM, VIA: HCPCS | Performed by: FAMILY MEDICINE

## 2024-06-02 PROCEDURE — A4216 STERILE WATER/SALINE, 10 ML: HCPCS | Performed by: FAMILY MEDICINE

## 2024-06-02 PROCEDURE — 80048 BASIC METABOLIC PNL TOTAL CA: CPT

## 2024-06-02 PROCEDURE — 85610 PROTHROMBIN TIME: CPT

## 2024-06-02 PROCEDURE — 2580000003 HC RX 258: Performed by: HOSPITALIST

## 2024-06-02 PROCEDURE — 2580000003 HC RX 258: Performed by: FAMILY MEDICINE

## 2024-06-02 PROCEDURE — 1100000003 HC PRIVATE W/ TELEMETRY

## 2024-06-02 PROCEDURE — 6370000000 HC RX 637 (ALT 250 FOR IP): Performed by: HOSPITALIST

## 2024-06-02 PROCEDURE — 85025 COMPLETE CBC W/AUTO DIFF WBC: CPT

## 2024-06-02 PROCEDURE — 83540 ASSAY OF IRON: CPT

## 2024-06-02 PROCEDURE — 83735 ASSAY OF MAGNESIUM: CPT

## 2024-06-02 PROCEDURE — 85018 HEMOGLOBIN: CPT

## 2024-06-02 PROCEDURE — 36415 COLL VENOUS BLD VENIPUNCTURE: CPT

## 2024-06-02 PROCEDURE — 83550 IRON BINDING TEST: CPT

## 2024-06-02 PROCEDURE — 85014 HEMATOCRIT: CPT

## 2024-06-02 PROCEDURE — 6370000000 HC RX 637 (ALT 250 FOR IP): Performed by: FAMILY MEDICINE

## 2024-06-02 PROCEDURE — 6360000002 HC RX W HCPCS: Performed by: FAMILY MEDICINE

## 2024-06-02 PROCEDURE — 85730 THROMBOPLASTIN TIME PARTIAL: CPT

## 2024-06-02 RX ORDER — LANOLIN ALCOHOL/MO/W.PET/CERES
400 CREAM (GRAM) TOPICAL 2 TIMES DAILY
Status: DISCONTINUED | OUTPATIENT
Start: 2024-06-02 | End: 2024-06-03 | Stop reason: HOSPADM

## 2024-06-02 RX ORDER — POTASSIUM CHLORIDE 20 MEQ/1
40 TABLET, EXTENDED RELEASE ORAL ONCE
Status: COMPLETED | OUTPATIENT
Start: 2024-06-02 | End: 2024-06-02

## 2024-06-02 RX ORDER — SIMETHICONE 80 MG
80 TABLET,CHEWABLE ORAL EVERY 6 HOURS PRN
Status: DISCONTINUED | OUTPATIENT
Start: 2024-06-02 | End: 2024-06-03 | Stop reason: HOSPADM

## 2024-06-02 RX ADMIN — FLUTICASONE PROPIONATE 1 SPRAY: 50 SPRAY, METERED NASAL at 09:31

## 2024-06-02 RX ADMIN — SODIUM CHLORIDE, PRESERVATIVE FREE 5 ML: 5 INJECTION INTRAVENOUS at 22:30

## 2024-06-02 RX ADMIN — LISINOPRIL 20 MG: 20 TABLET ORAL at 09:24

## 2024-06-02 RX ADMIN — PIPERACILLIN AND TAZOBACTAM 3375 MG: 3; .375 INJECTION, POWDER, LYOPHILIZED, FOR SOLUTION INTRAVENOUS at 14:07

## 2024-06-02 RX ADMIN — SIMETHICONE 80 MG: 80 TABLET, CHEWABLE ORAL at 22:24

## 2024-06-02 RX ADMIN — SODIUM CHLORIDE, PRESERVATIVE FREE 10 ML: 5 INJECTION INTRAVENOUS at 09:24

## 2024-06-02 RX ADMIN — B-COMPLEX W/ C & FOLIC ACID TAB 1 TABLET: TAB at 09:24

## 2024-06-02 RX ADMIN — POTASSIUM BICARBONATE 40 MEQ: 782 TABLET, EFFERVESCENT ORAL at 05:35

## 2024-06-02 RX ADMIN — THIAMINE HYDROCHLORIDE 500 MG: 100 INJECTION, SOLUTION INTRAMUSCULAR; INTRAVENOUS at 02:36

## 2024-06-02 RX ADMIN — MAGNESIUM GLUCONATE 500 MG ORAL TABLET 400 MG: 500 TABLET ORAL at 09:24

## 2024-06-02 RX ADMIN — MAGNESIUM GLUCONATE 500 MG ORAL TABLET 400 MG: 500 TABLET ORAL at 22:24

## 2024-06-02 RX ADMIN — POTASSIUM CHLORIDE 40 MEQ: 1500 TABLET, EXTENDED RELEASE ORAL at 09:23

## 2024-06-02 RX ADMIN — AMLODIPINE BESYLATE 5 MG: 5 TABLET ORAL at 09:24

## 2024-06-02 RX ADMIN — PANTOPRAZOLE SODIUM 40 MG: 40 INJECTION, POWDER, FOR SOLUTION INTRAVENOUS at 05:32

## 2024-06-02 RX ADMIN — PIPERACILLIN AND TAZOBACTAM 3375 MG: 3; .375 INJECTION, POWDER, LYOPHILIZED, FOR SOLUTION INTRAVENOUS at 05:32

## 2024-06-02 RX ADMIN — THIAMINE HYDROCHLORIDE 500 MG: 100 INJECTION, SOLUTION INTRAMUSCULAR; INTRAVENOUS at 10:19

## 2024-06-02 RX ADMIN — THIAMINE HYDROCHLORIDE 500 MG: 100 INJECTION, SOLUTION INTRAMUSCULAR; INTRAVENOUS at 20:50

## 2024-06-02 RX ADMIN — TIZANIDINE 4 MG: 2 TABLET ORAL at 10:13

## 2024-06-02 RX ADMIN — SODIUM CHLORIDE, PRESERVATIVE FREE 10 ML: 5 INJECTION INTRAVENOUS at 09:25

## 2024-06-02 RX ADMIN — PANTOPRAZOLE SODIUM 40 MG: 40 INJECTION, POWDER, FOR SOLUTION INTRAVENOUS at 18:35

## 2024-06-02 NOTE — PLAN OF CARE
Problem: Discharge Planning  Goal: Discharge to home or other facility with appropriate resources  6/2/2024 0005 by Debbie Blankenship GN  Outcome: Progressing  6/1/2024 1014 by Chaz Smiley RN  Outcome: Progressing     Problem: Safety - Adult  Goal: Free from fall injury  6/2/2024 0005 by Debbie Blankenship GN  Outcome: Progressing  6/1/2024 1014 by Chaz Smiley RN  Outcome: Progressing     Problem: Pain  Goal: Verbalizes/displays adequate comfort level or baseline comfort level  6/2/2024 0005 by Debbie Blankenship GN  Outcome: Progressing  6/1/2024 1014 by Chaz Smiley RN  Outcome: Progressing     Problem: Skin/Tissue Integrity  Goal: Absence of new skin breakdown  Description: 1.  Monitor for areas of redness and/or skin breakdown  2.  Assess vascular access sites hourly  3.  Every 4-6 hours minimum:  Change oxygen saturation probe site  4.  Every 4-6 hours:  If on nasal continuous positive airway pressure, respiratory therapy assess nares and determine need for appliance change or resting period.  Outcome: Progressing

## 2024-06-02 NOTE — PLAN OF CARE
Problem: Discharge Planning  Goal: Discharge to home or other facility with appropriate resources  6/2/2024 0749 by Celina Lopez RN  Outcome: Progressing  6/2/2024 0005 by Debbie Blankenship GN  Outcome: Progressing     Problem: Safety - Adult  Goal: Free from fall injury  6/2/2024 0749 by Celina Lopez RN  Outcome: Progressing  6/2/2024 0005 by Debbie Blankenship GN  Outcome: Progressing     Problem: Pain  Goal: Verbalizes/displays adequate comfort level or baseline comfort level  6/2/2024 0749 by Celina Lopez RN  Outcome: Progressing  6/2/2024 0005 by Debbie Blankenship GN  Outcome: Progressing     Problem: Skin/Tissue Integrity  Goal: Absence of new skin breakdown  Description: 1.  Monitor for areas of redness and/or skin breakdown  2.  Assess vascular access sites hourly  3.  Every 4-6 hours minimum:  Change oxygen saturation probe site  4.  Every 4-6 hours:  If on nasal continuous positive airway pressure, respiratory therapy assess nares and determine need for appliance change or resting period.  6/2/2024 0749 by Celina Lopez RN  Outcome: Progressing  6/2/2024 0005 by Debbie Blankenship GN  Outcome: Progressing

## 2024-06-02 NOTE — CARE COORDINATION
Pt is a 69 yo male admitted due to a lower GIB.  CM consult received \"For consideration of rehab.\"  CM has contacted the [facility]  to request he meet with the pt and offer ETOH cessation resources.  Pt is insured with pharmacy benefits and is established with a PCP.  Medications are affordable through Medicaid.  No additional transition of care needs identified or reported at present.  CM will continue to follow to assist as pt's needs are known.       06/02/24 6146   Service Assessment   Patient Orientation Alert and Oriented   Cognition Alert   History Provided By Medical Record   Primary Caregiver Self   Support Systems Family Members   Patient's Healthcare Decision Maker is: Legal Next of Kin   PCP Verified by CM Yes  ([facility])   Last Visit to PCP Within last 3 months   Prior Functional Level Independent in ADLs/IADLs   Current Functional Level Independent in ADLs/IADLs   Can patient return to prior living arrangement Yes   Ability to make needs known: Good   Family able to assist with home care needs: Yes   Would you like for me to discuss the discharge plan with any other family members/significant others, and if so, who? No   Financial Resources Medicare;Medicaid   Community Resources None   Social/Functional History   Lives With Alone   Type of Home House   Occupation Retired   Discharge Planning   Type of Residence House   Living Arrangements Alone   Current Services Prior To Admission None   Potential Assistance Needed Other (Comment)  (ETOH cessation resources)   DME Ordered? No   Potential Assistance Purchasing Medications No   Type of Home Care Services None   Patient expects to be discharged to: House   Services At/After Discharge   Transition of Care Consult (CM Consult) Other  (\"For consideration of rehab\" ETOH resources)   Services At/After Discharge Community Resources   [place] Resource Information Provided? No   Mode of Transport at Discharge Other (see

## 2024-06-03 VITALS
HEART RATE: 61 BPM | SYSTOLIC BLOOD PRESSURE: 161 MMHG | HEIGHT: 77 IN | OXYGEN SATURATION: 98 % | BODY MASS INDEX: 23.62 KG/M2 | TEMPERATURE: 98.8 F | RESPIRATION RATE: 18 BRPM | DIASTOLIC BLOOD PRESSURE: 83 MMHG | WEIGHT: 200 LBS

## 2024-06-03 LAB
ANION GAP SERPL CALC-SCNC: 10 MMOL/L (ref 9–18)
BACTERIA SPEC CULT: NORMAL
BASOPHILS # BLD: 0 K/UL (ref 0–0.2)
BASOPHILS NFR BLD: 0 % (ref 0–2)
BILIRUB UR QL: NEGATIVE
BILIRUB UR QL: NEGATIVE
BUN SERPL-MCNC: 8 MG/DL (ref 8–23)
CALCIUM SERPL-MCNC: 9.6 MG/DL (ref 8.8–10.2)
CHLORIDE SERPL-SCNC: 102 MMOL/L (ref 98–107)
CO2 SERPL-SCNC: 25 MMOL/L (ref 20–28)
CREAT SERPL-MCNC: 0.92 MG/DL (ref 0.8–1.3)
DIFFERENTIAL METHOD BLD: ABNORMAL
EOSINOPHIL # BLD: 0.2 K/UL (ref 0–0.8)
EOSINOPHIL NFR BLD: 2 % (ref 0.5–7.8)
ERYTHROCYTE [DISTWIDTH] IN BLOOD BY AUTOMATED COUNT: 12 % (ref 11.9–14.6)
GLUCOSE SERPL-MCNC: 145 MG/DL (ref 70–99)
GLUCOSE UR QL STRIP.AUTO: NEGATIVE MG/DL
GLUCOSE UR QL STRIP.AUTO: NEGATIVE MG/DL
HCT VFR BLD AUTO: 43.7 % (ref 41.1–50.3)
HGB BLD-MCNC: 14.2 G/DL (ref 13.6–17.2)
IMM GRANULOCYTES # BLD AUTO: 0 K/UL (ref 0–0.5)
IMM GRANULOCYTES NFR BLD AUTO: 0 % (ref 0–5)
KETONES UR-MCNC: NEGATIVE MG/DL
KETONES UR-MCNC: NEGATIVE MG/DL
LEUKOCYTE ESTERASE UR QL STRIP: NEGATIVE
LEUKOCYTE ESTERASE UR QL STRIP: NEGATIVE
LYMPHOCYTES # BLD: 1.7 K/UL (ref 0.5–4.6)
LYMPHOCYTES NFR BLD: 18 % (ref 13–44)
MCH RBC QN AUTO: 31 PG (ref 26.1–32.9)
MCHC RBC AUTO-ENTMCNC: 32.5 G/DL (ref 31.4–35)
MCV RBC AUTO: 95.4 FL (ref 82–102)
MONOCYTES # BLD: 0.8 K/UL (ref 0.1–1.3)
MONOCYTES NFR BLD: 8 % (ref 4–12)
NEUTS SEG # BLD: 6.5 K/UL (ref 1.7–8.2)
NEUTS SEG NFR BLD: 71 % (ref 43–78)
NITRITE UR QL: NEGATIVE
NITRITE UR QL: NEGATIVE
NRBC # BLD: 0 K/UL (ref 0–0.2)
PH UR: 5.5 (ref 5–9)
PH UR: 5.5 (ref 5–9)
PLATELET # BLD AUTO: 146 K/UL (ref 150–450)
PMV BLD AUTO: 10.1 FL (ref 9.4–12.3)
POTASSIUM SERPL-SCNC: 3.8 MMOL/L (ref 3.5–5.1)
PROT UR QL: 100 MG/DL
PROT UR QL: 100 MG/DL
RBC # BLD AUTO: 4.58 M/UL (ref 4.23–5.6)
RBC # UR STRIP: ABNORMAL
RBC # UR STRIP: ABNORMAL
SERVICE CMNT-IMP: ABNORMAL
SERVICE CMNT-IMP: ABNORMAL
SERVICE CMNT-IMP: NORMAL
SODIUM SERPL-SCNC: 138 MMOL/L (ref 136–145)
SP GR UR: >1.03 (ref 1–1.02)
SP GR UR: >1.03 (ref 1–1.02)
UROBILINOGEN UR QL: 0.2 EU/DL (ref 0.2–1)
UROBILINOGEN UR QL: 0.2 EU/DL (ref 0.2–1)
WBC # BLD AUTO: 9.2 K/UL (ref 4.3–11.1)

## 2024-06-03 PROCEDURE — 6370000000 HC RX 637 (ALT 250 FOR IP): Performed by: FAMILY MEDICINE

## 2024-06-03 PROCEDURE — 2580000003 HC RX 258: Performed by: FAMILY MEDICINE

## 2024-06-03 PROCEDURE — 2580000003 HC RX 258: Performed by: HOSPITALIST

## 2024-06-03 PROCEDURE — 80048 BASIC METABOLIC PNL TOTAL CA: CPT

## 2024-06-03 PROCEDURE — 85025 COMPLETE CBC W/AUTO DIFF WBC: CPT

## 2024-06-03 PROCEDURE — 36415 COLL VENOUS BLD VENIPUNCTURE: CPT

## 2024-06-03 PROCEDURE — 6370000000 HC RX 637 (ALT 250 FOR IP): Performed by: HOSPITALIST

## 2024-06-03 RX ORDER — LANOLIN ALCOHOL/MO/W.PET/CERES
400 CREAM (GRAM) TOPICAL 2 TIMES DAILY
Qty: 20 TABLET | Refills: 0 | Status: SHIPPED | OUTPATIENT
Start: 2024-06-03 | End: 2024-06-13

## 2024-06-03 RX ORDER — LANOLIN ALCOHOL/MO/W.PET/CERES
100 CREAM (GRAM) TOPICAL DAILY
Qty: 15 TABLET | Refills: 0 | Status: SHIPPED | OUTPATIENT
Start: 2024-06-09 | End: 2024-06-24

## 2024-06-03 RX ORDER — AMOXICILLIN AND CLAVULANATE POTASSIUM 875; 125 MG/1; MG/1
1 TABLET, FILM COATED ORAL 2 TIMES DAILY
Qty: 14 TABLET | Refills: 0 | Status: SHIPPED | OUTPATIENT
Start: 2024-06-03 | End: 2024-06-10

## 2024-06-03 RX ORDER — POTASSIUM CHLORIDE 20 MEQ/1
40 TABLET, EXTENDED RELEASE ORAL ONCE
Status: COMPLETED | OUTPATIENT
Start: 2024-06-03 | End: 2024-06-03

## 2024-06-03 RX ORDER — AMOXICILLIN AND CLAVULANATE POTASSIUM 500; 125 MG/1; MG/1
1 TABLET, FILM COATED ORAL 3 TIMES DAILY
Qty: 21 TABLET | Refills: 0 | Status: SHIPPED | OUTPATIENT
Start: 2024-06-03 | End: 2024-06-03 | Stop reason: HOSPADM

## 2024-06-03 RX ADMIN — SODIUM CHLORIDE, PRESERVATIVE FREE 5 ML: 5 INJECTION INTRAVENOUS at 08:59

## 2024-06-03 RX ADMIN — FLUTICASONE PROPIONATE 1 SPRAY: 50 SPRAY, METERED NASAL at 08:59

## 2024-06-03 RX ADMIN — AMLODIPINE BESYLATE 5 MG: 5 TABLET ORAL at 08:55

## 2024-06-03 RX ADMIN — MAGNESIUM GLUCONATE 500 MG ORAL TABLET 400 MG: 500 TABLET ORAL at 08:55

## 2024-06-03 RX ADMIN — B-COMPLEX W/ C & FOLIC ACID TAB 1 TABLET: TAB at 08:54

## 2024-06-03 RX ADMIN — LISINOPRIL 20 MG: 20 TABLET ORAL at 08:54

## 2024-06-03 RX ADMIN — POTASSIUM CHLORIDE 40 MEQ: 1500 TABLET, EXTENDED RELEASE ORAL at 10:18

## 2024-06-03 NOTE — DISCHARGE SUMMARY
Hospitalist Discharge Summary   Admit Date:  2024  4:45 PM   DC Note date: 6/3/2024  Name:  Uche Gagnon   Age:  68 y.o.  Sex:  male  :  1955   MRN:  [id number]   Room:  [facility]  PCP:  Wu Howell MD    Presenting Complaint: Rectal Bleeding     Initial Admission Diagnosis: Hemorrhage of rectum and anus [K62.5]  Lactic acidosis [E87.20]  Lower GI bleeding [K92.2]     Problem List for this Hospitalization (present on admission):    Principal Problem:    Acute lower GI bleeding  Active Problems:    Essential hypertension    Colitis    Lower GI bleeding  Resolved Problems:    * No resolved hospital problems. *      Hospital Course:    This is a 68 y.o. male with medical history of IBS with constipation and diarrhea, hypertension, GERD, alcohol, tobacco, marijuana use, presented to the ER.  Diffuse abdominal discomfort.  Patient states he had episodes of bright red blood per rectum with 2 episodes of dark stool and then bright red stools.  Pt was admitted for further evaluation management of acute colitis, lower GI bleed. Patient was started on Zosyn.  CT abdomen pelvis on admission showed findings concerning for descending and sigmoid colitis.  He was tolerating liquid diet well and therefore diet was advanced to full liquid diet and subsequently to regular diet.  GI was consulted.  Recommends checking stool cultures which were negative.  C. difficile is negative.  Patient could have ischemic colitis and to avoid hypotension.  Lactic acid was 3.3 on admission and subsequently recheck was 1.1.  Zosyn was switched to Augmentin on discharge.  Hemoglobin stable at 14 on discharge.  He has alcohol dependence for which he was monitored on CIWA protocol.  No signs of alcohol withdrawal.  Other chronic medical conditions which are stable include hypertension.  Patient is discharged home today in a stable condition.    Disposition: Home  Diet: ADULT DIET; Regular  Code Status: Full Code    Follow

## 2024-06-03 NOTE — PROGRESS NOTES
Hospitalist Progress Note   Admit Date:  2024  4:45 PM   Name:  Uche Gagnon   Age:  68 y.o.  Sex:  male  :  1955   MRN:  [id number]   Room:  [facility]/    Presenting/Chief Complaint: Rectal Bleeding     Reason(s) for Admission: Hemorrhage of rectum and anus [K62.5]  Lactic acidosis [E87.20]  Lower GI bleeding [K92.2]     Hospital Course:   Uche Gagnon is a 68 y.o. male with medical history of IBS with constipation and diarrhea, hypertension, GERD, alcohol, tobacco, marijuana use, presented to the ER.  Diffuse abdominal discomfort.  Patient states he had episodes of bright red blood per rectum with 2 episodes of dark stool and then bright red stools.  Was admitted for further evaluation management of acute colitis, lower GI bleed.    Subjective & 24hr Events:   Patient is resting in bed.  Still has lower abdominal discomfort.  No fever or chills.  No chest pain.  Still has bright red blood per rectum.  Today is day 0 of hospitalization.  I saw and evaluated the patient at bedside.      Assessment & Plan:     This is a 71-year-old male with    Acute colitis/lower GI bleed  Continue Zosyn.  GI consulted.  Appreciate recommendations.  CT abdomen pelvis on admission shows findings concerning for descending and sigmoid colitis.  Continue hydration with IV fluids.  Pain management per pain scale.  Hemoglobin is 14.1 this morning from 15.9 on admission.   Trend H&H.  Stool for C. difficile.  Stool cultures.    Hypertension  Continue home medications    Alcohol dependence  [facility] protocol.  Counseled to quit alcohol.    Anticipated Discharge Arrangements:   Home likely in 48- 72 hours    PT/OT evals ordered?  Therapy evals ordered  Diet:  ADULT DIET; Clear Liquid  VTE prophylaxis: SCD's   Code status: Full Code      Non-peripheral Lines and Tubes (if present):          Telemetry (if present):  Cardiac/Telemetry Monitor On: Portable telemetry pack [facility] 
4 Eyes Skin Assessment     NAME:  Uche Gagnon  YOB: 1955  MEDICAL RECORD NUMBER:  [id number]    The patient is being assessed for  Admission    I agree that at least one RN has performed a thorough Head to Toe Skin Assessment on the patient. ALL assessment sites listed below have been assessed.      Areas assessed by both nurses:    Head, Face, Ears, Shoulders, Back, Chest, Arms, Elbows, Hands, Sacrum. Buttock, Coccyx, Ischium, and Legs. Feet and Heels        Does the Patient have a Wound? No noted wound(s)       Morris Prevention initiated by RN: Yes  Wound Care Orders initiated by RN: No    Pressure Injury (Stage 3,4, Unstageable, DTI, NWPT, and Complex wounds) if present, place Wound referral order by RN under : No    New Ostomies, if present place, Ostomy referral order under : No     Nurse 1 eSignature: Electronically signed by SU Becker on 6/1/24 at 4:45 AM EDT    **SHARE this note so that the co-signing nurse can place an eSignature**    Nurse 2 eSignature: Electronically signed by Darwin Cabezas RN on 6/1/24 at 6:10 AM EDT   
TRANSFER - IN REPORT:    Verbal report received from DAR Griggs on Uche Gagnon  being received from ED for routine progression of patient care      Report consisted of patient's Situation, Background, Assessment and   Recommendations(SBAR).     Information from the following report(s) Nurse Handoff Report was reviewed with the receiving nurse.    Opportunity for questions and clarification was provided.      Assessment completed upon patient's arrival to unit and care assumed.    
This RN attempted to administer scheduled IV antibiotics. Patient has refused scheduled administration of Zosyn stating, \" I don't want it because it makes my stomach hurt.\" This RN messaged hospitalist on shift. No new orders were received. Patient has completed six total  doses of Zosyn. I have not given him any Zosyn today on this shift. He is refusing the remainder of his scheduled doses of Zosyn. Patient is alert and oriented x 4. He was educated on the risks  of refusing the remaining doses of Zosyn and educated on indications of this medication. Patient is in bed, bed is locked and in lowest position with alarm on, vital signs are stable and his call light and bedside table is within reach. Pt. Denies any other needs at this time.   
18 mmol/L    Glucose 117 (H) 70 - 99 mg/dL    BUN 6 (L) 8 - 23 MG/DL    Creatinine 0.83 0.80 - 1.30 MG/DL    Est, Glom Filt Rate >90 >60 ml/min/1.73m2    Calcium 8.9 8.8 - 10.2 MG/DL   Magnesium    Collection Time: 06/02/24  3:21 AM   Result Value Ref Range    Magnesium 1.8 1.8 - 2.4 mg/dL   Hemoglobin and Hematocrit    Collection Time: 06/02/24  9:05 AM   Result Value Ref Range    Hemoglobin 13.8 13.6 - 17.2 g/dL    Hematocrit 41.2 41.1 - 50.3 %   Hemoglobin and Hematocrit    Collection Time: 06/02/24  3:21 PM   Result Value Ref Range    Hemoglobin 14.3 13.6 - 17.2 g/dL    Hematocrit 41.8 41.1 - 50.3 %   Hemoglobin and Hematocrit    Collection Time: 06/02/24  9:17 PM   Result Value Ref Range    Hemoglobin 13.5 (L) 13.6 - 17.2 g/dL    Hematocrit 40.6 (L) 41.1 - 50.3 %       No results for input(s): \"COVID19\" in the last 72 hours.    Current Meds:  Current Facility-Administered Medications   Medication Dose Route Frequency    potassium chloride (KLOR-CON M) extended release tablet 40 mEq  40 mEq Oral Once    magnesium oxide (MAG-OX) tablet 400 mg  400 mg Oral BID    simethicone (MYLICON) chewable tablet 80 mg  80 mg Oral Q6H PRN    piperacillin-tazobactam (ZOSYN) 3,375 mg in sodium chloride 0.9 % 50 mL IVPB (mini-bag)  3,375 mg IntraVENous Q8H    amLODIPine (NORVASC) tablet 5 mg  5 mg Oral Daily    fluticasone (FLONASE) 50 MCG/ACT nasal spray 1 spray  1 spray Each Nostril Daily    lisinopril (PRINIVIL;ZESTRIL) tablet 20 mg  20 mg Oral Daily    pantoprazole (PROTONIX) 40 mg in sodium chloride (PF) 0.9 % 10 mL injection  40 mg IntraVENous Q12H    tiZANidine (ZANAFLEX) tablet 4 mg  4 mg Oral Q8H PRN    sodium chloride flush 0.9 % injection 5-40 mL  5-40 mL IntraVENous 2 times per day    sodium chloride flush 0.9 % injection 5-40 mL  5-40 mL IntraVENous PRN    0.9 % sodium chloride infusion   IntraVENous PRN    ondansetron (ZOFRAN-ODT) disintegrating tablet 4 mg  4 mg Oral Q8H PRN    Or    ondansetron (ZOFRAN) injection

## 2024-06-03 NOTE — PLAN OF CARE
Problem: Discharge Planning  Goal: Discharge to home or other facility with appropriate resources  6/3/2024 0857 by Freida Taylor RN  Outcome: Progressing  6/3/2024 0101 by Debbie Blankenship GN  Outcome: Progressing     Problem: Safety - Adult  Goal: Free from fall injury  6/3/2024 0857 by Freida Taylor RN  Outcome: Progressing  6/3/2024 0101 by Debbie Blankenship GN  Outcome: Progressing     Problem: Pain  Goal: Verbalizes/displays adequate comfort level or baseline comfort level  6/3/2024 0857 by Freida Taylor RN  Outcome: Progressing  6/3/2024 0101 by Debbie Blankenship GN  Outcome: Progressing     Problem: Skin/Tissue Integrity  Goal: Absence of new skin breakdown  Description: 1.  Monitor for areas of redness and/or skin breakdown  2.  Assess vascular access sites hourly  3.  Every 4-6 hours minimum:  Change oxygen saturation probe site  4.  Every 4-6 hours:  If on nasal continuous positive airway pressure, respiratory therapy assess nares and determine need for appliance change or resting period.  6/3/2024 0857 by Freida Taylor RN  Outcome: Progressing  6/3/2024 0101 by Debbie Blankenship GN  Outcome: Progressing     Problem: ABCDS Injury Assessment  Goal: Absence of physical injury  Outcome: Progressing

## 2024-06-03 NOTE — CARE COORDINATION
CM reviewed previous CM documentation for continuity of care r/t transitions of care planning. G.I. consult and recommendations from Friday reviewed.  Stool studies were being followed for hopeful advancement of diet and possible discharge today.  F.U message sent to Jameson with [facility] to confirm he will see patient today and provide current CM assignment.      No additional transitions of care needs noted at this time.      Please consult CM for any noted CM needs.

## 2024-06-04 ENCOUNTER — TELEPHONE (OUTPATIENT)
Dept: GASTROENTEROLOGY | Age: 69
End: 2024-06-04

## 2024-06-04 ENCOUNTER — PREP FOR PROCEDURE (OUTPATIENT)
Dept: GASTROENTEROLOGY | Age: 69
End: 2024-06-04

## 2024-06-04 DIAGNOSIS — K62.5 BRBPR (BRIGHT RED BLOOD PER RECTUM): ICD-10-CM

## 2024-06-04 DIAGNOSIS — Z12.11 ENCOUNTER FOR SCREENING COLONOSCOPY: ICD-10-CM

## 2024-06-04 PROBLEM — R10.9 ABDOMINAL PAIN: Status: ACTIVE | Noted: 2024-06-04

## 2024-06-04 LAB
EKG ATRIAL RATE: 61 BPM
EKG DIAGNOSIS: NORMAL
EKG P AXIS: 45 DEGREES
EKG P-R INTERVAL: 167 MS
EKG Q-T INTERVAL: 402 MS
EKG QRS DURATION: 123 MS
EKG QTC CALCULATION (BAZETT): 409 MS
EKG R AXIS: -62 DEGREES
EKG T AXIS: 88 DEGREES
EKG VENTRICULAR RATE: 62 BPM

## 2024-06-04 NOTE — TELEPHONE ENCOUNTER
After the procedure:   Effects of sedation: You may have lapses of memory, slowed reaction time and impaired judgement for 24 hours. Do not assume responsibility for young children or anyone dependent on your care. Avoid making important decisions or signing legal documents. Have a responsible adult stay with you the remainder of the day. Do not drive 24 hours after the procedure.    Discomfort: It is normal to feel bloated, have gas pains and pass large amounts of air. Walk to help relieve discomfort and/or take a non-aspirin pain reliever containing acetaminophen, such as Tylenol or generic equivalent, in the recommended dose as needed.    Medications: You may resume taking your medications unless otherwise instructed.    Activity: Rest the remainder of the day. After 24 hours, resume your regular activity as you feel able.   Diet: Resume diet as instructed by your physician. Do not drink alcoholic beverages for 24 hours.       FREQUENTLY ASKED QUESTIONS      What if I start to feel nauseated or start vomiting? Wait 15-30 minutes before continuing prep then start back slowly. You may also take Benadryl to help control nausea.    I am taking the prep and already have loose, watery stools; Do i still need the rest of the prep? Yes, you may have solid stool higher in the colon that needs to be eliminated.    I already have diarrhea before taking the prep, do i still have to take the laxative? Yes, you must take the prep as directed by your doctor. Your colon is approximately 6 feet long. The entire colon must be emptied for your physician to see the colon clearly.    One of the medications I was instructed to take the morning of the procedure is red. Can I take it? Medication for blood pressure, thyroid, heart conditions, and seizures should be taken in the morning of your procedure regardless of color.    Dizziness and headaches could be signs of low blood sugar. Drinking a regular carbonated beverage (not diet)

## 2024-06-06 LAB
BACTERIA SPEC CULT: NORMAL
BACTERIA SPEC CULT: NORMAL
SERVICE CMNT-IMP: NORMAL
SERVICE CMNT-IMP: NORMAL

## 2024-06-09 RX ORDER — SODIUM CHLORIDE 0.9 % (FLUSH) 0.9 %
5-40 SYRINGE (ML) INJECTION EVERY 12 HOURS SCHEDULED
Status: CANCELLED | OUTPATIENT
Start: 2024-06-09

## 2024-06-09 RX ORDER — SODIUM CHLORIDE 9 MG/ML
25 INJECTION, SOLUTION INTRAVENOUS PRN
Status: CANCELLED | OUTPATIENT
Start: 2024-06-09

## 2024-06-09 RX ORDER — SODIUM CHLORIDE 0.9 % (FLUSH) 0.9 %
5-40 SYRINGE (ML) INJECTION PRN
Status: CANCELLED | OUTPATIENT
Start: 2024-06-09

## 2024-06-19 ENCOUNTER — TELEPHONE (OUTPATIENT)
Age: 69
End: 2024-06-19

## 2024-06-19 NOTE — TELEPHONE ENCOUNTER
----- Message from Nolan Silvestre III, MD sent at 6/19/2024  9:53 AM EDT -----  Please let patient know that recent stress test was normal. Patient can follow up as scheduled.

## 2024-07-01 ENCOUNTER — TELEPHONE (OUTPATIENT)
Dept: ORTHOPEDIC SURGERY | Age: 69
End: 2024-07-01

## 2024-07-01 NOTE — TELEPHONE ENCOUNTER
His auth for surgery expires this week. Мария will need to be gotten again when his surgery gets rescheduled.

## 2024-07-04 PROBLEM — Z12.11 ENCOUNTER FOR SCREENING COLONOSCOPY: Status: RESOLVED | Noted: 2024-06-04 | Resolved: 2024-07-04

## 2024-08-09 ENCOUNTER — HOSPITAL ENCOUNTER (EMERGENCY)
Age: 69
Discharge: HOME | End: 2024-08-10
Attending: EMERGENCY MEDICINE
Payer: MEDICARE

## 2024-08-09 DIAGNOSIS — R74.01 TRANSAMINITIS: ICD-10-CM

## 2024-08-09 DIAGNOSIS — T78.40XA ALLERGIC REACTION, INITIAL ENCOUNTER: Primary | ICD-10-CM

## 2024-08-09 DIAGNOSIS — T63.441A BEE STING, ACCIDENTAL OR UNINTENTIONAL, INITIAL ENCOUNTER: ICD-10-CM

## 2024-08-09 DIAGNOSIS — N17.9 AKI (ACUTE KIDNEY INJURY) (HCC): ICD-10-CM

## 2024-08-09 DIAGNOSIS — Z78.9 ALCOHOL USE: ICD-10-CM

## 2024-08-09 LAB
ALBUMIN SERPL-MCNC: 4 G/DL (ref 3.2–4.6)
ALBUMIN/GLOB SERPL: 1.2 (ref 1–1.9)
ALP SERPL-CCNC: 48 U/L (ref 40–129)
ALT SERPL-CCNC: 81 U/L (ref 12–65)
ANION GAP SERPL CALC-SCNC: 20 MMOL/L (ref 9–18)
AST SERPL-CCNC: 69 U/L (ref 15–37)
BASOPHILS # BLD: 0 K/UL (ref 0–0.2)
BASOPHILS NFR BLD: 0 % (ref 0–2)
BILIRUB SERPL-MCNC: 0.3 MG/DL (ref 0–1.2)
BUN SERPL-MCNC: 38 MG/DL (ref 8–23)
CALCIUM SERPL-MCNC: 10.1 MG/DL (ref 8.8–10.2)
CHLORIDE SERPL-SCNC: 105 MMOL/L (ref 98–107)
CK SERPL-CCNC: 124 U/L (ref 21–215)
CO2 SERPL-SCNC: 19 MMOL/L (ref 20–28)
CREAT SERPL-MCNC: 1.56 MG/DL (ref 0.8–1.3)
DIFFERENTIAL METHOD BLD: ABNORMAL
EOSINOPHIL # BLD: 0.2 K/UL (ref 0–0.8)
EOSINOPHIL NFR BLD: 2 % (ref 0.5–7.8)
ERYTHROCYTE [DISTWIDTH] IN BLOOD BY AUTOMATED COUNT: 13.1 % (ref 11.9–14.6)
ETHANOL SERPL-MCNC: 125 MG/DL (ref 0–0.08)
GLOBULIN SER CALC-MCNC: 3.2 G/DL (ref 2.3–3.5)
GLUCOSE SERPL-MCNC: 116 MG/DL (ref 70–99)
HCT VFR BLD AUTO: 45 % (ref 41.1–50.3)
HGB BLD-MCNC: 15.2 G/DL (ref 13.6–17.2)
IMM GRANULOCYTES # BLD AUTO: 0 K/UL (ref 0–0.5)
IMM GRANULOCYTES NFR BLD AUTO: 0 % (ref 0–5)
LYMPHOCYTES # BLD: 3.6 K/UL (ref 0.5–4.6)
LYMPHOCYTES NFR BLD: 45 % (ref 13–44)
MCH RBC QN AUTO: 32.1 PG (ref 26.1–32.9)
MCHC RBC AUTO-ENTMCNC: 33.8 G/DL (ref 31.4–35)
MCV RBC AUTO: 94.9 FL (ref 82–102)
MONOCYTES # BLD: 0.7 K/UL (ref 0.1–1.3)
MONOCYTES NFR BLD: 9 % (ref 4–12)
NEUTS SEG # BLD: 3.5 K/UL (ref 1.7–8.2)
NEUTS SEG NFR BLD: 44 % (ref 43–78)
NRBC # BLD: 0 K/UL (ref 0–0.2)
PLATELET # BLD AUTO: 196 K/UL (ref 150–450)
PMV BLD AUTO: 9.5 FL (ref 9.4–12.3)
POTASSIUM SERPL-SCNC: 3.6 MMOL/L (ref 3.5–5.1)
PROT SERPL-MCNC: 7.2 G/DL (ref 6.3–8.2)
RBC # BLD AUTO: 4.74 M/UL (ref 4.23–5.6)
SODIUM SERPL-SCNC: 144 MMOL/L (ref 136–145)
TROPONIN T SERPL HS-MCNC: 15 NG/L (ref 0–22)
TROPONIN T SERPL HS-MCNC: 22 NG/L (ref 0–22)
WBC # BLD AUTO: 8.1 K/UL (ref 4.3–11.1)

## 2024-08-09 PROCEDURE — 82550 ASSAY OF CK (CPK): CPT

## 2024-08-09 PROCEDURE — 99284 EMERGENCY DEPT VISIT MOD MDM: CPT

## 2024-08-09 PROCEDURE — 85025 COMPLETE CBC W/AUTO DIFF WBC: CPT

## 2024-08-09 PROCEDURE — 84484 ASSAY OF TROPONIN QUANT: CPT

## 2024-08-09 PROCEDURE — 82077 ASSAY SPEC XCP UR&BREATH IA: CPT

## 2024-08-09 PROCEDURE — 93005 ELECTROCARDIOGRAM TRACING: CPT | Performed by: EMERGENCY MEDICINE

## 2024-08-09 PROCEDURE — 96360 HYDRATION IV INFUSION INIT: CPT

## 2024-08-09 PROCEDURE — 2580000003 HC RX 258: Performed by: EMERGENCY MEDICINE

## 2024-08-09 PROCEDURE — 80053 COMPREHEN METABOLIC PANEL: CPT

## 2024-08-09 RX ORDER — 0.9 % SODIUM CHLORIDE 0.9 %
1000 INTRAVENOUS SOLUTION INTRAVENOUS ONCE
Status: COMPLETED | OUTPATIENT
Start: 2024-08-09 | End: 2024-08-09

## 2024-08-09 RX ORDER — 0.9 % SODIUM CHLORIDE 0.9 %
1000 INTRAVENOUS SOLUTION INTRAVENOUS ONCE
Status: COMPLETED | OUTPATIENT
Start: 2024-08-10 | End: 2024-08-10

## 2024-08-09 RX ADMIN — SODIUM CHLORIDE 1000 ML: 9 INJECTION, SOLUTION INTRAVENOUS at 21:34

## 2024-08-09 ASSESSMENT — PAIN DESCRIPTION - LOCATION: LOCATION: FACE

## 2024-08-09 ASSESSMENT — PAIN DESCRIPTION - DESCRIPTORS: DESCRIPTORS: PRESSURE

## 2024-08-09 ASSESSMENT — PAIN - FUNCTIONAL ASSESSMENT: PAIN_FUNCTIONAL_ASSESSMENT: 0-10

## 2024-08-09 ASSESSMENT — LIFESTYLE VARIABLES
HOW OFTEN DO YOU HAVE A DRINK CONTAINING ALCOHOL: 2-4 TIMES A MONTH
HOW MANY STANDARD DRINKS CONTAINING ALCOHOL DO YOU HAVE ON A TYPICAL DAY: 3 OR 4

## 2024-08-10 VITALS
HEART RATE: 93 BPM | RESPIRATION RATE: 18 BRPM | OXYGEN SATURATION: 96 % | WEIGHT: 206 LBS | DIASTOLIC BLOOD PRESSURE: 82 MMHG | BODY MASS INDEX: 24.32 KG/M2 | SYSTOLIC BLOOD PRESSURE: 155 MMHG | TEMPERATURE: 98.5 F | HEIGHT: 77 IN

## 2024-08-10 LAB
EKG ATRIAL RATE: 101 BPM
EKG DIAGNOSIS: NORMAL
EKG P AXIS: 47 DEGREES
EKG P-R INTERVAL: 164 MS
EKG Q-T INTERVAL: 362 MS
EKG QRS DURATION: 124 MS
EKG QTC CALCULATION (BAZETT): 467 MS
EKG R AXIS: -59 DEGREES
EKG T AXIS: 93 DEGREES
EKG VENTRICULAR RATE: 100 BPM

## 2024-08-10 PROCEDURE — 96361 HYDRATE IV INFUSION ADD-ON: CPT

## 2024-08-10 PROCEDURE — 93010 ELECTROCARDIOGRAM REPORT: CPT | Performed by: INTERNAL MEDICINE

## 2024-08-10 PROCEDURE — 2580000003 HC RX 258: Performed by: EMERGENCY MEDICINE

## 2024-08-10 RX ORDER — PREDNISONE 20 MG/1
40 TABLET ORAL DAILY
Qty: 10 TABLET | Refills: 0 | Status: SHIPPED | OUTPATIENT
Start: 2024-08-10 | End: 2024-08-15

## 2024-08-10 RX ORDER — EPINEPHRINE 0.3 MG/.3ML
0.3 INJECTION SUBCUTANEOUS
Qty: 1 EACH | Refills: 0 | Status: SHIPPED | OUTPATIENT
Start: 2024-08-10 | End: 2024-08-10

## 2024-08-10 RX ADMIN — SODIUM CHLORIDE 1000 ML: 9 INJECTION, SOLUTION INTRAVENOUS at 00:07

## 2024-08-10 ASSESSMENT — ENCOUNTER SYMPTOMS
STRIDOR: 0
VOICE CHANGE: 0
WHEEZING: 0
SORE THROAT: 0
SHORTNESS OF BREATH: 0
TROUBLE SWALLOWING: 0
VOMITING: 0
NAUSEA: 0
ABDOMINAL PAIN: 0
COUGH: 0
DIARRHEA: 0

## 2024-08-10 NOTE — DISCHARGE INSTRUCTIONS
Use EpiPen as directed for allergic reaction.  Take prednisone as prescribed.  Schedule close follow-up with primary care physician.  Return to ED if symptoms worsen or progress in any way.  Ensure that you drink plenty of fluids and remain hydrated.

## 2024-08-10 NOTE — ED PROVIDER NOTES
Emergency Department Provider Note       PCP: Wu Howell MD   Age: 68 y.o.   Sex: male     DISPOSITION Decision To Discharge 08/10/2024 12:15:12 AM       ICD-10-CM    1. Allergic reaction, initial encounter  T78.40XA       2. Bee sting, accidental or unintentional, initial encounter  T63.441A       3. SONI (acute kidney injury) (HCC)  N17.9       4. Alcohol use  Z78.9       5. Transaminitis  R74.01           Medical Decision Making     68-year-old male with history of alcohol use, drug abuse, lower GI bleed, hypertension presents via EMS from home with complaint of multiple bee stings that occurred just prior to arrival. Per EMS, patient was initially noted to be hypotensive (BP 60s/30s on their arrival) and diaphoretic. Pt subsequently given 0.3 mg IM epinephrine, 500 mL NS IVF bolus, Solu-medrol 125 mg IV, and Benadryl 50 mg IV. BP improved in route with improvement of rash and overall symptoms.    Pt alert and oriented x4 on arrival. BP stable.  2 bee stingers were removed from L ear.  No tongue swelling. Pt not hypoxic. No posterior OP edema or swelling noted. Minimal rash noted to R antecubital fossa and minimal swelling noted to bilateral eyelids.  IV established and basic labs were obtained.  SONI noted. Cr 1.56 today; previous Cr 0.92 on 6/3/24.  Ethyl alcohol level 125. . Initial trop 22 w/ repeat trop 15.  AST 69, ALT 81; likely 2/2 alcohol use.  Patient observed in ED for over 5 hrs w/ complete resolution of symptoms. No indication for repeat doses of epi on arrival and during ER observation.  Will discharge home with epipen and course of prednisone.  Pt instructed to return to ED if symptoms worsen or progress in any way. Pt given strict return precautions. Pt clinically sober at time of discharge.       1 or more acute illnesses that pose a threat to life or bodily function.   1 acute illness with systemic symptoms.  Prescription drug management performed.  Patient was discharged risks and  benefits of hospitalization were considered.  Chronic medical problems impacting care include alcohol use.  Shared medical decision making was utilized in creating the patients health plan today.    I independently ordered and reviewed each unique test.  I reviewed external records: ED visit note from an outside group.  I reviewed external records: provider visit note from PCP.  I reviewed external records: provider visit note from outside specialist.  I reviewed external records: previous lab results from outside ED.   The patients assessment required an independent historian: EMS provided information in regards to initial presentation and medications administered.  The reason they were needed is important historical information not provided by the patient.    My Independent EKG Interpretation: sinus rhythm, no evidence of arrhythmia and left ventricular hypertrophy      ST Segments:Normal ST segments - NO STEMI   Rate: 107; no ST elevation noted.      Exclusion criteria - the patient is NOT to be included for SEP-1 Core Measure due to: Infection is not suspected   Critical care procedure note : 32 minutes of critical care time was performed in the emergency department. This was separate from any other procedures listed during the patients emergency department course. The failure to initiate these interventions on an urgent basis would likely have resulted in sudden, clinically significant or life-threatening deterioration in the patients condition.     History     68-year-old male with history of CVA, alcohol use, drug abuse, lower GI bleed, hypertension presents via EMS from home with complaint of multiple bee stings that occurred just prior to arrival.  Patient with known severe allergy to bee bee stings.  Patient did not have an EpiPen at home.  According to EMS, patient was initially noted to be hypotensive (BP 60s/30s on their arrival) and diaphoretic patient was initially given 0.3 mg IM epinephrine to the

## 2024-08-10 NOTE — ED TRIAGE NOTES
Patient arrives via ems from home. Patient was stung by mulitple bees with a known allergy. Patient did not have an epi pen at home. Patient initial bp 60/30, diaphoretic, AMS. EMS gave 0.3 mg left deltoid IM EPI, no change in blood pressure. EMS gave 30 mcg push dose epi and pressure improved to 140 /80. EMS gave 500 ml of NS, 50 mg IV benadryl, 125 mg of solumedrol. Lung sounds clear. Tongue swelling increasing per EMS.

## 2025-01-06 ENCOUNTER — TELEPHONE (OUTPATIENT)
Age: 70
End: 2025-01-06

## 2025-01-06 NOTE — TELEPHONE ENCOUNTER
[facility] called and scheduled pt an appt for 01- due to an abnormal EKG.   [facility] called back and stated pt could not make that appt asked if we could call and schedule pt. Advised we were short staffed and several calls in queue.   I contacted pt 3x's and each time it states VM was full and I could not leave a message. Called [facility] back and advised we could not reach pt. Pt would have to call the office to schedule an appt.

## 2025-01-06 NOTE — TELEPHONE ENCOUNTER
Called pt at 3:49pm 01-/ no answer/ states VM is full  Called pt at 4:01pm  01- no answer/ states VM is full

## 2025-01-30 ENCOUNTER — OFFICE VISIT (OUTPATIENT)
Age: 70
End: 2025-01-30
Payer: MEDICARE

## 2025-01-30 VITALS
HEART RATE: 85 BPM | WEIGHT: 189 LBS | SYSTOLIC BLOOD PRESSURE: 164 MMHG | HEIGHT: 77 IN | DIASTOLIC BLOOD PRESSURE: 92 MMHG | BODY MASS INDEX: 22.32 KG/M2

## 2025-01-30 DIAGNOSIS — R94.31 ABNORMAL ELECTROCARDIOGRAPHY: Primary | ICD-10-CM

## 2025-01-30 PROCEDURE — 99214 OFFICE O/P EST MOD 30 MIN: CPT | Performed by: INTERNAL MEDICINE

## 2025-01-30 PROCEDURE — G8428 CUR MEDS NOT DOCUMENT: HCPCS | Performed by: INTERNAL MEDICINE

## 2025-01-30 PROCEDURE — 3077F SYST BP >= 140 MM HG: CPT | Performed by: INTERNAL MEDICINE

## 2025-01-30 PROCEDURE — 1126F AMNT PAIN NOTED NONE PRSNT: CPT | Performed by: INTERNAL MEDICINE

## 2025-01-30 PROCEDURE — 3080F DIAST BP >= 90 MM HG: CPT | Performed by: INTERNAL MEDICINE

## 2025-01-30 PROCEDURE — 3017F COLORECTAL CA SCREEN DOC REV: CPT | Performed by: INTERNAL MEDICINE

## 2025-01-30 PROCEDURE — 1123F ACP DISCUSS/DSCN MKR DOCD: CPT | Performed by: INTERNAL MEDICINE

## 2025-01-30 PROCEDURE — 4004F PT TOBACCO SCREEN RCVD TLK: CPT | Performed by: INTERNAL MEDICINE

## 2025-01-30 PROCEDURE — G8420 CALC BMI NORM PARAMETERS: HCPCS | Performed by: INTERNAL MEDICINE

## 2025-01-30 RX ORDER — ALBUTEROL SULFATE 90 UG/1
2 INHALANT RESPIRATORY (INHALATION) DAILY PRN
COMMUNITY
Start: 2025-01-14

## 2025-01-30 ASSESSMENT — ENCOUNTER SYMPTOMS
SHORTNESS OF BREATH: 0
ABDOMINAL PAIN: 0

## 2025-01-30 NOTE — PROGRESS NOTES
pain.    Diagnoses and all orders for this visit:    Abnormal electrocardiography  -     Echo (TTE) complete (PRN contrast/bubble/strain/3D); Future          IMPRESSION:    Ms. Gagnon presents today for follow-up.  Stable symptom wise from a cardiac standpoint today.  Will get an echocardiogram to evaluate resting LV function and structure.  Blood pressure is little high today, will need aggressive antihypertensive control.  Stress test less than a year ago showed no evidence of ischemia.  Follow-up pending his echo results.        No follow-ups on file.    Personal time spent with chart review, interview/physical examination, documentation and coordination of care totals 33 minutes.       Thank you for allowing me to participate in this patient's care.  Please call or contact me if there are any questions or concerns regarding the above.      Nolan Silvestre III, MD  01/30/25  2:24 PM

## 2025-03-14 ENCOUNTER — TELEPHONE (OUTPATIENT)
Age: 70
End: 2025-03-14

## 2025-03-14 NOTE — TELEPHONE ENCOUNTER
I called the patient and he stated he would like to know the results of the echo.  Patient canceled his appointment he ahd today.  Patient stated if nothing is wrong he will not be rescheduling.

## 2025-03-18 NOTE — TELEPHONE ENCOUNTER
I called the patient and informed him of his results.  I informed the patient that Dr. Silvestre would like for him to be seen in the office to discuss results and make medication changes.  Patient states he is going out of town.  Patient wanted to be seen in May.  Patient is scheduled for 5/2/2025.    Nolan Silvestre III, MD  You4 hours ago (9:04 AM)       Echo showed mildly reduced ejection fraction, wanted to discuss some medication changes with him.  See if he will reschedule for follow-up

## 2025-06-02 ENCOUNTER — OFFICE VISIT (OUTPATIENT)
Age: 70
End: 2025-06-02

## 2025-06-02 VITALS
WEIGHT: 195.8 LBS | BODY MASS INDEX: 23.12 KG/M2 | DIASTOLIC BLOOD PRESSURE: 82 MMHG | HEIGHT: 77 IN | HEART RATE: 62 BPM | SYSTOLIC BLOOD PRESSURE: 130 MMHG

## 2025-06-02 DIAGNOSIS — G95.9 CERVICAL MYELOPATHY (HCC): Primary | ICD-10-CM

## 2025-06-02 DIAGNOSIS — M48.062 SPINAL STENOSIS OF LUMBAR REGION WITH NEUROGENIC CLAUDICATION: ICD-10-CM

## 2025-06-02 DIAGNOSIS — M43.06 SPONDYLOLYSIS OF LUMBAR REGION: ICD-10-CM

## 2025-06-02 RX ORDER — AMLODIPINE BESYLATE 10 MG/1
10 TABLET ORAL DAILY
COMMUNITY
Start: 2025-05-07

## 2025-06-02 ASSESSMENT — ENCOUNTER SYMPTOMS
SHORTNESS OF BREATH: 0
ABDOMINAL PAIN: 0

## 2025-06-02 NOTE — PROGRESS NOTES
[facility] CARDIOLOGY  [address] [place] [address], [address]  [place] [address]  PHONE: [phone]      25    NAME:  Uche Gagnon  : 1955  MRN: [id number]         SUBJECTIVE:   Uche Gagnon is a 69 y.o. male seen for a follow up visit regarding the following:     Chief Complaint   Patient presents with    Precordial pain    echo follow up            HPI:  Follow up  Precordial pain and echo follow up   .    Mr Gagnon presents today for follow-up.  We reviewed his echo which showed mildly reduced ejection fraction 45 to 50%.  No hemodynamically significant valve disease.  Overall he feels well, he is attempting to do more activity and is interested in starting some physical therapy.  No chest pain            Cardiac Medications       ACE Inhibitors       lisinopril (PRINIVIL;ZESTRIL) 20 MG tablet TAKE 1 TABLET BY MOUTH EVERY DAY       Calcium Channel Blockers       amLODIPine (NORVASC) 10 MG tablet Take 1 tablet by mouth daily     amLODIPine (NORVASC) 5 MG tablet Take 2 tablets by mouth daily     Patient not taking: Reported on 2025       Anaphylaxis Therapy Agents       EPINEPHrine (EPIPEN 2-CAMPOS) 0.3 MG/0.3ML SOAJ injection Inject 0.3 mLs into the muscle once as needed (allergic reaction, anaphylactic reaction) Use as directed for allergic reaction                  Past Medical History, Past Surgical History, Family history, Social History, and Medications were all reviewed with the patient today and updated as necessary.     Prior to Admission medications    Medication Sig Start Date End Date Taking? Authorizing Provider   amLODIPine (NORVASC) 10 MG tablet Take 1 tablet by mouth daily 25  Yes Provider, MD Maki   albuterol sulfate HFA (PROVENTIL;VENTOLIN;PROAIR) 108 (90 Base) MCG/ACT inhaler Inhale 2 puffs into the lungs daily as needed for Shortness of Breath 25  Yes Provider, Historical, MD   lisinopril (PRINIVIL;ZESTRIL) 20 MG tablet TAKE 1 TABLET BY

## 2025-06-17 ENCOUNTER — TELEPHONE (OUTPATIENT)
Age: 70
End: 2025-06-17

## 2025-08-04 ENCOUNTER — HOSPITAL ENCOUNTER (OUTPATIENT)
Dept: PHYSICAL THERAPY | Age: 70
Setting detail: RECURRING SERIES
Discharge: HOME | End: 2025-08-07
Payer: MEDICARE

## 2025-08-04 DIAGNOSIS — M48.02 SPINAL STENOSIS, CERVICAL REGION: ICD-10-CM

## 2025-08-04 DIAGNOSIS — R26.89 BALANCE DISORDER: ICD-10-CM

## 2025-08-04 DIAGNOSIS — R26.2 DIFFICULTY IN WALKING, NOT ELSEWHERE CLASSIFIED: ICD-10-CM

## 2025-08-04 DIAGNOSIS — M48.062 SPINAL STENOSIS, LUMBAR REGION WITH NEUROGENIC CLAUDICATION: Primary | ICD-10-CM

## 2025-08-04 PROCEDURE — 97162 PT EVAL MOD COMPLEX 30 MIN: CPT

## 2025-08-04 PROCEDURE — 97110 THERAPEUTIC EXERCISES: CPT

## 2025-08-04 ASSESSMENT — PAIN SCALES - GENERAL: PAINLEVEL_OUTOF10: 6

## (undated) DEVICE — PRESSURE MONITORING SET: Brand: TRUWAVE, VAMP PLUS

## (undated) DEVICE — POSTERIOR LAMI VANPLT-LUCAS: Brand: MEDLINE INDUSTRIES, INC.

## (undated) DEVICE — BIPOLAR SEALER 23-112-1 AQM 6.0: Brand: AQUAMANTYS ®

## (undated) DEVICE — DRILL BIT: Brand: XIA 4.5 SYSTEM -  XIA CT

## (undated) DEVICE — DRAIN SURG 10FR L1/8IN RND CHN FULL FLUT HEAT POLISHED PERF

## (undated) DEVICE — MODULAR TAP: Brand: XIA 4.5 SYSTEM -  XIA CT

## (undated) DEVICE — NEUROMONITORING KIT

## (undated) DEVICE — TOBRA BONE BASKET- AUTOGRAFT BONE COLLECTION SYSTEM WITH MESH FILTER AND GRAFT COMPRESSOR: Brand: TOBRA BONE BASKET

## (undated) DEVICE — STERILE PACKAGE WITH CANNULA: Brand: LITE BIO DELIVERY SYSTEM

## (undated) DEVICE — CATHETER URETH BLLN 5CC 12FR SIL ALLY AND HYDRGEL F INF

## (undated) DEVICE — SUTURE VCRL SZ 1 L27IN ABSRB UD L36MM CP-1 1/2 CIR REV CUT J268H

## (undated) DEVICE — 3M™ TEGADERM™ TRANSPARENT FILM DRESSING FRAME STYLE, 1628, 6 IN X 8 IN (15 CM X 20 CM), 10/CT 8CT/CASE: Brand: 3M™ TEGADERM™

## (undated) DEVICE — SYSTEM SKIN CLSR 60CM 2-OCTYL CYNOACRLT W/ MESH DISPNS

## (undated) DEVICE — GAUZE,SPONGE,8"X4",12PLY,XRAY,STRL,LF: Brand: MEDLINE

## (undated) DEVICE — KIT ARMOR C DRP COLLAPSIBLE AND SELF EXP TOP CVR FOR FLUOROSCOPIC

## (undated) DEVICE — TRAY,URINE METER,100% SILICONE,16FR10ML: Brand: MEDLINE

## (undated) DEVICE — CATHETER URETH PED 10FR BLLN 3CC SIL HYDRGEL 2 W F LUB

## (undated) DEVICE — SUTURE VCRL + SZ 3-0 L18IN ABSRB UD PS-2 3/8 CIR REV CUT VCP497H

## (undated) DEVICE — SUTURE VCRL SZ 2-0 L27IN ABSRB UD L36MM CP-1 1/2 CIR REV J266H

## (undated) DEVICE — 3M™ TEGADERM™ TRANSPARENT FILM DRESSING FRAME STYLE, 1626W, 4 IN X 4-3/4 IN (10 CM X 12 CM), 50/CT 4CT/CASE: Brand: 3M™ TEGADERM™

## (undated) DEVICE — AGENT HEMOSTATIC SURGIFLOW MATRIX KIT W/THROMBIN

## (undated) DEVICE — SOLUTION IRRIG 1000ML 09% SOD CHL USP PIC PLAS CONTAINER

## (undated) DEVICE — RESERVOIR,SUCTION,100CC,SILICONE: Brand: MEDLINE